# Patient Record
Sex: FEMALE | Race: OTHER | URBAN - METROPOLITAN AREA
[De-identification: names, ages, dates, MRNs, and addresses within clinical notes are randomized per-mention and may not be internally consistent; named-entity substitution may affect disease eponyms.]

---

## 2022-06-22 ENCOUNTER — INPATIENT (INPATIENT)
Facility: HOSPITAL | Age: 82
LOS: 6 days | Discharge: NURSING, REHAB OR LONG-TERM CARE | End: 2022-06-29
Attending: STUDENT IN AN ORGANIZED HEALTH CARE EDUCATION/TRAINING PROGRAM | Admitting: STUDENT IN AN ORGANIZED HEALTH CARE EDUCATION/TRAINING PROGRAM
Payer: MEDICARE

## 2022-06-22 ENCOUNTER — TRANSCRIPTION ENCOUNTER (OUTPATIENT)
Age: 82
End: 2022-06-22

## 2022-06-22 VITALS
DIASTOLIC BLOOD PRESSURE: 76 MMHG | WEIGHT: 158.95 LBS | SYSTOLIC BLOOD PRESSURE: 147 MMHG | OXYGEN SATURATION: 98 % | RESPIRATION RATE: 18 BRPM | HEART RATE: 122 BPM

## 2022-06-22 LAB
ALBUMIN SERPL ELPH-MCNC: 4.4 G/DL — SIGNIFICANT CHANGE UP (ref 3.5–5.2)
ALP SERPL-CCNC: 57 U/L — SIGNIFICANT CHANGE UP (ref 30–115)
ALT FLD-CCNC: 29 U/L — SIGNIFICANT CHANGE UP (ref 0–41)
ANION GAP SERPL CALC-SCNC: 12 MMOL/L — SIGNIFICANT CHANGE UP (ref 7–14)
APTT BLD: 28 SEC — SIGNIFICANT CHANGE UP (ref 27–39.2)
AST SERPL-CCNC: 28 U/L — SIGNIFICANT CHANGE UP (ref 0–41)
BASOPHILS # BLD AUTO: 0.03 K/UL — SIGNIFICANT CHANGE UP (ref 0–0.2)
BASOPHILS NFR BLD AUTO: 0.3 % — SIGNIFICANT CHANGE UP (ref 0–1)
BILIRUB SERPL-MCNC: 1.5 MG/DL — HIGH (ref 0.2–1.2)
BLD GP AB SCN SERPL QL: SIGNIFICANT CHANGE UP
BUN SERPL-MCNC: 12 MG/DL — SIGNIFICANT CHANGE UP (ref 10–20)
CALCIUM SERPL-MCNC: 9 MG/DL — SIGNIFICANT CHANGE UP (ref 8.5–10.1)
CHLORIDE SERPL-SCNC: 98 MMOL/L — SIGNIFICANT CHANGE UP (ref 98–110)
CO2 SERPL-SCNC: 25 MMOL/L — SIGNIFICANT CHANGE UP (ref 17–32)
CREAT SERPL-MCNC: 0.5 MG/DL — LOW (ref 0.7–1.5)
EGFR: 95 ML/MIN/1.73M2 — SIGNIFICANT CHANGE UP
EOSINOPHIL # BLD AUTO: 0.08 K/UL — SIGNIFICANT CHANGE UP (ref 0–0.7)
EOSINOPHIL NFR BLD AUTO: 0.7 % — SIGNIFICANT CHANGE UP (ref 0–8)
GLUCOSE SERPL-MCNC: 130 MG/DL — HIGH (ref 70–99)
HCT VFR BLD CALC: 36.9 % — LOW (ref 37–47)
HGB BLD-MCNC: 12.7 G/DL — SIGNIFICANT CHANGE UP (ref 12–16)
IMM GRANULOCYTES NFR BLD AUTO: 0.2 % — SIGNIFICANT CHANGE UP (ref 0.1–0.3)
INR BLD: 1.3 RATIO — SIGNIFICANT CHANGE UP (ref 0.65–1.3)
LYMPHOCYTES # BLD AUTO: 1.46 K/UL — SIGNIFICANT CHANGE UP (ref 1.2–3.4)
LYMPHOCYTES # BLD AUTO: 12.9 % — LOW (ref 20.5–51.1)
MCHC RBC-ENTMCNC: 30 PG — SIGNIFICANT CHANGE UP (ref 27–31)
MCHC RBC-ENTMCNC: 34.4 G/DL — SIGNIFICANT CHANGE UP (ref 32–37)
MCV RBC AUTO: 87 FL — SIGNIFICANT CHANGE UP (ref 81–99)
MONOCYTES # BLD AUTO: 1.27 K/UL — HIGH (ref 0.1–0.6)
MONOCYTES NFR BLD AUTO: 11.2 % — HIGH (ref 1.7–9.3)
NEUTROPHILS # BLD AUTO: 8.45 K/UL — HIGH (ref 1.4–6.5)
NEUTROPHILS NFR BLD AUTO: 74.7 % — SIGNIFICANT CHANGE UP (ref 42.2–75.2)
NRBC # BLD: 0 /100 WBCS — SIGNIFICANT CHANGE UP (ref 0–0)
PLATELET # BLD AUTO: 263 K/UL — SIGNIFICANT CHANGE UP (ref 130–400)
POTASSIUM SERPL-MCNC: 3.5 MMOL/L — SIGNIFICANT CHANGE UP (ref 3.5–5)
POTASSIUM SERPL-SCNC: 3.5 MMOL/L — SIGNIFICANT CHANGE UP (ref 3.5–5)
PROT SERPL-MCNC: 6.8 G/DL — SIGNIFICANT CHANGE UP (ref 6–8)
PROTHROM AB SERPL-ACNC: 14.9 SEC — HIGH (ref 9.95–12.87)
RBC # BLD: 4.24 M/UL — SIGNIFICANT CHANGE UP (ref 4.2–5.4)
RBC # FLD: 12.5 % — SIGNIFICANT CHANGE UP (ref 11.5–14.5)
SARS-COV-2 RNA SPEC QL NAA+PROBE: SIGNIFICANT CHANGE UP
SODIUM SERPL-SCNC: 135 MMOL/L — SIGNIFICANT CHANGE UP (ref 135–146)
TROPONIN T SERPL-MCNC: <0.01 NG/ML — SIGNIFICANT CHANGE UP
WBC # BLD: 11.31 K/UL — HIGH (ref 4.8–10.8)
WBC # FLD AUTO: 11.31 K/UL — HIGH (ref 4.8–10.8)

## 2022-06-22 PROCEDURE — 99223 1ST HOSP IP/OBS HIGH 75: CPT

## 2022-06-22 PROCEDURE — 99291 CRITICAL CARE FIRST HOUR: CPT

## 2022-06-22 PROCEDURE — 0042T: CPT

## 2022-06-22 PROCEDURE — 70496 CT ANGIOGRAPHY HEAD: CPT | Mod: 26,MA

## 2022-06-22 PROCEDURE — 99292 CRITICAL CARE ADDL 30 MIN: CPT

## 2022-06-22 PROCEDURE — 93010 ELECTROCARDIOGRAM REPORT: CPT

## 2022-06-22 PROCEDURE — 70450 CT HEAD/BRAIN W/O DYE: CPT | Mod: 26,59,76

## 2022-06-22 PROCEDURE — 70498 CT ANGIOGRAPHY NECK: CPT | Mod: 26,MA

## 2022-06-22 RX ORDER — NOREPINEPHRINE BITARTRATE/D5W 8 MG/250ML
0.05 PLASTIC BAG, INJECTION (ML) INTRAVENOUS
Qty: 8 | Refills: 0 | Status: DISCONTINUED | OUTPATIENT
Start: 2022-06-22 | End: 2022-06-24

## 2022-06-22 RX ORDER — SODIUM CHLORIDE 9 MG/ML
1000 INJECTION INTRAMUSCULAR; INTRAVENOUS; SUBCUTANEOUS ONCE
Refills: 0 | Status: DISCONTINUED | OUTPATIENT
Start: 2022-06-22 | End: 2022-06-24

## 2022-06-22 RX ORDER — SODIUM CHLORIDE 9 MG/ML
500 INJECTION INTRAMUSCULAR; INTRAVENOUS; SUBCUTANEOUS ONCE
Refills: 0 | Status: DISCONTINUED | OUTPATIENT
Start: 2022-06-22 | End: 2022-06-24

## 2022-06-22 RX ORDER — SODIUM CHLORIDE 9 MG/ML
1000 INJECTION INTRAMUSCULAR; INTRAVENOUS; SUBCUTANEOUS ONCE
Refills: 0 | Status: COMPLETED | OUTPATIENT
Start: 2022-06-22 | End: 2022-06-22

## 2022-06-22 RX ORDER — ONDANSETRON 8 MG/1
4 TABLET, FILM COATED ORAL ONCE
Refills: 0 | Status: DISCONTINUED | OUTPATIENT
Start: 2022-06-22 | End: 2022-06-29

## 2022-06-22 RX ORDER — ATORVASTATIN CALCIUM 80 MG/1
10 TABLET, FILM COATED ORAL AT BEDTIME
Refills: 0 | Status: DISCONTINUED | OUTPATIENT
Start: 2022-06-22 | End: 2022-06-22

## 2022-06-22 RX ORDER — SODIUM CHLORIDE 9 MG/ML
1000 INJECTION INTRAMUSCULAR; INTRAVENOUS; SUBCUTANEOUS
Refills: 0 | Status: DISCONTINUED | OUTPATIENT
Start: 2022-06-22 | End: 2022-06-25

## 2022-06-22 RX ORDER — ALTEPLASE 100 MG
58.4 KIT INTRAVENOUS ONCE
Refills: 0 | Status: DISCONTINUED | OUTPATIENT
Start: 2022-06-22 | End: 2022-06-23

## 2022-06-22 RX ORDER — ALTEPLASE 100 MG
6.5 KIT INTRAVENOUS ONCE
Refills: 0 | Status: DISCONTINUED | OUTPATIENT
Start: 2022-06-22 | End: 2022-06-23

## 2022-06-22 RX ORDER — CHLORHEXIDINE GLUCONATE 213 G/1000ML
1 SOLUTION TOPICAL
Refills: 0 | Status: DISCONTINUED | OUTPATIENT
Start: 2022-06-22 | End: 2022-06-29

## 2022-06-22 RX ORDER — SENNA PLUS 8.6 MG/1
2 TABLET ORAL AT BEDTIME
Refills: 0 | Status: DISCONTINUED | OUTPATIENT
Start: 2022-06-22 | End: 2022-06-29

## 2022-06-22 RX ORDER — ATORVASTATIN CALCIUM 80 MG/1
80 TABLET, FILM COATED ORAL AT BEDTIME
Refills: 0 | Status: DISCONTINUED | OUTPATIENT
Start: 2022-06-22 | End: 2022-06-23

## 2022-06-22 RX ORDER — POLYETHYLENE GLYCOL 3350 17 G/17G
17 POWDER, FOR SOLUTION ORAL EVERY 12 HOURS
Refills: 0 | Status: DISCONTINUED | OUTPATIENT
Start: 2022-06-22 | End: 2022-06-29

## 2022-06-22 RX ADMIN — SODIUM CHLORIDE 1000 MILLILITER(S): 9 INJECTION INTRAMUSCULAR; INTRAVENOUS; SUBCUTANEOUS at 22:50

## 2022-06-22 RX ADMIN — ALTEPLASE 390 MILLIGRAM(S): KIT at 16:30

## 2022-06-22 RX ADMIN — ALTEPLASE 58.4 MILLIGRAM(S): KIT at 16:31

## 2022-06-22 NOTE — ED PROVIDER NOTE - PHYSICAL EXAMINATION
Physical Exam    Vital Signs: I have reviewed the initial vital signs.  Constitutional: well-nourished, appears stated age, no acute distress  Eyes: Conjunctiva pink, Sclera clear, PERRLA, EOMI without pain.  Cardiovascular: S1 and S2, regular rate, regular rhythm, well-perfused extremities, radial pulses equal and 2+ b/l.   Respiratory: unlabored respiratory effort, clear to auscultation bilaterally no wheezing, rales and rhonchi. pt is speaking full sentences. no accessory muscle use.   Gastrointestinal: soft, non-tender, nondistended abdomen, no pulsatile mass, normal bowl sounds, no rebound, no guarding  Musculoskeletal: supple neck, no lower extremity edema, no calf tenderness  Integumentary: warm, dry, no rash  Neurologic: (+) right upper lip droop. awake, alert, cranial nerves II-XII grossly intact, extremities’ motor and sensory functions grossly intact. finger to nose intact. (+) right upper extremity pronator drift, and inability to hold right lower extremity up.   Psychiatric: appropriate mood, appropriate affect

## 2022-06-22 NOTE — BRIEF OPERATIVE NOTE - OPERATION/FINDINGS
Procedure: DSA + Mechanical Thrombectomy  Amount and type of contrast: Visipaque 320  Medications given during procedure: Verapamil IA 10 mg  Implants placed: None  Complications: None    Post-procedure exam:   NIHSS: 0  Extremity - RLE with dressing in place and c/d/i, DP and PT pulses palpable 2+.   Abd - NTND    Suggestions: -140   Disposition: NCC  Please follow post NI orders for neuro checks, distal pulses, vitals, and groin checks placed for total of 24 hours recovery period following procedure. Please keep reverse Trendelenburg x 24 hours and leave knee immobilizer x 6 hours. Keep IVF maintenance @75 until dysphagia screen / PO diet resumes.   SBP: 110-140    Please notify provider with any signs of bleeding or hematoma at R groin site, change in mental status, vitals outside parameters, or absent distal pulses.   Management per NCC.  [id number] Procedure: DSA + Mechanical Thrombectomy  Amount and type of contrast: Visipaque 320  Medications given during procedure: Verapamil IA 10 mg  Implants placed: None  Complications: None    Post-procedure exam:   Neuro - Left pupil 2mm, sluggish; right 1mm brisk (s/p eye surgery, unknown baseline), moving all extremities, + RUE drift, not to bed; RLE untested, but wiggles toes; purposeful movements (grabs arm, grasps hand, but does not follow commands), + aphasia, + severe dysarthria.  Abd: Nontender, nondistended, + bowel sounds in 4 quadrants.  Extremity - RLE with dressing in place and c/d/i, DP and PT pulses palpable 2+.     Suggestions: -140   Disposition: NCC  Please follow post NI orders for neuro checks, distal pulses, vitals, and groin checks placed for total of 24 hours recovery period following procedure. Please keep reverse Trendelenburg x 24 hours and leave knee immobilizer x 6 hours. Keep IVF maintenance @75 until dysphagia screen / PO diet resumes.   SBP: 110-140    Please notify provider with any signs of bleeding or hematoma at R groin site, change in mental status, vitals outside parameters, or absent distal pulses.   Management per NCC.  [id number]

## 2022-06-22 NOTE — ED PROVIDER NOTE - CRITICAL CARE ATTENDING CONTRIBUTION TO CARE
Attending Statement: This was a shared visit with the HUNTER. I reviewed and verified the documentation and independently performed the documented:     History, Exam and Medical Decision Making.    81yoF with h/o HLD, on no antiplt/coag agents, presents with CVA r/o. Per daughter at bedside, her brother saw and spoke with the pt at 130pm today and was nml, and on daughter arriving home shortly PTA she found her with slurred speech and R sided weakness. Has been reporting some stomach upset recently and decreased appetite. Pt reports some nausea and denies all other symptoms including HA, CP, SOB, vision changes. On exam, afebrile, hemodynamically stable, saturating well, NAD, well appearing, sitting comfortably in bed, no WOB, speaking full sentences, head NCAT, EOMI grossly, anicteric, MMM, no JVD, RRR, nml S1/S2, no m/r/g, lungs CTAB, no w/r/r, abd soft, NT, ND, nml BS, no rebound or guarding, AAO, CN's 3-12 intact other than mild R facial droop, R arm and leg drift, CALI spontaneously, no leg cyanosis or edema, skin warm, dry, no rashes or hives. Abdomen entirely benign with low suspicion for acute process. Character and appearance low suspicion for AAA, dissection, or mesenteric ischemia. UA ___________. Code stroke called on arrival and comanaged with neuro team. NIH 4 and consented pt and family for TPA. Attending Statement: This was a shared visit with the HUNTER. I reviewed and verified the documentation and independently performed the documented:     History, Exam and Medical Decision Making.    81yoF with h/o HLD, on no antiplt/coag agents, presents with CVA r/o. Per daughter at bedside, her brother saw and spoke with the pt at 130pm today and was nml, and on daughter arriving home shortly PTA she found her with slurred speech and R sided weakness. Has been reporting some stomach upset recently and decreased appetite. Pt reports some nausea and denies all other symptoms including HA, CP, SOB, vision changes. On exam, afebrile, hemodynamically stable, saturating well, NAD, well appearing, sitting comfortably in bed, no WOB, speaking full sentences, head NCAT, EOMI grossly, anicteric, MMM, no JVD, RRR, nml S1/S2, no m/r/g, lungs CTAB, no w/r/r, abd soft, NT, ND, nml BS, no rebound or guarding, AAO, CN's 3-12 intact other than mild R facial droop, R arm and leg drift, CALI spontaneously, no leg cyanosis or edema, skin warm, dry, no rashes or hives. Abdomen entirely benign with low suspicion for acute process. Character and appearance low suspicion for AAA, dissection, or mesenteric ischemia. Code stroke called on arrival and comanaged with neuro team. NIH 4 and consented pt and family for TPA. Not candidate for NI due to low NIH. Following TPA pt became less responsive, more R arm flaccidity. Protecting airway. Rpt CT head no bleed. Sent for neurointervention.

## 2022-06-22 NOTE — H&P ADULT - CRITICAL CARE ATTENDING COMMENT
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 Extubated successfully post operatively, mild SAH vs contrast left sylvian fissure.  Rest of assessment and systemic plan of care as outlined above.

## 2022-06-22 NOTE — ED PROVIDER NOTE - OBJECTIVE STATEMENT
80 y/o female with a PMH of HTN, current cigarette smoking, and glaucoma presents to the ED for evaluation of right upper extremity weakness and right sided facial droop that began around 3pm. as per pt daughter she last saw pt normal around 10:30AM, and pt son in law saw her normal around 12:30PM, but then around 3pm pt daughter noted the symptoms. pt denies use of blood thinners, recent head trauma, chest pain, sob, fever, neck pain, back pain, abdominal pain, n/v/d/c, urinary or bowel retention or incontinence, numbness, or tingling.

## 2022-06-22 NOTE — STROKE CODE NOTE - NSMDCONSULT QTN_Y FT
80 yo female with pmhx of glaucoma, hld presents with right sided weakness and slurred speech. Pt states that she isnt feeling right. Daughter saw mother normal at 10 am. When she saw her again, she had right sided weakness and slurred speech. Daughter contacted cleaning lady who stated that the last time she saw her normal was at 130 pm. Stroke code called in ED. CTH showed possible Hyperdensity in the left carotid terminus could reflect underlying thrombosis, atherosclerotic disease, or artifact from adjacent bony structures. CTA/CTP Delayed perfusion in the left frontotemporal regions along the MCA territory measuring 81 mL. No perfusion evidence of core infarct. 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 The gradual loss of vascular enhancement in the proximal left cervical ICA could reflect pseudoocclusion. Confirmation with DSA is recommended.    NIH 4 on arrival to ED.   Post CTH patient was improving to NIH 2. Pt was given TPA at 1630 bolus 6.5mg. Evaluated by Dr Campos and Neurocrit team. Determined not to be a candidate for intervention due to NIH 0 on exam at this time. Around 17:00/ 17:10 pt stopped speaking, noticed to have left dilated pupil, not moving right side and not speaking but still following commands on left side. Sensation intact in upper part of arm but not lower arm or lower extremity. /66. Repeat CTH unchanged. Tpa completed. 500cc fluids given.     Discussed with Dr Darden, Code NI actual called. Pt taken to IR for thrombectomy.     Suggestion:  admit to Neuro ICU  post thrombectomy care  check H&P for complete plan 82 yo female with pmhx of glaucoma, hld presents with right sided weakness and slurred speech. Pt states that she isnt feeling right. Daughter saw mother normal at 10 am. When she saw her again, she had right sided weakness and slurred speech. Daughter contacted cleaning lady who stated that the last time she saw her normal was at 130 pm. Stroke code called in ED. CTH showed possible Hyperdensity in the left carotid terminus could reflect underlying thrombosis, atherosclerotic disease, or artifact from adjacent bony structures. CTA/CTP Delayed perfusion in the left frontotemporal regions along the MCA territory measuring 81 mL. No perfusion evidence of core infarct. 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 The gradual loss of vascular enhancement in the proximal left cervical ICA could reflect pseudoocclusion. Confirmation with DSA is recommended.    NIH 4 on arrival to ED.   Post CTH patient was improving to NIH 2. Pt was given TPA at 1630 bolus 6.5mg. Evaluated by Dr Campos and Neurocrit team. Determined not to be a candidate for intervention due to NIH 0 on exam at this time. Around 17:00/ 17:10 pt stopped speaking, noticed to have left dilated pupil, not moving right side and not speaking but still following commands on left side. Sensation intact in upper part of arm but not lower arm or lower extremity. Repeat NIH 17. /66. Repeat CTH unchanged. Tpa completed. 500cc fluids given.     Discussed with Dr Darden, Code NI actual called. Pt taken to IR for thrombectomy.     Suggestion:  admit to Neuro ICU  post thrombectomy care  check H&P for complete plan

## 2022-06-22 NOTE — H&P ADULT - NSHPLABSRESULTS_GEN_ALL_CORE
ICU Vital Signs Last 24 Hrs  T(C): --  T(F): --  HR: 91 (22 Jun 2022 17:15) (91 - 122)  BP: 151/76 (22 Jun 2022 17:15) (129/66 - 160/73)  BP(mean): --  ABP: --  ABP(mean): --  RR: 18 (22 Jun 2022 17:15) (16 - 18)  SpO2: 99% (22 Jun 2022 17:15) (95% - 99%)      alteplase    Bolus 6.5 milliGRAM(s) IV Bolus Once  alteplase    IVPB 58.4 milliGRAM(s) IV Intermittent Once  ondansetron Injectable 4 milliGRAM(s) IV Push once  sodium chloride 0.9% Bolus 500 milliLiter(s) IV Bolus once  sodium chloride 0.9% Bolus 1000 milliLiter(s) IV Bolus once      LABS:  Na: 135 (06-22 @ 16:45)  K: 3.5 (06-22 @ 16:45)  Cl: 98 (06-22 @ 16:45)  CO2: 25 (06-22 @ 16:45)  BUN: 12 (06-22 @ 16:45)  Cr: 0.5 (06-22 @ 16:45)  Glu: 130(06-22 @ 16:45)    Hgb: 12.7 (06-22 @ 16:45)  Hct: 36.9 (06-22 @ 16:45)  WBC: 11.31 (06-22 @ 16:45)  Plt: 263 (06-22 @ 16:45)    INR: 1.30 06-22-22 @ 16:45  PTT: 28.0 06-22-22 @ 16:45    LIVER FUNCTIONS - ( 22 Jun 2022 16:45 )  Alb: 4.4 g/dL / Pro: 6.8 g/dL / ALK PHOS: 57 U/L / ALT: 29 U/L / AST: 28 U/L / GGT: x           Imaging:  EXAM:  CT ANGIO BRAIN (W)AW IC/CT ANGIO NECK (W)AW IC/CT PERFUSION W MAPS IC [6/22/2022]  IMPRESSION:  CT PERFUSION:  Delayed perfusion in the left frontotemporal regions along the MCA territory measuring 81 mL. No perfusion evidence of core infarct.  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 The gradual loss of vascular enhancement in the proximal left cervical ICA could reflect pseudoocclusion. Confirmation with DSA is recommended.  EXAM:  CT BRAIN STROKE PROTOCOL [6/22/2022]  FINDINGS:  There is hyperdensity in the left carotid terminus (2-9).  There is prominence of the sulci, sylvian fissures, and ventricles, reflecting mild diffuse parenchymal volume loss.  There is no evidence of acute territorial infarct or intracranial hemorrhage. There is no space-occupying lesion or midline shift.  There is no evidence of hydrocephalus. There are no extra-axial fluid collections.  The visualized intraorbital contents are normal. Small retention cysts versus mucosal polyp in the right sphenoid sinus. The mastoid air cells are aerated. The visualized soft tissues and osseous structures appear normal.  IMPRESSION:  Hyperdensity in the left carotid terminus could reflect underlying thrombosis, atherosclerotic disease, or artifact from adjacent bony structures.  No acute territorial infarct, intracranial hemorrhage, or midline shift.

## 2022-06-22 NOTE — CHART NOTE - NSCHARTNOTEFT_GEN_A_CORE
Patient was code NI actual with an NIHSS on exam of 21 for mute, sensory loss, RUE no movement, RLE no movement, partial facial paralysis, gaze paly, LOC, and orientation post tPA. Patient had acutely worsening symptoms, CT Head was taken on which no bleed was identified, and the patient was taken for an emergent DSA +/- MT. [id number]

## 2022-06-22 NOTE — ED ADULT NURSE REASSESSMENT NOTE - NS ED NURSE REASSESS COMMENT FT1
pt was receiving tpa and all of a sudden became altered and not responding. . pt was unable to move right side of her body. md and neuro notified. tpa temporarily stopped and began again after repeat ct. tpa finished. pt moved to ir.

## 2022-06-22 NOTE — H&P ADULT - NSHPPHYSICALEXAM_GEN_ALL_CORE
EXAMINATION:  General: No acute distress  HEENT: Anicteric sclerae  Cardiac: [id number]  Lungs: Clear  Abdomen: Soft, non-tender, +BS  Extremities: No c/c/e  Skin/Incision Site: Clean, dry and intact  Neurologic: Awake, alert, fully oriented, follows commands, no dysarthria or aphasia, PERRL, VFFtc, EOMI, face symmetric, tongue midline, slight LUE drift, does not hit bed, RUE and B/L LE, no drift, full strength, sensation intact, no neglect    NIHSS = 1 EXAMINATION:  General: No acute distress  HEENT: Anicteric sclerae  Cardiac: [id number]  Lungs: Clear  Abdomen: Soft, non-tender, +BS  Extremities: No c/c/e  Skin/Incision Site: Clean, dry and intact  Neurologic: Awake, alert, fully oriented, follows commands, no dysarthria or aphasia, PERRL, VFFtc, EOMI, face symmetric, tongue midline, slight RUE drift, does not hit bed, LUE and B/L LE, no drift, full strength, sensation intact, no neglect    NIHSS = 1

## 2022-06-22 NOTE — ED PROVIDER NOTE - NS ED ROS FT
CONST: No fever, chills or bodyaches  EYES: No pain, redness, drainage or visual changes.  ENT: No ear pain or discharge, nasal discharge or congestion. No sore throat  CARD: No chest pain, palpitations  RESP: No SOB, cough, hemoptysis. No hx of asthma or COPD  GI: No abdominal pain, N/V/D  : No urinary symptoms  MS: No joint pain, back pain or extremity pain/injury  SKIN: No rashes  NEURO: (+) right facial droop, and right upper extremity weakness. No headache, dizziness, paresthesias or LOC

## 2022-06-22 NOTE — H&P ADULT - HISTORY OF PRESENT ILLNESS
81-year-old female with PMHx HLD p/w dysarthria, mild right facial droop, and RUE/RLE weakness, LKW 1330, a/w upset stomach, nausea, and decreased PO intake, NIHSS = 4. Stroke code activated. CTH revealed hyperdensity in the left carotid terminus could reflect underlying thrombosis, atherosclerotic disease, or artifact from adjacent bony structures, with no acute territorial infarct, intracranial hemorrhage, or midline shift. 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 The gradual loss of vascular enhancement in the proximal left cervical ICA could reflect pseudoocclusion. CTP revealed delayed perfusion in the left frontotemporal regions along the MCA territory measuring 81 mL. No perfusion evidence of core infarct. Risks versus benefits of tPA discussed with daughter, tPA administered @ 1630. Code NI actual for Left ICA occlusion, cancelled 2/2 improving NIHSS. Patient examined by NCC team during tPA administration, NIHSS = 1 for notable LUE drift. Admit to NCCU for close neurological monitoring and optimization.     Interval events: At 1700, patient aphasic, dilated left pupil, dense right-sided hemiplegia, and decreased sensation right lower arm and RLE, however still able to follow commands on left side. Code NI reactivated. Patient taken for mechanical thrombectomy.    81-year-old female with PMHx HLD p/w dysarthria, mild right facial droop, and RUE/RLE weakness, LKW 1330, a/w upset stomach, nausea, and decreased PO intake, NIHSS = 4. Stroke code activated. CTH revealed hyperdensity in the left carotid terminus could reflect underlying thrombosis, atherosclerotic disease, or artifact from adjacent bony structures, with no acute territorial infarct, intracranial hemorrhage, or midline shift. 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 The gradual loss of vascular enhancement in the proximal left cervical ICA could reflect pseudoocclusion. CTP revealed delayed perfusion in the left frontotemporal regions along the MCA territory measuring 81 mL. No perfusion evidence of core infarct. Risks versus benefits of tPA discussed with daughter, tPA administered @ 1630. Code NI actual for Left ICA occlusion, cancelled 2/2 improving NIHSS. Patient examined by NCC team during tPA administration, NIHSS = 1 for notable LUE drift. Admit to NCCU for close neurological monitoring and optimization.     Interval events: At 1700, patient aphasic, dilated left pupil, dense right-sided hemiplegia, and decreased sensation right lower arm and RLE, however still able to follow commands on left side. Repeat CTH revealed no acute territorial infarct, intracranial hemorrhage, or midline shift, with retained contrast in the intracranial vessels. Code NI reactivated. Patient taken for mechanical thrombectomy.

## 2022-06-22 NOTE — ED ADULT TRIAGE NOTE - CHIEF COMPLAINT QUOTE
Pt here slurred speech and R sided weakness starting ~ 3pm noticed by pt and daughter. Stroke code activated prenotification. BGL

## 2022-06-22 NOTE — CHART NOTE - NSCHARTNOTEFT_GEN_A_CORE
ICU ADMISSION NOTE      Procedure:   Post op diagnosis:      _x___  Intubated  TV:__450____       Rate: _12_____      FiO2: _60_____    ____  Patent Airway    ____  Full return of protective reflexes    ____  Full recovery from anesthesia / back to baseline status    Vitals  HR: 81  BP: 118/65  RR: 12  O2 Sat: 100  Temp: 36    Mental Status:  ____ Awake   _____ Alert   _____ Drowsy   __x___ Sedated    Nausea/Vomiting:  __x__ NO  ______Yes,   See Post - Op Orders          Pain Scale (0-10):  _____    Treatment: ____ None    __x__ See Post - Op/PCA Orders    Post - Operative Fluids:   ____ Oral   __x__ See Post - Op Orders    Plan: Discharge when criteria met:   ____Home       _____Floor     __x___Critical Care   Other:_________________    Comments: Patient had smooth intraoperative event, no anesthesia complication.

## 2022-06-22 NOTE — ED ADULT TRIAGE NOTE - BEFAST BALANCE
No Detail Level: Detailed Quality 402: Tobacco Use And Help With Quitting Among Adolescents: Patient screened for tobacco and never smoked Quality 226: Preventive Care And Screening: Tobacco Use: Screening And Cessation Intervention: Patient screened for tobacco use and is an ex/non-smoker

## 2022-06-22 NOTE — ED PROVIDER NOTE - PROGRESS NOTE DETAILS
FF: pt is a candidate for tpa, as per neurology recommend ordering it and mixing it. FF: pt given tpa, pt now is not speaking, not moving the right upper or lower extremity. right sided hemiplegia. pt left pupil is 4mm and not responsive to light. neurology notified. tpa stopped. repeat fingerstick performed. ct performed. no bleeding. can resume tpa and give 500cc of normal saline. NI alert again. FF: pt given tpa, pt now is not speaking, not moving the right upper or lower extremity. right sided hemiplegia. pt left pupil is 4mm and not responsive to light. neurology notified. tpa stopped. repeat fingerstick performed. ct performed. no bleeding. can resume tpa and give 500cc of normal saline. NI alert again, pt going to interventional radiology. FF: pt given tpa, pt now is not speaking, not moving the right upper or lower extremity. right sided hemiplegia. pt left pupil is 4mm and not responsive to light. neurology notified. tpa stopped. repeat fingerstick performed. ct performed. no bleeding. can resume tpa and give 500cc of normal saline. NI alert again, pt going to interventional neuro.

## 2022-06-22 NOTE — H&P ADULT - ASSESSMENT
`    Risks (including death, and/or increased risk of systemic or intracranial hemorrhage) and benefits (increased odds of good neurologic outcome at 3 months) have been discussed with patient or patient’s representative.  Alternatives include conservative management without tPA were also discussed.    - Administer tPA 0.9 mg/kg, with 10% as an IV bolus over one minute and remainder as an IV infusion over one hour  - Monitor neurological exam closely.  If there is any neurologic deterioration, notify Neurology consult STAT, discontinue tPA infusion, obtain STAT CT head w/o contrast, and STAT set of labs including coags, fibrinogen, type and crossmatch  - Check BP q15min x2 hrs, q30min x4 hrs, and then q1hr x48 hrs   - Monitor for any signs/symptoms of systemic bleeding  - No anticoagulation or antiplatelet agents x24 hrs  - Avoid central lines, arterial lines, NG tubes, and cannon catheters for 24 hours post-tPA, unless medically necessary  - Plan for repeat CT head 24 hrs post-tPA administration    Blood Pressure Management Post-tPA: GOAL SBP <180/105       ASSESSMENT: 81-year-old female p/w dysarthria, mild right facial droop, and RUE/RLE weakness. 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 s/p tpa @ 1630, 6/22. No NI intervention 2/2 NIHSS improving from 4 --> 1. After tpa, patient became aphasic, dilated left pupil, dense right-sided hemiplegia, and decreased sensation right lower arm and RLE, however still able to follow commands on left side. Code NI reactivated. Patient taken for mechanical thrombectomy.     PLAN:   NEURO:  - V/S, Neurochecks w/ NIHSS, groin checks, access site assessment, abdominal and pelvic tenderness, distal pulses and neurovascular assessment:         - [id number] x 2 hours         - [id number] x 6 hours         - q1hour x 16 hours  - Assess for s/s limb ischemia, hematoma, or bleeding, if present, please call NCC/Neurology  - Keep limb straight for 6 hours via immobilizer  - [place] Trendelenburg x 6 hours   - post tPA management:         - Monitor for any signs/symptoms of systemic bleeding         - No anticoagulation or antiplatelet agents x24 hrs         - Avoid central lines, arterial lines, NG tubes, and cannon catheters for 24 hours post-tPA, unless medically necessary         - Plan for repeat CT head 24 hrs post-tPA administration  - MRI brain when able   - Seizure precautions  - ICU delirium precautions  Activity: [X] Increase as tolerated [] Bedrest [X] PT [X] OT [] PMNR    PULM:  - HOB > 35 degrees  - Aspiration precautions  - Keep SaO2 > 95%    CV:  - Keep SBP < 160  - Telemetry monitoring  - 12-lead ECG  - TTE/2D echo  - Lipid profile  - h/o HLD, restart home statin    RENAL:  - Strict I/Os, daily weights  - Keep euvolemic  - Keep normonatremic   - Keep Magnesium level > 2  - Monitor lytes, replete as needed  - NS @ 75cc/hr    GI:  Diet: Dysphagia screen and then advance diet as tolerated  GI prophylaxis [X] not indicated [] PPI [] other:  Bowel regimen [X] Miralax [X] senna [] other:    ENDO:   - Goal euglycemia (-180)  - HgA1c, TSH    HEME/ONC:  VTE prophylaxis: [X] SCDs [] chemoprophylaxis [] hold chemoprophylaxis due to: [] high risk of DVT/PE on admission due to:  - Hold A/C and A/P for now  - VA Duplex B/L LE    ID:  - Keep normothermic, avoid fevers    MISC:  PT/OT/SLP    CODE STATUS:  [X] Full Code [] DNR [] DNI [] Palliative/Comfort Care    DISPOSITION:  [X] NCCU [] Stroke Unit [] Floor [] EMU [] RCU [] PCU

## 2022-06-22 NOTE — BRIEF OPERATIVE NOTE - COMMENTS
Left proximal ICA/ M1 occlusion with IAN 0-2C achieved in 2 passes. 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   Pre procedure the patient was intubated due to patient vomiting/gagging and concerns about aspiration and arrived to MT suite with NIHSS 21. Post procedure the patient was taken for a CT head which showed SAH (8:16PM) which showed acute SAH in the silvian fissure and left frontal/temporal/parietal sulci. Patient was extubated successfully. Left proximal ICA/ M1 occlusion with TICI  0-2C achieved in 2 passes. 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   Pre procedure the patient was intubated due to patient vomiting/gagging and concerns about aspiration and arrived to MT suite with NIHSS 21. Post procedure the patient was taken for a CT head which showed SAH (8:16PM) which showed acute SAH in the silvian fissure and left frontal/temporal/parietal sulci. Patient was extubated successfully.   POST EXTUBATION, SHE OPENED HER EYES ON VOICE STIMULATION, FOLLOWING OBJECTS WITH HER EYES, MUMBLING, MOVING LT ARM AND LEG SPONTANEOUSLY, MOVING RIGHT ARM ANTIGRAVITY SPONTAEOUSLY, AND FLEXING RT ANKLE (HAS KNEE IMOBILIZER ON THE LT). SHE WAS NOT FOLLOWING COMMANDS, AND DID NOT HAVE NAUSEA OR VOMITING POST EXTUBATION.

## 2022-06-22 NOTE — ED PROVIDER NOTE - CLINICAL SUMMARY MEDICAL DECISION MAKING FREE TEXT BOX
Attending Statement: This was a shared visit with the HUNTER. I reviewed and verified the documentation and independently performed the documented:     History, Exam and Medical Decision Making.    81yoF with h/o HLD, on no antiplt/coag agents, presents with CVA r/o. Per daughter at bedside, her brother saw and spoke with the pt at 130pm today and was nml, and on daughter arriving home shortly PTA she found her with slurred speech and R sided weakness. Has been reporting some stomach upset recently and decreased appetite. Pt reports some nausea and denies all other symptoms including HA, CP, SOB, vision changes. On exam, afebrile, hemodynamically stable, saturating well, NAD, well appearing, sitting comfortably in bed, no WOB, speaking full sentences, head NCAT, EOMI grossly, anicteric, MMM, no JVD, RRR, nml S1/S2, no m/r/g, lungs CTAB, no w/r/r, abd soft, NT, ND, nml BS, no rebound or guarding, AAO, CN's 3-12 intact other than mild R facial droop, R arm and leg drift, CALI spontaneously, no leg cyanosis or edema, skin warm, dry, no rashes or hives. Abdomen entirely benign with low suspicion for acute process. Character and appearance low suspicion for AAA, dissection, or mesenteric ischemia. Code stroke called on arrival and comanaged with neuro team. NIH 4 and consented pt and family for TPA. Not candidate for NI due to low NIH. Following TPA pt became less responsive, more R arm flaccidity. Protecting airway. Rpt CT head no bleed. Sent for neurointervention.

## 2022-06-23 LAB
A1C WITH ESTIMATED AVERAGE GLUCOSE RESULT: 5 % — SIGNIFICANT CHANGE UP (ref 4–5.6)
ALBUMIN SERPL ELPH-MCNC: 3.7 G/DL — SIGNIFICANT CHANGE UP (ref 3.5–5.2)
ALBUMIN SERPL ELPH-MCNC: 3.8 G/DL — SIGNIFICANT CHANGE UP (ref 3.5–5.2)
ALP SERPL-CCNC: 47 U/L — SIGNIFICANT CHANGE UP (ref 30–115)
ALP SERPL-CCNC: 49 U/L — SIGNIFICANT CHANGE UP (ref 30–115)
ALT FLD-CCNC: 21 U/L — SIGNIFICANT CHANGE UP (ref 0–41)
ALT FLD-CCNC: 25 U/L — SIGNIFICANT CHANGE UP (ref 0–41)
ANION GAP SERPL CALC-SCNC: 10 MMOL/L — SIGNIFICANT CHANGE UP (ref 7–14)
ANION GAP SERPL CALC-SCNC: 12 MMOL/L — SIGNIFICANT CHANGE UP (ref 7–14)
AST SERPL-CCNC: 20 U/L — SIGNIFICANT CHANGE UP (ref 0–41)
AST SERPL-CCNC: 29 U/L — SIGNIFICANT CHANGE UP (ref 0–41)
BASOPHILS # BLD AUTO: 0.02 K/UL — SIGNIFICANT CHANGE UP (ref 0–0.2)
BASOPHILS NFR BLD AUTO: 0.2 % — SIGNIFICANT CHANGE UP (ref 0–1)
BILIRUB SERPL-MCNC: 0.9 MG/DL — SIGNIFICANT CHANGE UP (ref 0.2–1.2)
BILIRUB SERPL-MCNC: 1 MG/DL — SIGNIFICANT CHANGE UP (ref 0.2–1.2)
BUN SERPL-MCNC: 11 MG/DL — SIGNIFICANT CHANGE UP (ref 10–20)
BUN SERPL-MCNC: 9 MG/DL — LOW (ref 10–20)
CALCIUM SERPL-MCNC: 8 MG/DL — LOW (ref 8.5–10.1)
CALCIUM SERPL-MCNC: 8.1 MG/DL — LOW (ref 8.5–10.1)
CHLORIDE SERPL-SCNC: 104 MMOL/L — SIGNIFICANT CHANGE UP (ref 98–110)
CHLORIDE SERPL-SCNC: 107 MMOL/L — SIGNIFICANT CHANGE UP (ref 98–110)
CHOLEST SERPL-MCNC: 128 MG/DL — SIGNIFICANT CHANGE UP
CO2 SERPL-SCNC: 23 MMOL/L — SIGNIFICANT CHANGE UP (ref 17–32)
CO2 SERPL-SCNC: 24 MMOL/L — SIGNIFICANT CHANGE UP (ref 17–32)
CREAT SERPL-MCNC: 0.5 MG/DL — LOW (ref 0.7–1.5)
CREAT SERPL-MCNC: <0.5 MG/DL — LOW (ref 0.7–1.5)
EGFR: 101 ML/MIN/1.73M2 — SIGNIFICANT CHANGE UP
EGFR: 95 ML/MIN/1.73M2 — SIGNIFICANT CHANGE UP
EOSINOPHIL # BLD AUTO: 0.03 K/UL — SIGNIFICANT CHANGE UP (ref 0–0.7)
EOSINOPHIL NFR BLD AUTO: 0.3 % — SIGNIFICANT CHANGE UP (ref 0–8)
ESTIMATED AVERAGE GLUCOSE: 97 MG/DL — SIGNIFICANT CHANGE UP (ref 68–114)
GLUCOSE SERPL-MCNC: 104 MG/DL — HIGH (ref 70–99)
GLUCOSE SERPL-MCNC: 131 MG/DL — HIGH (ref 70–99)
HCT VFR BLD CALC: 32.9 % — LOW (ref 37–47)
HCT VFR BLD CALC: 33.4 % — LOW (ref 37–47)
HDLC SERPL-MCNC: 41 MG/DL — LOW
HGB BLD-MCNC: 11.1 G/DL — LOW (ref 12–16)
HGB BLD-MCNC: 11.5 G/DL — LOW (ref 12–16)
IMM GRANULOCYTES NFR BLD AUTO: 0.2 % — SIGNIFICANT CHANGE UP (ref 0.1–0.3)
LIPID PNL WITH DIRECT LDL SERPL: 75 MG/DL — SIGNIFICANT CHANGE UP
LYMPHOCYTES # BLD AUTO: 0.89 K/UL — LOW (ref 1.2–3.4)
LYMPHOCYTES # BLD AUTO: 9.3 % — LOW (ref 20.5–51.1)
MAGNESIUM SERPL-MCNC: 1.9 MG/DL — SIGNIFICANT CHANGE UP (ref 1.8–2.4)
MAGNESIUM SERPL-MCNC: 2 MG/DL — SIGNIFICANT CHANGE UP (ref 1.8–2.4)
MCHC RBC-ENTMCNC: 30.2 PG — SIGNIFICANT CHANGE UP (ref 27–31)
MCHC RBC-ENTMCNC: 30.3 PG — SIGNIFICANT CHANGE UP (ref 27–31)
MCHC RBC-ENTMCNC: 33.7 G/DL — SIGNIFICANT CHANGE UP (ref 32–37)
MCHC RBC-ENTMCNC: 34.4 G/DL — SIGNIFICANT CHANGE UP (ref 32–37)
MCV RBC AUTO: 88.1 FL — SIGNIFICANT CHANGE UP (ref 81–99)
MCV RBC AUTO: 89.4 FL — SIGNIFICANT CHANGE UP (ref 81–99)
MONOCYTES # BLD AUTO: 0.97 K/UL — HIGH (ref 0.1–0.6)
MONOCYTES NFR BLD AUTO: 10.1 % — HIGH (ref 1.7–9.3)
MRSA PCR RESULT.: NEGATIVE — SIGNIFICANT CHANGE UP
NEUTROPHILS # BLD AUTO: 7.69 K/UL — HIGH (ref 1.4–6.5)
NEUTROPHILS NFR BLD AUTO: 79.9 % — HIGH (ref 42.2–75.2)
NON HDL CHOLESTEROL: 87 MG/DL — SIGNIFICANT CHANGE UP
NRBC # BLD: 0 /100 WBCS — SIGNIFICANT CHANGE UP (ref 0–0)
NRBC # BLD: 0 /100 WBCS — SIGNIFICANT CHANGE UP (ref 0–0)
PHOSPHATE SERPL-MCNC: 2.9 MG/DL — SIGNIFICANT CHANGE UP (ref 2.1–4.9)
PHOSPHATE SERPL-MCNC: 3.8 MG/DL — SIGNIFICANT CHANGE UP (ref 2.1–4.9)
PLATELET # BLD AUTO: 215 K/UL — SIGNIFICANT CHANGE UP (ref 130–400)
PLATELET # BLD AUTO: 220 K/UL — SIGNIFICANT CHANGE UP (ref 130–400)
POTASSIUM SERPL-MCNC: 3.7 MMOL/L — SIGNIFICANT CHANGE UP (ref 3.5–5)
POTASSIUM SERPL-MCNC: 4.2 MMOL/L — SIGNIFICANT CHANGE UP (ref 3.5–5)
POTASSIUM SERPL-SCNC: 3.7 MMOL/L — SIGNIFICANT CHANGE UP (ref 3.5–5)
POTASSIUM SERPL-SCNC: 4.2 MMOL/L — SIGNIFICANT CHANGE UP (ref 3.5–5)
PROT SERPL-MCNC: 5.3 G/DL — LOW (ref 6–8)
PROT SERPL-MCNC: 6.1 G/DL — SIGNIFICANT CHANGE UP (ref 6–8)
RBC # BLD: 3.68 M/UL — LOW (ref 4.2–5.4)
RBC # BLD: 3.79 M/UL — LOW (ref 4.2–5.4)
RBC # FLD: 12.5 % — SIGNIFICANT CHANGE UP (ref 11.5–14.5)
RBC # FLD: 12.5 % — SIGNIFICANT CHANGE UP (ref 11.5–14.5)
SODIUM SERPL-SCNC: 139 MMOL/L — SIGNIFICANT CHANGE UP (ref 135–146)
SODIUM SERPL-SCNC: 141 MMOL/L — SIGNIFICANT CHANGE UP (ref 135–146)
TRIGL SERPL-MCNC: 60 MG/DL — SIGNIFICANT CHANGE UP
TSH SERPL-MCNC: 0.37 UIU/ML — SIGNIFICANT CHANGE UP (ref 0.27–4.2)
WBC # BLD: 12.06 K/UL — HIGH (ref 4.8–10.8)
WBC # BLD: 9.62 K/UL — SIGNIFICANT CHANGE UP (ref 4.8–10.8)
WBC # FLD AUTO: 12.06 K/UL — HIGH (ref 4.8–10.8)
WBC # FLD AUTO: 9.62 K/UL — SIGNIFICANT CHANGE UP (ref 4.8–10.8)

## 2022-06-23 PROCEDURE — 93306 TTE W/DOPPLER COMPLETE: CPT | Mod: 26

## 2022-06-23 PROCEDURE — 93970 EXTREMITY STUDY: CPT | Mod: 26

## 2022-06-23 PROCEDURE — 70450 CT HEAD/BRAIN W/O DYE: CPT | Mod: 26

## 2022-06-23 PROCEDURE — 70450 CT HEAD/BRAIN W/O DYE: CPT | Mod: 26,77

## 2022-06-23 PROCEDURE — 99291 CRITICAL CARE FIRST HOUR: CPT

## 2022-06-23 RX ORDER — POTASSIUM CHLORIDE 20 MEQ
20 PACKET (EA) ORAL ONCE
Refills: 0 | Status: COMPLETED | OUTPATIENT
Start: 2022-06-23 | End: 2022-06-23

## 2022-06-23 RX ORDER — ATORVASTATIN CALCIUM 80 MG/1
10 TABLET, FILM COATED ORAL AT BEDTIME
Refills: 0 | Status: DISCONTINUED | OUTPATIENT
Start: 2022-06-23 | End: 2022-06-27

## 2022-06-23 RX ORDER — LEVETIRACETAM 250 MG/1
500 TABLET, FILM COATED ORAL EVERY 12 HOURS
Refills: 0 | Status: DISCONTINUED | OUTPATIENT
Start: 2022-06-23 | End: 2022-06-29

## 2022-06-23 RX ORDER — MAGNESIUM SULFATE 500 MG/ML
2 VIAL (ML) INJECTION ONCE
Refills: 0 | Status: COMPLETED | OUTPATIENT
Start: 2022-06-23 | End: 2022-06-23

## 2022-06-23 RX ADMIN — SENNA PLUS 2 TABLET(S): 8.6 TABLET ORAL at 21:52

## 2022-06-23 RX ADMIN — SODIUM CHLORIDE 75 MILLILITER(S): 9 INJECTION INTRAMUSCULAR; INTRAVENOUS; SUBCUTANEOUS at 11:53

## 2022-06-23 RX ADMIN — LEVETIRACETAM 400 MILLIGRAM(S): 250 TABLET, FILM COATED ORAL at 18:09

## 2022-06-23 RX ADMIN — ATORVASTATIN CALCIUM 10 MILLIGRAM(S): 80 TABLET, FILM COATED ORAL at 21:52

## 2022-06-23 RX ADMIN — Medication 25 GRAM(S): at 08:09

## 2022-06-23 RX ADMIN — POLYETHYLENE GLYCOL 3350 17 GRAM(S): 17 POWDER, FOR SOLUTION ORAL at 18:10

## 2022-06-23 RX ADMIN — CHLORHEXIDINE GLUCONATE 1 APPLICATION(S): 213 SOLUTION TOPICAL at 05:47

## 2022-06-23 RX ADMIN — Medication 50 MILLIEQUIVALENT(S): at 08:05

## 2022-06-23 RX ADMIN — LEVETIRACETAM 400 MILLIGRAM(S): 250 TABLET, FILM COATED ORAL at 11:51

## 2022-06-23 NOTE — OCCUPATIONAL THERAPY INITIAL EVALUATION ADULT - LIVES WITH, PROFILE
pvt home, +GINA, +stairs inside, provided by dtr 2* to pt with decreased arousal and aphasia/children/spouse

## 2022-06-23 NOTE — PROVIDER CONTACT NOTE (OTHER) - DATE AND TIME:
22-Jun-2022 22:00
23-Jun-2022 05:00
22-Jun-2022 20:45
23-Jun-2022 00:30
23-Jun-2022 02:45
22-Jun-2022 21:00
23-Jun-2022 06:00

## 2022-06-23 NOTE — OCCUPATIONAL THERAPY INITIAL EVALUATION ADULT - ADL RETRAINING, OT EVAL
Patient will perform upper body dressing with minimal assistance by discharge.  Patient will perform lower body dressing with minimal assistance with use of appropriate adaptive equipment as needed by discharge.

## 2022-06-23 NOTE — PATIENT PROFILE ADULT - FALL HARM RISK - HARM RISK INTERVENTIONS

## 2022-06-23 NOTE — OCCUPATIONAL THERAPY INITIAL EVALUATION ADULT - RANGE OF MOTION EXAMINATION, UPPER EXTREMITY
RUE noted with ~1/2 gross grasp, ~1/2 elbow, ~1/4 shoulder flexion/Left UE Active ROM was WNL (within normal limits)

## 2022-06-23 NOTE — PROGRESS NOTE ADULT - SUBJECTIVE AND OBJECTIVE BOX
SUMMARY: 81-year-old female with PMHx HLD p/w dysarthria, mild right facial droop, and RUE/RLE weakness, LKW 1330, a/w upset stomach, nausea, and decreased PO intake, NIHSS = 4. Stroke code activated. CTH revealed hyperdensity in the left carotid terminus could reflect underlying thrombosis, atherosclerotic disease, or artifact from adjacent bony structures, with no acute territorial infarct, intracranial hemorrhage, or midline shift. 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 The gradual loss of vascular enhancement in the proximal left cervical ICA could reflect pseudoocclusion. CTP revealed delayed perfusion in the left frontotemporal regions along the MCA territory measuring 81 mL. No perfusion evidence of core infarct. Risks versus benefits of tPA discussed with daughter, tPA administered @ 1630. Code NI actual for Left ICA occlusion, cancelled 2/2 improving NIHSS. Patient examined by NCC team during tPA administration, NIHSS = 1 for notable LUE drift. Admit to NCCU for close neurological monitoring and optimization.     Interval events: At 1700, patient aphasic, dilated left pupil, dense right-sided hemiplegia, and decreased sensation right lower arm and RLE, however still able to follow commands on left side. Repeat CTH revealed no acute territorial infarct, intracranial hemorrhage, or midline shift, with retained contrast in the intracranial vessels. Code NI reactivated. Patient taken for mechanical thrombectomy.     OVERNIGHT EVENTS: CT Head at 00:00- stable. CT head this am pending     ADMISSION SCORES:   NIHSS: 4 -> 21 -> currently 9 post NIH    SEDATION SCORES:  RASS: CAM-ICU:     REVIEW OF SYSTEMS:    VITALS: [] Reviewed    IMAGING/DATA: [] Reviewed    IVF FLUIDS/MEDICATIONS: [] Reviewed    ALLERGIES: No Known Allergies    DEVICES:   [] Restraints [] PIVs [] ET tube [] central line [] PICC [] arterial line [] cannon [] NGT/OGT [] EVD [] LD [] CESAR/HMV [] LiCOX [] ICP monitor [] Trach [] PEG [] Chest Tube [] other:    EXAMINATION:  General: No acute distress  HEENT: Anicteric sclerae  Cardiac: [id number]  Lungs: Clear  Abdomen: Soft, non-tender, +BS  Extremities: No c/c/e  Skin/Incision Site: Clean, dry and intact  Neurologic:     ICU Vital Signs Last 24 Hrs  T(C): 36.7 (23 Jun 2022 04:00), Max: 36.8 (22 Jun 2022 20:30)  T(F): 98 (23 Jun 2022 04:00), Max: 98.2 (22 Jun 2022 20:30)  HR: 80 (23 Jun 2022 04:45) (58 - 122)  BP: 113/54 (23 Jun 2022 04:45) (82/49 - 160/73)  BP(mean): 88 (23 Jun 2022 04:45) (55 - 104)  ABP: --  ABP(mean): --  RR: 19 (23 Jun 2022 04:45) (15 - 27)  SpO2: 97% (23 Jun 2022 04:45) (95% - 100%)      06-22-22 @ 07:01  -  06-23-22 @ 05:34  --------------------------------------------------------  IN: 1950 mL / OUT: 0 mL / NET: 1950 mL        Mode: AC/ CMV (Assist Control/ Continuous Mandatory Ventilation), RR (machine): 14, TV (machine): 400, FiO2: 60, PEEP: 5, ITime: 1, MAP: 12, PIP: 36    alteplase    Bolus 6.5 milliGRAM(s) IV Bolus Once  alteplase    IVPB 58.4 milliGRAM(s) IV Intermittent Once  atorvastatin 80 milliGRAM(s) Oral at bedtime  chlorhexidine 4% Liquid 1 Application(s) Topical <User Schedule>  norepinephrine Infusion 0.05 MICROgram(s)/kG/Min (6.76 mL/Hr) IV Continuous <Continuous>  ondansetron Injectable 4 milliGRAM(s) IV Push once  polyethylene glycol 3350 17 Gram(s) Oral every 12 hours  senna 2 Tablet(s) Oral at bedtime  sodium chloride 0.9% Bolus 1000 milliLiter(s) IV Bolus once  sodium chloride 0.9% Bolus 500 milliLiter(s) IV Bolus once  sodium chloride 0.9%. 1000 milliLiter(s) (75 mL/Hr) IV Continuous <Continuous>      LABS:  Na: 139 (06-22 @ 23:33), 135 (06-22 @ 16:45)  K: 4.2 (06-22 @ 23:33), 3.5 (06-22 @ 16:45)  Cl: 104 (06-22 @ 23:33), 98 (06-22 @ 16:45)  CO2: 23 (06-22 @ 23:33), 25 (06-22 @ 16:45)  BUN: 11 (06-22 @ 23:33), 12 (06-22 @ 16:45)  Cr: 0.5 (06-22 @ 23:33), 0.5 (06-22 @ 16:45)  Glu: 131(06-22 @ 23:33), 130(06-22 @ 16:45)    Hgb: 11.5 (06-22 @ 23:33), 12.7 (06-22 @ 16:45)  Hct: 33.4 (06-22 @ 23:33), 36.9 (06-22 @ 16:45)  WBC: 12.06 (06-22 @ 23:33), 11.31 (06-22 @ 16:45)  Plt: 220 (06-22 @ 23:33), 263 (06-22 @ 16:45)    INR: 1.30 06-22-22 @ 16:45  PTT: 28.0 06-22-22 @ 16:45      LIVER FUNCTIONS - ( 22 Jun 2022 23:33 )  Alb: 3.8 g/dL / Pro: 6.1 g/dL / ALK PHOS: 49 U/L / ALT: 25 U/L / AST: 29 U/L / GGT: x                SUMMARY: 81-year-old female with PMHx HLD p/w dysarthria, mild right facial droop, and RUE/RLE weakness, LKW 1330, a/w upset stomach, nausea, and decreased PO intake, NIHSS = 4. Stroke code activated. CTH revealed hyperdensity in the left carotid terminus could reflect underlying thrombosis, atherosclerotic disease, or artifact from adjacent bony structures, with no acute territorial infarct, intracranial hemorrhage, or midline shift. 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 The gradual loss of vascular enhancement in the proximal left cervical ICA could reflect pseudoocclusion. CTP revealed delayed perfusion in the left frontotemporal regions along the MCA territory measuring 81 mL. No perfusion evidence of core infarct. Risks versus benefits of tPA discussed with daughter, tPA administered @ 1630. Code NI actual for Left ICA occlusion, cancelled 2/2 improving NIHSS. Patient examined by NCC team during tPA administration, NIHSS = 1 for notable LUE drift. Admit to NCCU for close neurological monitoring and optimization.     Interval events: At 1700, patient aphasic, dilated left pupil, dense right-sided hemiplegia, and decreased sensation right lower arm and RLE, however still able to follow commands on left side. Repeat CTH revealed no acute territorial infarct, intracranial hemorrhage, or midline shift, with retained contrast in the intracranial vessels. Code NI reactivated. Patient taken for mechanical thrombectomy.     OVERNIGHT EVENTS: CT Head at 00:00- stable. CT head this am pending     ADMISSION SCORES:   NIHSS: 4 -> 21 -> currently 9 post NIH    SEDATION SCORES:  RASS: CAM-ICU:     REVIEW OF SYSTEMS:    VITALS: [] Reviewed    IMAGING/DATA: [] Reviewed    IVF FLUIDS/MEDICATIONS: [] Reviewed    ALLERGIES: No Known Allergies    DEVICES:   [] Restraints [] PIVs [] ET tube [] central line [] PICC [] arterial line [] cannon [] NGT/OGT [] EVD [] LD [] CESAR/HMV [] LiCOX [] ICP monitor [] Trach [] PEG [] Chest Tube [] other:    EXAMINATION:  General: No acute distress  HEENT: Anicteric sclerae  Cardiac: [id number]  Lungs: Clear  Abdomen: Soft, non-tender, +BS  Extremities: No c/c/e  Skin/Incision Site: Clean, dry and intact  Neurologic: awake, and alert. pt able to intermittently say "yes" and " ok." Follows commands. able to track bilaterally. left pupils 4 mm briskly reactive, right pupil 2 mm briskly reactive. RUE AG, drift, does not hit bed. RLE AG, drift does not hit bed.     ICU Vital Signs Last 24 Hrs  T(C): 36.7 (23 Jun 2022 04:00), Max: 36.8 (22 Jun 2022 20:30)  T(F): 98 (23 Jun 2022 04:00), Max: 98.2 (22 Jun 2022 20:30)  HR: 80 (23 Jun 2022 04:45) (58 - 122)  BP: 113/54 (23 Jun 2022 04:45) (82/49 - 160/73)  BP(mean): 88 (23 Jun 2022 04:45) (55 - 104)  ABP: --  ABP(mean): --  RR: 19 (23 Jun 2022 04:45) (15 - 27)  SpO2: 97% (23 Jun 2022 04:45) (95% - 100%)      06-22-22 @ 07:01  -  06-23-22 @ 05:34  --------------------------------------------------------  IN: 1950 mL / OUT: 0 mL / NET: 1950 mL        Mode: AC/ CMV (Assist Control/ Continuous Mandatory Ventilation), RR (machine): 14, TV (machine): 400, FiO2: 60, PEEP: 5, ITime: 1, MAP: 12, PIP: 36    alteplase    Bolus 6.5 milliGRAM(s) IV Bolus Once  alteplase    IVPB 58.4 milliGRAM(s) IV Intermittent Once  atorvastatin 80 milliGRAM(s) Oral at bedtime  chlorhexidine 4% Liquid 1 Application(s) Topical <User Schedule>  norepinephrine Infusion 0.05 MICROgram(s)/kG/Min (6.76 mL/Hr) IV Continuous <Continuous>  ondansetron Injectable 4 milliGRAM(s) IV Push once  polyethylene glycol 3350 17 Gram(s) Oral every 12 hours  senna 2 Tablet(s) Oral at bedtime  sodium chloride 0.9% Bolus 1000 milliLiter(s) IV Bolus once  sodium chloride 0.9% Bolus 500 milliLiter(s) IV Bolus once  sodium chloride 0.9%. 1000 milliLiter(s) (75 mL/Hr) IV Continuous <Continuous>      LABS:  Na: 139 (06-22 @ 23:33), 135 (06-22 @ 16:45)  K: 4.2 (06-22 @ 23:33), 3.5 (06-22 @ 16:45)  Cl: 104 (06-22 @ 23:33), 98 (06-22 @ 16:45)  CO2: 23 (06-22 @ 23:33), 25 (06-22 @ 16:45)  BUN: 11 (06-22 @ 23:33), 12 (06-22 @ 16:45)  Cr: 0.5 (06-22 @ 23:33), 0.5 (06-22 @ 16:45)  Glu: 131(06-22 @ 23:33), 130(06-22 @ 16:45)    Hgb: 11.5 (06-22 @ 23:33), 12.7 (06-22 @ 16:45)  Hct: 33.4 (06-22 @ 23:33), 36.9 (06-22 @ 16:45)  WBC: 12.06 (06-22 @ 23:33), 11.31 (06-22 @ 16:45)  Plt: 220 (06-22 @ 23:33), 263 (06-22 @ 16:45)    INR: 1.30 06-22-22 @ 16:45  PTT: 28.0 06-22-22 @ 16:45      LIVER FUNCTIONS - ( 22 Jun 2022 23:33 )  Alb: 3.8 g/dL / Pro: 6.1 g/dL / ALK PHOS: 49 U/L / ALT: 25 U/L / AST: 29 U/L / GGT: x                SUMMARY: 81-year-old female with PMHx HLD p/w dysarthria, mild right facial droop, and RUE/RLE weakness, LKW 1330, a/w upset stomach, nausea, and decreased PO intake, NIHSS = 4. Stroke code activated. CTH revealed hyperdensity in the left carotid terminus could reflect underlying thrombosis, atherosclerotic disease, or artifact from adjacent bony structures, with no acute territorial infarct, intracranial hemorrhage, or midline shift. 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 The gradual loss of vascular enhancement in the proximal left cervical ICA could reflect pseudoocclusion. CTP revealed delayed perfusion in the left frontotemporal regions along the MCA territory measuring 81 mL. No perfusion evidence of core infarct. Risks versus benefits of tPA discussed with daughter, tPA administered @ 1630. Code NI actual for Left ICA occlusion, cancelled 2/2 improving NIHSS. Patient examined by NCC team during tPA administration, NIHSS = 1 for notable LUE drift. Admit to NCCU for close neurological monitoring and optimization.     Interval events: At 1700, patient aphasic, dilated left pupil, dense right-sided hemiplegia, and decreased sensation right lower arm and RLE, however still able to follow commands on left side. Repeat CTH revealed no acute territorial infarct, intracranial hemorrhage, or midline shift, with retained contrast in the intracranial vessels. Code NI reactivated. Patient taken for mechanical thrombectomy.     OVERNIGHT EVENTS: CT Head at 00:00- stable. CT head this am pending, appears stable. Started on levo last night for HOTN    ADMISSION SCORES:   NIHSS: 4 -> 21 -> currently 9 post NIH    SEDATION SCORES:  RASS: CAM-ICU:     REVIEW OF SYSTEMS:    VITALS: [] Reviewed    IMAGING/DATA: [] Reviewed    IVF FLUIDS/MEDICATIONS: [] Reviewed    ALLERGIES: No Known Allergies    DEVICES:   [] Restraints [] PIVs [] ET tube [] central line [] PICC [] arterial line [] cannon [] NGT/OGT [] EVD [] LD [] CESAR/HMV [] LiCOX [] ICP monitor [] Trach [] PEG [] Chest Tube [] other:    EXAMINATION:  General: No acute distress  HEENT: Anicteric sclerae  Cardiac: [id number]  Lungs: Clear  Abdomen: Soft, non-tender, +BS  Extremities: No c/c/e  Skin/Incision Site: Clean, dry and intact  Neurologic: awake, and alert. Aphasic, Intermittently Follows commands, able to track bilaterally. left pupils 4 mm briskly reactive, right pupil 2 mm briskly reactive, eyes midline. LUE 5/5, RUE AG, 4-/5 +drift, does not hit bed. LLE 4/5, RLE AG 3-/5, drift does not hit bed.     ICU Vital Signs Last 24 Hrs  T(C): 36.7 (23 Jun 2022 04:00), Max: 36.8 (22 Jun 2022 20:30)  T(F): 98 (23 Jun 2022 04:00), Max: 98.2 (22 Jun 2022 20:30)  HR: 84 (23 Jun 2022 07:00) (58 - 122)  BP: 106/46 (23 Jun 2022 07:00) (82/49 - 160/73)  BP(mean): 70 (23 Jun 2022 07:00) (55 - 104)  ABP: --  ABP(mean): --  RR: 17 (23 Jun 2022 07:00) (15 - 27)  SpO2: 94% (23 Jun 2022 07:00) (94% - 100%)      06-22-22 @ 07:01  -  06-23-22 @ 07:00  --------------------------------------------------------  IN: 2161.7 mL / OUT: 0 mL / NET: 2161.7 mL      LABS:  Na: 141 (06-23 @ 05:02), 139 (06-22 @ 23:33), 135 (06-22 @ 16:45)  K: 3.7 (06-23 @ 05:02), 4.2 (06-22 @ 23:33), 3.5 (06-22 @ 16:45)  Cl: 107 (06-23 @ 05:02), 104 (06-22 @ 23:33), 98 (06-22 @ 16:45)  CO2: 24 (06-23 @ 05:02), 23 (06-22 @ 23:33), 25 (06-22 @ 16:45)  BUN: 9 (06-23 @ 05:02), 11 (06-22 @ 23:33), 12 (06-22 @ 16:45)  Cr: <0.5 (06-23 @ 05:02), 0.5 (06-22 @ 23:33), 0.5 (06-22 @ 16:45)  Glu: 104(06-23 @ 05:02), 131(06-22 @ 23:33), 130(06-22 @ 16:45)    Hgb: 11.1 (06-23 @ 05:02), 11.5 (06-22 @ 23:33), 12.7 (06-22 @ 16:45)  Hct: 32.9 (06-23 @ 05:02), 33.4 (06-22 @ 23:33), 36.9 (06-22 @ 16:45)  WBC: 9.62 (06-23 @ 05:02), 12.06 (06-22 @ 23:33), 11.31 (06-22 @ 16:45)  Plt: 215 (06-23 @ 05:02), 220 (06-22 @ 23:33), 263 (06-22 @ 16:45)    INR: 1.30 06-22-22 @ 16:45  PTT: 28.0 06-22-22 @ 16:45    LIVER FUNCTIONS - ( 23 Jun 2022 05:02 )  Alb: 3.7 g/dL / Pro: 5.3 g/dL / ALK PHOS: 47 U/L / ALT: 21 U/L / AST: 20 U/L / GGT: x             Mode: AC/ CMV (Assist Control/ Continuous Mandatory Ventilation), RR (machine): 14, TV (machine): 400, FiO2: 60, PEEP: 5, ITime: 1, MAP: 12, PIP: 36    MEDICATIONS  (STANDING):  alteplase    Bolus 6.5 milliGRAM(s) IV Bolus Once  alteplase    IVPB 58.4 milliGRAM(s) IV Intermittent Once  atorvastatin 80 milliGRAM(s) Oral at bedtime  chlorhexidine 4% Liquid 1 Application(s) Topical <User Schedule>  magnesium sulfate  IVPB 2 Gram(s) IV Intermittent once  norepinephrine Infusion 0.05 MICROgram(s)/kG/Min (6.76 mL/Hr) IV Continuous <Continuous>  ondansetron Injectable 4 milliGRAM(s) IV Push once  polyethylene glycol 3350 17 Gram(s) Oral every 12 hours  potassium chloride  20 mEq/100 mL IVPB 20 milliEquivalent(s) IV Intermittent once  senna 2 Tablet(s) Oral at bedtime  sodium chloride 0.9% Bolus 1000 milliLiter(s) IV Bolus once  sodium chloride 0.9% Bolus 500 milliLiter(s) IV Bolus once  sodium chloride 0.9%. 1000 milliLiter(s) (75 mL/Hr) IV Continuous <Continuous>    MEDICATIONS  (PRN):                 SUMMARY: 81-year-old female with PMHx HLD p/w dysarthria, mild right facial droop, and RUE/RLE weakness, LKW 1330, a/w upset stomach, nausea, and decreased PO intake, NIHSS = 4. Stroke code activated. CTH revealed hyperdensity in the left carotid terminus could reflect underlying thrombosis, atherosclerotic disease, or artifact from adjacent bony structures, with no acute territorial infarct, intracranial hemorrhage, or midline shift. 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 The gradual loss of vascular enhancement in the proximal left cervical ICA could reflect pseudoocclusion. CTP revealed delayed perfusion in the left frontotemporal regions along the MCA territory measuring 81 mL. No perfusion evidence of core infarct. Risks versus benefits of tPA discussed with daughter, tPA administered @ 1630. Code NI actual for Left ICA occlusion, cancelled 2/2 improving NIHSS. Patient examined by NCC team during tPA administration, NIHSS = 1 for notable LUE drift. Admit to NCCU for close neurological monitoring and optimization.     Interval events: At 1700, patient aphasic, dilated left pupil, dense right-sided hemiplegia, and decreased sensation right lower arm and RLE, however still able to follow commands on left side. Repeat CTH revealed no acute territorial infarct, intracranial hemorrhage, or midline shift, with retained contrast in the intracranial vessels. Code NI reactivated. Patient taken for mechanical thrombectomy.     OVERNIGHT EVENTS: CT Head at 00:00- stable. CT head this am pending, appears stable. Started on levo last night for HOTN    ADMISSION SCORES:   NIHSS: 4 -> 21 -> currently 9 post NIH    SEDATION SCORES:  RASS: CAM-ICU:     REVIEW OF SYSTEMS:    VITALS: [] Reviewed    IMAGING/DATA: [] Reviewed    IVF FLUIDS/MEDICATIONS: [] Reviewed    ALLERGIES: No Known Allergies    DEVICES:   [] Restraints [] PIVs [] ET tube [] central line [] PICC [] arterial line [] cannon [] NGT/OGT [] EVD [] LD [] CESAR/HMV [] LiCOX [] ICP monitor [] Trach [] PEG [] Chest Tube [] other:    EXAMINATION:  General: No acute distress  HEENT: Anicteric sclerae  Cardiac: [id number]  Lungs: Clear  Abdomen: Soft, non-tender, +BS  Extremities: No c/c/e  Skin/Incision Site: Clean, dry and intact  Neurologic: awake, and alert. Aphasic, right facial,  Intermittently Follows commands, able to track bilaterally. left pupils 4 mm surgical, right pupil 2 mm briskly reactive, eyes midline. LUE 5/5, RUE AG, 4-/5 +drift, does not hit bed. LLE 4/5, RLE AG 3-/5, drift does not hit bed.     ICU Vital Signs Last 24 Hrs  T(C): 36.7 (23 Jun 2022 04:00), Max: 36.8 (22 Jun 2022 20:30)  T(F): 98 (23 Jun 2022 04:00), Max: 98.2 (22 Jun 2022 20:30)  HR: 84 (23 Jun 2022 07:00) (58 - 122)  BP: 106/46 (23 Jun 2022 07:00) (82/49 - 160/73)  BP(mean): 70 (23 Jun 2022 07:00) (55 - 104)  ABP: --  ABP(mean): --  RR: 17 (23 Jun 2022 07:00) (15 - 27)  SpO2: 94% (23 Jun 2022 07:00) (94% - 100%)      06-22-22 @ 07:01  -  06-23-22 @ 07:00  --------------------------------------------------------  IN: 2161.7 mL / OUT: 0 mL / NET: 2161.7 mL      LABS:  Na: 141 (06-23 @ 05:02), 139 (06-22 @ 23:33), 135 (06-22 @ 16:45)  K: 3.7 (06-23 @ 05:02), 4.2 (06-22 @ 23:33), 3.5 (06-22 @ 16:45)  Cl: 107 (06-23 @ 05:02), 104 (06-22 @ 23:33), 98 (06-22 @ 16:45)  CO2: 24 (06-23 @ 05:02), 23 (06-22 @ 23:33), 25 (06-22 @ 16:45)  BUN: 9 (06-23 @ 05:02), 11 (06-22 @ 23:33), 12 (06-22 @ 16:45)  Cr: <0.5 (06-23 @ 05:02), 0.5 (06-22 @ 23:33), 0.5 (06-22 @ 16:45)  Glu: 104(06-23 @ 05:02), 131(06-22 @ 23:33), 130(06-22 @ 16:45)    Hgb: 11.1 (06-23 @ 05:02), 11.5 (06-22 @ 23:33), 12.7 (06-22 @ 16:45)  Hct: 32.9 (06-23 @ 05:02), 33.4 (06-22 @ 23:33), 36.9 (06-22 @ 16:45)  WBC: 9.62 (06-23 @ 05:02), 12.06 (06-22 @ 23:33), 11.31 (06-22 @ 16:45)  Plt: 215 (06-23 @ 05:02), 220 (06-22 @ 23:33), 263 (06-22 @ 16:45)    INR: 1.30 06-22-22 @ 16:45  PTT: 28.0 06-22-22 @ 16:45    LIVER FUNCTIONS - ( 23 Jun 2022 05:02 )  Alb: 3.7 g/dL / Pro: 5.3 g/dL / ALK PHOS: 47 U/L / ALT: 21 U/L / AST: 20 U/L / GGT: x             Mode: AC/ CMV (Assist Control/ Continuous Mandatory Ventilation), RR (machine): 14, TV (machine): 400, FiO2: 60, PEEP: 5, ITime: 1, MAP: 12, PIP: 36    MEDICATIONS  (STANDING):  alteplase    Bolus 6.5 milliGRAM(s) IV Bolus Once  alteplase    IVPB 58.4 milliGRAM(s) IV Intermittent Once  atorvastatin 80 milliGRAM(s) Oral at bedtime  chlorhexidine 4% Liquid 1 Application(s) Topical <User Schedule>  magnesium sulfate  IVPB 2 Gram(s) IV Intermittent once  norepinephrine Infusion 0.05 MICROgram(s)/kG/Min (6.76 mL/Hr) IV Continuous <Continuous>  ondansetron Injectable 4 milliGRAM(s) IV Push once  polyethylene glycol 3350 17 Gram(s) Oral every 12 hours  potassium chloride  20 mEq/100 mL IVPB 20 milliEquivalent(s) IV Intermittent once  senna 2 Tablet(s) Oral at bedtime  sodium chloride 0.9% Bolus 1000 milliLiter(s) IV Bolus once  sodium chloride 0.9% Bolus 500 milliLiter(s) IV Bolus once  sodium chloride 0.9%. 1000 milliLiter(s) (75 mL/Hr) IV Continuous <Continuous>    MEDICATIONS  (PRN):

## 2022-06-23 NOTE — SWALLOW BEDSIDE ASSESSMENT ADULT - NS SPL SWALLOW CLINIC TRIAL FT
+resistant to po trials, +toleration of mildly thick liquids and puree w/o overt s/s of penetration/aspiration, +overt s/s of penetration/aspiration w/ thin liquids, minimal amt of soft solids accepted. +munching and w/o rotary mastication

## 2022-06-23 NOTE — OCCUPATIONAL THERAPY INITIAL EVALUATION ADULT - PERTINENT HX OF CURRENT PROBLEM, REHAB EVAL
81-year-old female p/w dysarthria, mild right facial droop, and RUE/RLE weakness. 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 After tpa, patient became aphasic, dilated left pupil, dense right-sided hemiplegia, and decreased sensation right lower arm and RLE, however still able to follow commands on left side. Code NI reactivated. Patient taken for mechanical thrombectomy..

## 2022-06-23 NOTE — PROGRESS NOTE ADULT - ASSESSMENT
ASSESSMENT: 81-year-old female p/w dysarthria, mild right facial droop, and RUE/RLE weakness. 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 s/p tpa @ 1630, 6/22. No NI intervention 2/2 NIHSS improving from 4 --> 1. After tpa, patient became aphasic, dilated left pupil, dense right-sided hemiplegia, and decreased sensation right lower arm and RLE, however still able to follow commands on left side. Code NI reactivated. Patient taken for mechanical thrombectomy. Left proximal ICA/ M1 occlusion with TICI  0-2C achieved in 2 passes. 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    PLAN:   NEURO:  - V/S, Neurochecks w/ NIHSS, groin checks, access site assessment, abdominal and pelvic tenderness, distal pulses and neurovascular assessment:         - [id number] x 2 hours         - [id number] x 6 hours         - q1hour x 16 hours  - Assess for s/s limb ischemia, hematoma, or bleeding, if present, please call NCC/Neurology  - Keep limb straight for 6 hours via immobilizer  - [place] Trendelenburg x 6 hours   - post tPA management:         - Monitor for any signs/symptoms of systemic bleeding         - No anticoagulation or antiplatelet agents x24 hrs         - Avoid central lines, arterial lines, NG tubes, and cannon catheters for 24 hours post-tPA, unless medically necessary         - Plan for repeat CT head 24 hrs post-tPA administration  - MRI brain when able   - 24 hours TPA scan today 4:30 pm   - Seizure precautions  - ICU delirium precautions  Activity: [X] Increase as tolerated [] Bedrest [X] PT [X] OT [] PMNR    PULM:  - HOB > 35 degrees  - Aspiration precautions  - Keep SaO2 > 95%  - s/p extubation post NI procedure     CV:  - Keep SBP < 140, Keep MAP 65-70  - Levophed   - Telemetry monitoring  - 12-lead ECG  - TTE/2D echo  - Lipid profile  - h/o HLD, restart home statin    RENAL:  - Strict I/Os, daily weights  - Keep euvolemic  - Keep normonatremic   - Keep Magnesium level > 2  - Monitor lytes, replete as needed  - NS @ 75cc/hr    GI:  Diet: Dysphagia screen and then advance diet as tolerated  GI prophylaxis [X] not indicated [] PPI [] other:  Bowel regimen [X] Miralax [X] senna [] other:    ENDO:   - Goal euglycemia (-180)  - HgA1c, TSH    HEME/ONC:  VTE prophylaxis: [X] SCDs [] chemoprophylaxis [] hold chemoprophylaxis due to: [] high risk of DVT/PE on admission due to:  - Hold A/C and A/P for now  - VA Duplex B/L LE    ID:  - Keep normothermic, avoid fevers    MISC:  PT/OT/SLP    CODE STATUS:  [X] Full Code [] DNR [] DNI [] Palliative/Comfort Care    DISPOSITION:  [X] NCCU [] Stroke Unit [] Floor [] EMU [] RCU [] PCU     ASSESSMENT: 81-year-old female p/w dysarthria, mild right facial droop, and RUE/RLE weakness. 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 s/p tpa @ 1630, 6/22. No NI intervention 2/2 NIHSS improving from 4 --> 1. After tpa, patient became aphasic, dilated left pupil, dense right-sided hemiplegia, and decreased sensation right lower arm and RLE, however still able to follow commands on left side. Code NI reactivated. Patient taken for mechanical thrombectomy. Left proximal ICA/ M1 occlusion with TICI  0-2C achieved in 2 passes. 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    PLAN:   NEURO:  - Neuro checks Q1  - MRI brain when able   - 24 hours TPA scan today 4:30 pm   - Seizure precautions, Keppra 500 BID x 7 days  - ICU delirium precautions  - PT/OT  Activity: [X] Increase as tolerated [] Bedrest [X] PT [X] OT [] PMNR    PULM:  - HOB > 35 degrees  - Aspiration precautions  - Keep SaO2 > 95%  - s/p extubation post NI procedure     CV:  - Keep SBP < 140, Keep MAP 65-70  - Levophed   - Telemetry monitoring  - 12-lead ECG  - TTE/2D echo  - Lipid profile  - Statin     RENAL:  - Strict I/Os, daily weights  - Keep euvolemic  - Keep normonatremic   - Keep Magnesium level > 2  - Monitor lytes, replete as needed  - NS @ 75cc/hr    GI:  Diet: Dysphagia screen and then advance diet as tolerated  GI prophylaxis [X] not indicated [] PPI [] other:  Bowel regimen [X] Miralax [X] senna [] other:    ENDO:   - Goal euglycemia (-180)  - HgA1c, TSH    HEME/ONC:  VTE prophylaxis: [X] SCDs [] chemoprophylaxis [] hold chemoprophylaxis due to: [] high risk of DVT/PE on admission due to:  - Hold A/C and A/P for now  - VA Duplex B/L LE    ID:  - Keep normothermic, avoid fevers    MISC:  PT/OT/SLP    CODE STATUS:  [X] Full Code [] DNR [] DNI [] Palliative/Comfort Care    DISPOSITION:  [X] NCCU [] Stroke Unit [] Floor [] EMU [] RCU [] PCU

## 2022-06-23 NOTE — OCCUPATIONAL THERAPY INITIAL EVALUATION ADULT - NS ASR FOLLOW COMMAND OT EVAL
decreased sequencing, decreased problem solving/25% of the time/able to follow single-step instructions/aphasia/oral apraxia

## 2022-06-23 NOTE — PROVIDER CONTACT NOTE (OTHER) - ASSESSMENT
MAP <60
MAP <65, patient's exam stable
SBP: 142, pt's exam remains stable, in no acute distress at this time
MAP <65, patient carlene stable exam
drift on R leg
R leg drift
MAP <60 despite bolusing 1L of NS

## 2022-06-23 NOTE — PROVIDER CONTACT NOTE (OTHER) - ACTION/TREATMENT ORDERED:
KARL Romero notified, stated to notify her if MAP <65 @ 2100, no further intervention at this time
KARL Romero notified, NS changed from 75 to 100 mL/h- stated to notify her if MAP <60 @ 2230
KARL Romero notified, assessed pt at bedside, is aware of weakness on R leg 2/2 L sided SAH - stated to continue monitoring no further intervention at this time - agrees that pt waxes and wanes w/R leg
KARL Romero notified, stated to continue monitoring no further intervention at this time
KARL Romero notified, stated to notify her if MAP <60 @ 2200, no further intervention at this time
KARL Romreo notified, stated to start Levophed gtt and to notify if MAP <60
KARL Romero notified, assessed pt at bedside, is aware of weakness on R leg 2/2 L sided SAH - stated to continue monitoring no further intervention at this time

## 2022-06-23 NOTE — PHYSICAL THERAPY INITIAL EVALUATION ADULT - SPECIFY REASON(S)
Per RN, pt should remain in bed post thrombectomy until 8PM tonight. PT will f/u as appropriate. Per ROSARIO Burton, the medical team wants pt to remain in bed post thrombectomy until 8PM tonight. PT will f/u as appropriate.

## 2022-06-23 NOTE — PROGRESS NOTE ADULT - SUBJECTIVE AND OBJECTIVE BOX
Neuroendovascular progress note:     Interval HPI:   Patient is a 81 year old female history of HLD presented with dysarthria, mild right facial and RUE/RLE weakness, code stroke called NIHSS 4 CTH revealed hyperdensity in the left carotid terminus possibly reflecting underlying thromosis, athero disease, or artifact without acute territorial infarct, hemorrhage or MLS. CTA head/neck reporting occlusion of the left ICA extending into the carotid terminus and proximal left M1 MCA. CTP + for delayed perfusion in the left frontotemporal regions along the MCA territory 81 ml without core infarc. Patient was not initially brought for mechanical thrombectomy due to low and improving nihss. Following tPA administration patient became acutely aphasic with dilated left pupil, dense right hemiplegia, worsened NIHSS, CTH without change, patient brought for emergent mechanical thrombectomy with the neuroendovascular team / Dr. Campos. TICI 2 c achieved in the left carotid terminus/MCA after one pass, followed by another pass with an attempt to recanalize distal artery. Post procedure CTH revealed acute SAH in the left sylvian fissure and adjacent left frontal temporal and parietal sucli,, since stable on repeat scans.     Past medical history   Hyperlipidemia    Overnight Events: started on levophed     Medications   atorvastatin 10 milliGRAM(s) Oral at bedtime  chlorhexidine 4% Liquid 1 Application(s) Topical <User Schedule>  levETIRAcetam  IVPB 500 milliGRAM(s) IV Intermittent every 12 hours  norepinephrine Infusion 0.05 MICROgram(s)/kG/Min IV Continuous <Continuous>  ondansetron Injectable 4 milliGRAM(s) IV Push once  polyethylene glycol 3350 17 Gram(s) Oral every 12 hours  senna 2 Tablet(s) Oral at bedtime  sodium chloride 0.9% Bolus 1000 milliLiter(s) IV Bolus once  sodium chloride 0.9% Bolus 500 milliLiter(s) IV Bolus once  sodium chloride 0.9%. 1000 milliLiter(s) IV Continuous <Continuous>    Vitals   T(F): 99.7 (06-23-22 @ 16:00), Max: 99.7 (06-23-22 @ 16:00)  HR: 72 (06-23-22 @ 17:00) (58 - 94)  BP: 138/57 (06-23-22 @ 17:00) (82/49 - 148/68)  RR: 19 (06-23-22 @ 17:00) (15 - 27)  SpO2: 94% (06-23-22 @ 17:00) (94% - 100%)    Labs                      11.1   9.62  )-----------( 215      ( 23 Jun 2022 05:02 )             32.9   06-23  141  |  107  |  9<L>  ----------------------------<  104<H>  3.7   |  24  |  <0.5<L>  Ca    8.0<L>      23 Jun 2022 05:02  Phos  2.9     06-23  Mg     1.9     06-23  TPro  5.3<L>  /  Alb  3.7  /  TBili  0.9  /  DBili  x   /  AST  20  /  ALT  21  /  AlkPhos  47  06-23  PT/INR - ( 22 Jun 2022 16:45 )   PT: 14.90 sec;   INR: 1.30 ratio    PTT - ( 22 Jun 2022 16:45 )  PTT:28.0 sec  LIVER FUNCTIONS - ( 23 Jun 2022 05:02 )  Alb: 3.7 g/dL / Pro: 5.3 g/dL / ALK PHOS: 47 U/L / ALT: 21 U/L / AST: 20 U/L / GGT: x           Exam:   General - NAD, laying in bed   Neuro - awake, alert, follows some commands, aphasic, right facial asymmetry, midline gaze, RUE/RLE drift wiithout hitting bed, decreased strength   Extremity - right groin CDI without evidence of bleeding hematoma bruising swelling infection Distal pulses intact bilaterally, temperature and color consistent bilaterally.   Abd - NTND     Radiology:   < from: CT Head No Cont (06.23.22 @ 16:39) >    IMPRESSION:    1.  No significant interval change since the CT head performed earlier   the same day, 5:24 AM.    2.  Stable subarachnoid hemorrhage within the left sylvian fissure   extending into the frontal, temporal and parietal sulci. Stable potential   parenchymal component of hemorrhage within the left posterior subinsular   region.    3.  Stable hypoattenuation within the left basal ganglia likely   reflecting acute infarct.    4.  Stable trace layering intraventricular hemorrhage reflecting   redistribution.      < end of copied text >    Assessment:   Patient is a 81 year old female, presented with difficulty speaking improved transiently while in ED, developed full L MCA syndrome after receiving tPA, becoming endovascular candidate now POD 1 S/P emergent mechanical thrombectomy of a left ICA/MCA occlusion TICI 2c achieved per Dr. Campos. Patient post procedure with mild SAH in the left sylvian fissure stable on repeat CTH. Patient is holding to antigravity both RUE/RLE with drift, remains aphasic. Started on levophed overnight for pressure support.     Suggestions:  - No further neuroendovascular intervention is warranted at this time.   - Please continue to monitor right groin access site for signs of bleeding ecchymosis swelling oozing infection - continue to monitor intact distal pulses.   - Continue neurologic / stroke management per neuro ICU and stroke team.     [id number] with any further neuroendovascular concerns.  Neuroendovascular progress note:     Interval HPI:   Patient is a 81 year old female history of HLD presented with dysarthria, mild right facial and RUE/RLE weakness, code stroke called NIHSS 4 CTH revealed hyperdensity in the left carotid terminus possibly reflecting underlying thromosis, athero disease, or artifact without acute territorial infarct, hemorrhage or MLS. CTA head/neck reporting occlusion of the left ICA extending into the carotid terminus and proximal left M1 MCA. CTP + for delayed perfusion in the left frontotemporal regions along the MCA territory 81 ml without core infarc. Patient was not initially brought for mechanical thrombectomy due to low and improving nihss. Following tPA administration patient became acutely aphasic with dilated left pupil, dense right hemiplegia, worsened NIHSS, CTH without change, patient brought for emergent mechanical thrombectomy with the neuroendovascular team / Dr. Campos. TICI 2 c achieved in the left carotid terminus/MCA after one pass, followed by another pass with an attempt to recanalize distal artery. Post procedure CTH revealed acute SAH in the left sylvian fissure and adjacent left frontal temporal and parietal sucli,, since stable on repeat scans.     Past medical history   Hyperlipidemia    Overnight Events: started on levophed     Medications   atorvastatin 10 milliGRAM(s) Oral at bedtime  chlorhexidine 4% Liquid 1 Application(s) Topical <User Schedule>  levETIRAcetam  IVPB 500 milliGRAM(s) IV Intermittent every 12 hours  norepinephrine Infusion 0.05 MICROgram(s)/kG/Min IV Continuous <Continuous>  ondansetron Injectable 4 milliGRAM(s) IV Push once  polyethylene glycol 3350 17 Gram(s) Oral every 12 hours  senna 2 Tablet(s) Oral at bedtime  sodium chloride 0.9% Bolus 1000 milliLiter(s) IV Bolus once  sodium chloride 0.9% Bolus 500 milliLiter(s) IV Bolus once  sodium chloride 0.9%. 1000 milliLiter(s) IV Continuous <Continuous>    Vitals   T(F): 99.7 (06-23-22 @ 16:00), Max: 99.7 (06-23-22 @ 16:00)  HR: 72 (06-23-22 @ 17:00) (58 - 94)  BP: 138/57 (06-23-22 @ 17:00) (82/49 - 148/68)  RR: 19 (06-23-22 @ 17:00) (15 - 27)  SpO2: 94% (06-23-22 @ 17:00) (94% - 100%)    Labs                      11.1   9.62  )-----------( 215      ( 23 Jun 2022 05:02 )             32.9   06-23  141  |  107  |  9<L>  ----------------------------<  104<H>  3.7   |  24  |  <0.5<L>  Ca    8.0<L>      23 Jun 2022 05:02  Phos  2.9     06-23  Mg     1.9     06-23  TPro  5.3<L>  /  Alb  3.7  /  TBili  0.9  /  DBili  x   /  AST  20  /  ALT  21  /  AlkPhos  47  06-23  PT/INR - ( 22 Jun 2022 16:45 )   PT: 14.90 sec;   INR: 1.30 ratio    PTT - ( 22 Jun 2022 16:45 )  PTT:28.0 sec  LIVER FUNCTIONS - ( 23 Jun 2022 05:02 )  Alb: 3.7 g/dL / Pro: 5.3 g/dL / ALK PHOS: 47 U/L / ALT: 21 U/L / AST: 20 U/L / GGT: x           Exam:   General - NAD, laying in bed   Neuro - awake, alert, follows some commands, aphasic, right facial asymmetry, midline gaze, RUE/RLE drift wiithout hitting bed, decreased strength   Extremity - right groin CDI without evidence of bleeding hematoma bruising swelling infection Distal pulses intact bilaterally, temperature and color consistent bilaterally.   Abd - NTND     Radiology:   < from: CT Head No Cont (06.23.22 @ 16:39) >    IMPRESSION:    1.  No significant interval change since the CT head performed earlier   the same day, 5:24 AM.    2.  Stable subarachnoid hemorrhage within the left sylvian fissure   extending into the frontal, temporal and parietal sulci. Stable potential   parenchymal component of hemorrhage within the left posterior subinsular   region.    3.  Stable hypoattenuation within the left basal ganglia likely   reflecting acute infarct.    4.  Stable trace layering intraventricular hemorrhage reflecting   redistribution.      < end of copied text >    Assessment:   Patient is a 81 year old female, presented with difficulty speaking improved transiently while in ED, developed full L MCA syndrome after receiving tPA, becoming endovascular candidate now POD 1 S/P emergent mechanical thrombectomy of a left ICA/MCA occlusion TICI 2c achieved per Dr. Campos. Patient post procedure with mild SAH in the left sylvian fissure stable on repeat CTH. Patient is holding to antigravity both RUE/RLE with drift, remains aphasic. Started on levophed overnight for pressure support.     Suggestions:  - No further neuroendovascular intervention is warranted at this time.   - Please continue to monitor right groin access site for signs of bleeding ecchymosis swelling oozing infection - continue to monitor intact distal pulses.   - Management per NCC Team and then stroke team once patient is transferred to the Stroke team.     [id number] with any further neuroendovascular concerns.

## 2022-06-23 NOTE — OCCUPATIONAL THERAPY INITIAL EVALUATION ADULT - GENERAL OBSERVATIONS, REHAB EVAL
Pt received semi fall in bed in NAD, lethargic, but arouses to voice/touch, daughter at bedside, agreeable to OT evaluation, +tele, +BP cuff, +pulse oxi, left semi fall in bed in NAD, all lines intact, vitals stable

## 2022-06-24 LAB
ALBUMIN SERPL ELPH-MCNC: 3.5 G/DL — SIGNIFICANT CHANGE UP (ref 3.5–5.2)
ALP SERPL-CCNC: 46 U/L — SIGNIFICANT CHANGE UP (ref 30–115)
ALT FLD-CCNC: 14 U/L — SIGNIFICANT CHANGE UP (ref 0–41)
ANION GAP SERPL CALC-SCNC: 14 MMOL/L — SIGNIFICANT CHANGE UP (ref 7–14)
AST SERPL-CCNC: 14 U/L — SIGNIFICANT CHANGE UP (ref 0–41)
BILIRUB SERPL-MCNC: 0.8 MG/DL — SIGNIFICANT CHANGE UP (ref 0.2–1.2)
BUN SERPL-MCNC: 10 MG/DL — SIGNIFICANT CHANGE UP (ref 10–20)
CALCIUM SERPL-MCNC: 8.1 MG/DL — LOW (ref 8.5–10.1)
CHLORIDE SERPL-SCNC: 107 MMOL/L — SIGNIFICANT CHANGE UP (ref 98–110)
CO2 SERPL-SCNC: 21 MMOL/L — SIGNIFICANT CHANGE UP (ref 17–32)
CREAT SERPL-MCNC: <0.5 MG/DL — LOW (ref 0.7–1.5)
EGFR: 101 ML/MIN/1.73M2 — SIGNIFICANT CHANGE UP
GLUCOSE BLDC GLUCOMTR-MCNC: 137 MG/DL — HIGH (ref 70–99)
GLUCOSE SERPL-MCNC: 102 MG/DL — HIGH (ref 70–99)
HCT VFR BLD CALC: 30.5 % — LOW (ref 37–47)
HGB BLD-MCNC: 10.5 G/DL — LOW (ref 12–16)
MAGNESIUM SERPL-MCNC: 2.2 MG/DL — SIGNIFICANT CHANGE UP (ref 1.8–2.4)
MCHC RBC-ENTMCNC: 30.5 PG — SIGNIFICANT CHANGE UP (ref 27–31)
MCHC RBC-ENTMCNC: 34.4 G/DL — SIGNIFICANT CHANGE UP (ref 32–37)
MCV RBC AUTO: 88.7 FL — SIGNIFICANT CHANGE UP (ref 81–99)
NRBC # BLD: 0 /100 WBCS — SIGNIFICANT CHANGE UP (ref 0–0)
PHOSPHATE SERPL-MCNC: 2.6 MG/DL — SIGNIFICANT CHANGE UP (ref 2.1–4.9)
PLATELET # BLD AUTO: 210 K/UL — SIGNIFICANT CHANGE UP (ref 130–400)
POTASSIUM SERPL-MCNC: 3.3 MMOL/L — LOW (ref 3.5–5)
POTASSIUM SERPL-SCNC: 3.3 MMOL/L — LOW (ref 3.5–5)
PROT SERPL-MCNC: 5.8 G/DL — LOW (ref 6–8)
RBC # BLD: 3.44 M/UL — LOW (ref 4.2–5.4)
RBC # FLD: 12.6 % — SIGNIFICANT CHANGE UP (ref 11.5–14.5)
SODIUM SERPL-SCNC: 142 MMOL/L — SIGNIFICANT CHANGE UP (ref 135–146)
WBC # BLD: 10.07 K/UL — SIGNIFICANT CHANGE UP (ref 4.8–10.8)
WBC # FLD AUTO: 10.07 K/UL — SIGNIFICANT CHANGE UP (ref 4.8–10.8)

## 2022-06-24 PROCEDURE — 70551 MRI BRAIN STEM W/O DYE: CPT | Mod: 26

## 2022-06-24 RX ORDER — NICOTINE POLACRILEX 2 MG
1 GUM BUCCAL DAILY
Refills: 0 | Status: DISCONTINUED | OUTPATIENT
Start: 2022-06-24 | End: 2022-06-29

## 2022-06-24 RX ORDER — LABETALOL HCL 100 MG
5 TABLET ORAL ONCE
Refills: 0 | Status: COMPLETED | OUTPATIENT
Start: 2022-06-24 | End: 2022-06-24

## 2022-06-24 RX ORDER — CALCIUM GLUCONATE 100 MG/ML
1 VIAL (ML) INTRAVENOUS ONCE
Refills: 0 | Status: COMPLETED | OUTPATIENT
Start: 2022-06-24 | End: 2022-06-24

## 2022-06-24 RX ORDER — FAMOTIDINE 10 MG/ML
20 INJECTION INTRAVENOUS DAILY
Refills: 0 | Status: DISCONTINUED | OUTPATIENT
Start: 2022-06-24 | End: 2022-06-29

## 2022-06-24 RX ORDER — POTASSIUM CHLORIDE 20 MEQ
20 PACKET (EA) ORAL
Refills: 0 | Status: COMPLETED | OUTPATIENT
Start: 2022-06-24 | End: 2022-06-24

## 2022-06-24 RX ORDER — HEPARIN SODIUM 5000 [USP'U]/ML
5000 INJECTION INTRAVENOUS; SUBCUTANEOUS EVERY 12 HOURS
Refills: 0 | Status: DISCONTINUED | OUTPATIENT
Start: 2022-06-24 | End: 2022-06-29

## 2022-06-24 RX ADMIN — HEPARIN SODIUM 5000 UNIT(S): 5000 INJECTION INTRAVENOUS; SUBCUTANEOUS at 17:29

## 2022-06-24 RX ADMIN — ATORVASTATIN CALCIUM 10 MILLIGRAM(S): 80 TABLET, FILM COATED ORAL at 22:10

## 2022-06-24 RX ADMIN — LEVETIRACETAM 400 MILLIGRAM(S): 250 TABLET, FILM COATED ORAL at 05:10

## 2022-06-24 RX ADMIN — SENNA PLUS 2 TABLET(S): 8.6 TABLET ORAL at 22:11

## 2022-06-24 RX ADMIN — FAMOTIDINE 20 MILLIGRAM(S): 10 INJECTION INTRAVENOUS at 13:34

## 2022-06-24 RX ADMIN — Medication 100 GRAM(S): at 16:42

## 2022-06-24 RX ADMIN — Medication 50 MILLIEQUIVALENT(S): at 11:35

## 2022-06-24 RX ADMIN — Medication 50 MILLIEQUIVALENT(S): at 09:32

## 2022-06-24 RX ADMIN — Medication 5 MILLIGRAM(S): at 07:04

## 2022-06-24 RX ADMIN — Medication 50 MILLIEQUIVALENT(S): at 13:33

## 2022-06-24 RX ADMIN — POLYETHYLENE GLYCOL 3350 17 GRAM(S): 17 POWDER, FOR SOLUTION ORAL at 05:10

## 2022-06-24 RX ADMIN — LEVETIRACETAM 400 MILLIGRAM(S): 250 TABLET, FILM COATED ORAL at 17:28

## 2022-06-24 RX ADMIN — CHLORHEXIDINE GLUCONATE 1 APPLICATION(S): 213 SOLUTION TOPICAL at 05:10

## 2022-06-24 RX ADMIN — SODIUM CHLORIDE 75 MILLILITER(S): 9 INJECTION INTRAMUSCULAR; INTRAVENOUS; SUBCUTANEOUS at 09:32

## 2022-06-24 NOTE — PROGRESS NOTE ADULT - CRITICAL CARE ATTENDING COMMENT
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 Extubated successfully post operatively, mild SAH vs contrast left sylvian fissure. Neurologically, still aphasic (not been able to follow complex commands, reduced fluency and naming) with improvement in RUE and RLE weakness which is now antigravity.   Overnight, BP dropped, now requiring iV vasopressors after fluid resuscitation. Goal BP MAP of 70-80  Rest of assessment and systemic plan of care as outlined above.
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 Extubated successfully post operatively, mild SAH vs contrast left sylvian fissure. Neurologically, still aphasic (not been able to follow complex commands, reduced fluency and naming) with improvement in RUE and RLE weakness which is now antigravity.   Overnight, BP dropped, now requiring iV vasopressors after fluid resuscitation. Goal BP MAP of 70-80  Rest of assessment and systemic plan of care as outlined above.

## 2022-06-24 NOTE — PROVIDER CONTACT NOTE (OTHER) - NAME OF MD/NP/PA/DO NOTIFIED:
KARL Romero
neurology Vimal Masterson
KARL Romero

## 2022-06-24 NOTE — PHYSICAL THERAPY INITIAL EVALUATION ADULT - PERTINENT HX OF CURRENT PROBLEM, REHAB EVAL
81-year-old female p/w dysarthria, mild right facial droop, and RUE/RLE weakness. 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 After tpa, patient became aphasic, dilated left pupil, dense right-sided hemiplegia, and decreased sensation right lower arm and RLE, however still able to follow commands on left side. Code NI reactivated. Patient taken for mechanical thrombectomy..
81-year-old female p/w dysarthria, mild right facial droop, and RUE/RLE weakness. 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 After tpa, patient became aphasic, dilated left pupil, dense right-sided hemiplegia, and decreased sensation right lower arm and RLE, however still able to follow commands on left side. Code NI reactivated. Patient taken for mechanical thrombectomy..

## 2022-06-24 NOTE — PHYSICAL THERAPY INITIAL EVALUATION ADULT - ADDITIONAL COMMENTS
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 Pt lives in  with 10 [address]INA and 10 steps to bed/bath. Daughter and her family live with pt currently but will be moving in the coming weeks.

## 2022-06-24 NOTE — PHYSICAL THERAPY INITIAL EVALUATION ADULT - GENERAL OBSERVATIONS, REHAB EVAL
Pt encountered semi fall in bed in NAD, +tele, +pulse ox, daughter present at bedside, pt reverts to [place].

## 2022-06-24 NOTE — PHYSICAL THERAPY INITIAL EVALUATION ADULT - IMPAIRMENTS CONTRIBUTING TO GAIT DEVIATIONS, PT EVAL
R foot drop, R trunk lean, impulsivity noted/ataxic/impaired balance/cognition/impaired coordination/decreased flexibility/impaired motor control/abnormal muscle tone/decreased ROM/decreased strength

## 2022-06-24 NOTE — SPEECH LANGUAGE PATHOLOGY EVALUATION - SLP DIAGNOSIS
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

## 2022-06-24 NOTE — SPEECH LANGUAGE PATHOLOGY EVALUATION - SLP PERTINENT HISTORY OF CURRENT PROBLEM
Pt admitted w/ dysarthria, R facial droop, RUE and RLE weakness w/ decreased po intake. CTH: hyperdensity L carotid terminus, occlusion L ICA, s/p TPA, s/p thombectomy

## 2022-06-24 NOTE — PROVIDER CONTACT NOTE (OTHER) - REASON
MAP <60
R leg drift
B/P 148/70
SBP >140
MAP <65
drift on R leg
MAP <60
MAP <65
Eye Protection Verbiage: Before proceeding with the stage, a plastic scleral shield was inserted. The globe was anesthetized with a few drops of 1% lidocaine with 1:100,000 epinephrine. Then, an appropriate sized scleral shield was chosen and coated with lacrilube ointment. The shield was gently inserted and left in place for the duration of each stage. After the stage was completed, the shield was gently removed.

## 2022-06-24 NOTE — PROGRESS NOTE ADULT - SUBJECTIVE AND OBJECTIVE BOX
SUMMARY: 81-year-old female with PMHx HLD p/w dysarthria, mild right facial droop, and RUE/RLE weakness, LKW 1330, a/w upset stomach, nausea, and decreased PO intake, NIHSS = 4. Stroke code activated. CTH revealed hyperdensity in the left carotid terminus could reflect underlying thrombosis, atherosclerotic disease, or artifact from adjacent bony structures, with no acute territorial infarct, intracranial hemorrhage, or midline shift. 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 The gradual loss of vascular enhancement in the proximal left cervical ICA could reflect pseudoocclusion. CTP revealed delayed perfusion in the left frontotemporal regions along the MCA territory measuring 81 mL. No perfusion evidence of core infarct. Risks versus benefits of tPA discussed with daughter, tPA administered @ 1630. Code NI actual for Left ICA occlusion, cancelled 2/2 improving NIHSS. Patient examined by NCC team during tPA administration, NIHSS = 1 for notable LUE drift. Admit to NCCU for close neurological monitoring and optimization.     Interval events: At 1700, patient aphasic, dilated left pupil, dense right-sided hemiplegia, and decreased sensation right lower arm and RLE, however still able to follow commands on left side. Repeat CTH revealed no acute territorial infarct, intracranial hemorrhage, or midline shift, with retained contrast in the intracranial vessels. Code NI reactivated. Patient taken for mechanical thrombectomy.     ADMISSION SCORES:   NIHSS: 4 -> 21 -> currently 9 post NIH    SEDATION SCORES:  RASS: CAM-ICU:     ICU Vital Signs Last 24 Hrs  T(C): 37.3 (24 Jun 2022 04:00), Max: 37.6 (23 Jun 2022 16:00)  T(F): 99.2 (24 Jun 2022 04:00), Max: 99.7 (23 Jun 2022 16:00)  HR: 92 (24 Jun 2022 06:00) (66 - 92)  BP: 151/54 (24 Jun 2022 06:00) (106/46 - 155/57)  BP(mean): 74 (24 Jun 2022 06:00) (47 - 91)  RR: 19 (24 Jun 2022 06:00) (17 - 26)  SpO2: 96% (24 Jun 2022 06:00) (93% - 99%)      Mode: AC/ CMV (Assist Control/ Continuous Mandatory Ventilation), RR (machine): 14, TV (machine): 400, FiO2: 60, PEEP: 5, ITime: 1, MAP: 12, PIP: 36      22 Jun 2022 07:01  -  23 Jun 2022 07:00  --------------------------------------------------------  IN:    Norepinephrine: 1011.7 mL    sodium chloride 0.9%: 1150 mL  Total IN: 2161.7 mL    OUT:  Total OUT: 0 mL    Total NET: 2161.7 mL      23 Jun 2022 07:01  -  24 Jun 2022 06:46  --------------------------------------------------------  IN:    IV PiggyBack: 450 mL    Norepinephrine: 45.7 mL    sodium chloride 0.9%: 1800 mL  Total IN: 2295.7 mL    OUT:    Voided (mL): 900 mL  Total OUT: 900 mL    Total NET: 1395.7 mL      MEDICATIONS  (STANDING):  atorvastatin 10 milliGRAM(s) Oral at bedtime  chlorhexidine 4% Liquid 1 Application(s) Topical <User Schedule>  levETIRAcetam  IVPB 500 milliGRAM(s) IV Intermittent every 12 hours  norepinephrine Infusion 0.05 MICROgram(s)/kG/Min (6.76 mL/Hr) IV Continuous <Continuous>  ondansetron Injectable 4 milliGRAM(s) IV Push once  polyethylene glycol 3350 17 Gram(s) Oral every 12 hours  senna 2 Tablet(s) Oral at bedtime  sodium chloride 0.9% Bolus 1000 milliLiter(s) IV Bolus once  sodium chloride 0.9% Bolus 500 milliLiter(s) IV Bolus once  sodium chloride 0.9%. 1000 milliLiter(s) (75 mL/Hr) IV Continuous <Continuous>    06-23    141  |  107  |  9<L>  ----------------------------<  104<H>  3.7   |  24  |  <0.5<L>    Ca    8.0<L>      23 Jun 2022 05:02  Phos  2.9     06-23  Mg     1.9     06-23    TPro  5.3<L>  /  Alb  3.7  /  TBili  0.9  /  DBili  x   /  AST  20  /  ALT  21  /  AlkPhos  47  06-23    Na: 141 (06-23 @ 05:02), 139 (06-22 @ 23:33), 135 (06-22 @ 16:45)  K: 3.7 (06-23 @ 05:02), 4.2 (06-22 @ 23:33), 3.5 (06-22 @ 16:45)  Cl: 107 (06-23 @ 05:02), 104 (06-22 @ 23:33), 98 (06-22 @ 16:45)  CO2: 24 (06-23 @ 05:02), 23 (06-22 @ 23:33), 25 (06-22 @ 16:45)  BUN: 9 (06-23 @ 05:02), 11 (06-22 @ 23:33), 12 (06-22 @ 16:45)  Cr: <0.5 (06-23 @ 05:02), 0.5 (06-22 @ 23:33), 0.5 (06-22 @ 16:45)  Glu: 104(06-23 @ 05:02), 131(06-22 @ 23:33), 130(06-22 @ 16:45)                                10.5   10.07 )-----------( 210      ( 24 Jun 2022 04:52 )             30.5       Hgb: 11.1 (06-23 @ 05:02), 11.5 (06-22 @ 23:33), 12.7 (06-22 @ 16:45)  Hct: 32.9 (06-23 @ 05:02), 33.4 (06-22 @ 23:33), 36.9 (06-22 @ 16:45)  WBC: 9.62 (06-23 @ 05:02), 12.06 (06-22 @ 23:33), 11.31 (06-22 @ 16:45)  Plt: 215 (06-23 @ 05:02), 220 (06-22 @ 23:33), 263 (06-22 @ 16:45)    INR: 1.30 06-22-22 @ 16:45  PTT: 28.0 06-22-22 @ 16:45    LIVER FUNCTIONS - ( 23 Jun 2022 05:02 )  Alb: 3.7 g/dL / Pro: 5.3 g/dL / ALK PHOS: 47 U/L / ALT: 21 U/L / AST: 20 U/L / GGT: x          TTE Echo Complete w/o Contrast w/ Doppler (06.23.22 @ 09:29) >  Summary:   1.LV Ejection Fraction by Brody's Method with a biplane EF of 55 %.   2. Spectral Doppler shows pseudonormal pattern of left ventricular   myocardial filling (Grade II diastolic dysfunction).   3. Moderately enlarged left atrium.   4. Normal right atrial size.   5. Mild mitral annular calcification.   6. Mild mitral valve regurgitation.   7. Mild-moderate tricuspid regurgitation.   8. Sclerotic aortic valve with normal opening.   9. Mild pulmonic valve regurgitation.  10. Estimated pulmonary artery systolic pressure is 57.8 mmHg assuming a   right atrial pressure of 3 mmHg, which is consistent with moderate   pulmonary hypertension.           CT Head No Cont (06.23.22 @ 16:39) >  IMPRESSION:    1.  No significant interval change since the CT head performed earlier   the same day, 5:24 AM.    2.  Stable subarachnoid hemorrhage within the left sylvian fissure   extending into the frontal, temporal and parietal sulci. Stable potential   parenchymal component of hemorrhage within the left posterior subinsular   region.    3.  Stable hypoattenuation within the left basal ganglia likely   reflecting acute infarct.    4.  Stable trace layering intraventricular hemorrhage reflecting   redistribution.    VA Duplex Lower Ext Vein Scan, Bilat (06.23.22 @ 14:58) >    INTERPRETATION:  CLINICAL INFORMATION: 81 years old female with leg   swelling    COMPARISON: None available.    TECHNIQUE: Duplex sonography of the BILATERAL LOWER extremity veins with   color and spectral Doppler, with and without compression.    FINDINGS:    RIGHT:  Normal compressibility of the RIGHT common femoral, femoral and popliteal   veins.  Doppler examination shows normal spontaneous and phasic flow.  No RIGHT calf vein thrombosis is detected.    LEFT:  Normal compressibility of the LEFT common femoral, femoral and popliteal   veins.  Doppler examination shows normal spontaneous and phasic flow.  No LEFT calf vein thrombosis is detected.    IMPRESSION:  No evidence of deep venous thrombosis in either lower extremity.    Very difficult exam due to body habitus.       CT Angio Head w/ IV Cont (06.22.22 @ 16:27) >    IMPRESSION:    CT PERFUSION:  Delayed perfusion in the left frontotemporal regions along the MCA   territory measuring 81 mL. No perfusion evidence of core infarct.    CTA HEAD/NECK:  Findings compatible with occlusion of the left ICA extending to the   carotid terminus and proximal left M1 MCA. The gradual loss of vascular   enhancement in the proximal left cervical ICA could reflect   pseudoocclusion. Confirmation with DSA is recommended.              EXAMINATION:  General: No acute distress  HEENT: Anicteric sclerae  Cardiac: [id number]  Lungs: Clear  Abdomen: Soft, non-tender, +BS  Extremities: No c/c/e  Skin/Incision Site: Clean, dry and intact  Neurologic: awake, and alert. Aphasic, right facial,  Intermittently Follows commands, able to track bilaterally. left pupils 4 mm surgical, right pupil 2 mm briskly reactive, eyes midline. LUE 5/5, RUE AG, 4-/5 +drift, does not hit bed. LLE 4/5, RLE AG 3-/5, drift does not hit bed.        SUMMARY: 81-year-old female with PMHx HLD p/w dysarthria, mild right facial droop, and RUE/RLE weakness, LKW 1330, a/w upset stomach, nausea, and decreased PO intake, NIHSS = 4. Stroke code activated. CTH revealed hyperdensity in the left carotid terminus could reflect underlying thrombosis, atherosclerotic disease, or artifact from adjacent bony structures, with no acute territorial infarct, intracranial hemorrhage, or midline shift. 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 The gradual loss of vascular enhancement in the proximal left cervical ICA could reflect pseudoocclusion. CTP revealed delayed perfusion in the left frontotemporal regions along the MCA territory measuring 81 mL. No perfusion evidence of core infarct. Risks versus benefits of tPA discussed with daughter, tPA administered @ 1630. Code NI actual for Left ICA occlusion, cancelled 2/2 improving NIHSS. Patient examined by NCC team during tPA administration, NIHSS = 1 for notable LUE drift. Admit to NCCU for close neurological monitoring and optimization.     Interval events: At 1700, patient aphasic, dilated left pupil, dense right-sided hemiplegia, and decreased sensation right lower arm and RLE, however still able to follow commands on left side. Repeat CTH revealed no acute territorial infarct, intracranial hemorrhage, or midline shift, with retained contrast in the intracranial vessels. Code NI reactivated. Patient taken for mechanical thrombectomy.     ADMISSION SCORES:   NIHSS: 4 -> 21 -> currently 9 post NIH    SEDATION SCORES:  RASS: CAM-ICU:     ICU Vital Signs Last 24 Hrs  T(C): 37.3 (24 Jun 2022 04:00), Max: 37.6 (23 Jun 2022 16:00)  T(F): 99.2 (24 Jun 2022 04:00), Max: 99.7 (23 Jun 2022 16:00)  HR: 92 (24 Jun 2022 06:00) (66 - 92)  BP: 151/54 (24 Jun 2022 06:00) (106/46 - 155/57)  BP(mean): 74 (24 Jun 2022 06:00) (47 - 91)  RR: 19 (24 Jun 2022 06:00) (17 - 26)  SpO2: 96% (24 Jun 2022 06:00) (93% - 99%)      Mode: AC/ CMV (Assist Control/ Continuous Mandatory Ventilation), RR (machine): 14, TV (machine): 400, FiO2: 60, PEEP: 5, ITime: 1, MAP: 12, PIP: 36      22 Jun 2022 07:01  -  23 Jun 2022 07:00  --------------------------------------------------------  IN:    Norepinephrine: 1011.7 mL    sodium chloride 0.9%: 1150 mL  Total IN: 2161.7 mL    OUT:  Total OUT: 0 mL    Total NET: 2161.7 mL      23 Jun 2022 07:01  -  24 Jun 2022 06:46  --------------------------------------------------------  IN:    IV PiggyBack: 450 mL    Norepinephrine: 45.7 mL    sodium chloride 0.9%: 1800 mL  Total IN: 2295.7 mL    OUT:    Voided (mL): 900 mL  Total OUT: 900 mL    Total NET: 1395.7 mL      MEDICATIONS  (STANDING):  atorvastatin 10 milliGRAM(s) Oral at bedtime  chlorhexidine 4% Liquid 1 Application(s) Topical <User Schedule>  levETIRAcetam  IVPB 500 milliGRAM(s) IV Intermittent every 12 hours  norepinephrine Infusion 0.05 MICROgram(s)/kG/Min (6.76 mL/Hr) IV Continuous <Continuous>  ondansetron Injectable 4 milliGRAM(s) IV Push once  polyethylene glycol 3350 17 Gram(s) Oral every 12 hours  senna 2 Tablet(s) Oral at bedtime  sodium chloride 0.9% Bolus 1000 milliLiter(s) IV Bolus once  sodium chloride 0.9% Bolus 500 milliLiter(s) IV Bolus once  sodium chloride 0.9%. 1000 milliLiter(s) (75 mL/Hr) IV Continuous <Continuous>    06-23    141  |  107  |  9<L>  ----------------------------<  104<H>  3.7   |  24  |  <0.5<L>    Ca    8.0<L>      23 Jun 2022 05:02  Phos  2.9     06-23  Mg     1.9     06-23    TPro  5.3<L>  /  Alb  3.7  /  TBili  0.9  /  DBili  x   /  AST  20  /  ALT  21  /  AlkPhos  47  06-23    Na: 141 (06-23 @ 05:02), 139 (06-22 @ 23:33), 135 (06-22 @ 16:45)  K: 3.7 (06-23 @ 05:02), 4.2 (06-22 @ 23:33), 3.5 (06-22 @ 16:45)  Cl: 107 (06-23 @ 05:02), 104 (06-22 @ 23:33), 98 (06-22 @ 16:45)  CO2: 24 (06-23 @ 05:02), 23 (06-22 @ 23:33), 25 (06-22 @ 16:45)  BUN: 9 (06-23 @ 05:02), 11 (06-22 @ 23:33), 12 (06-22 @ 16:45)  Cr: <0.5 (06-23 @ 05:02), 0.5 (06-22 @ 23:33), 0.5 (06-22 @ 16:45)  Glu: 104(06-23 @ 05:02), 131(06-22 @ 23:33), 130(06-22 @ 16:45)                        10.5   10.07 )-----------( 210      ( 24 Jun 2022 04:52 )             30.5       Hgb: 11.1 (06-23 @ 05:02), 11.5 (06-22 @ 23:33), 12.7 (06-22 @ 16:45)  Hct: 32.9 (06-23 @ 05:02), 33.4 (06-22 @ 23:33), 36.9 (06-22 @ 16:45)  WBC: 9.62 (06-23 @ 05:02), 12.06 (06-22 @ 23:33), 11.31 (06-22 @ 16:45)  Plt: 215 (06-23 @ 05:02), 220 (06-22 @ 23:33), 263 (06-22 @ 16:45)    INR: 1.30 06-22-22 @ 16:45  PTT: 28.0 06-22-22 @ 16:45    LIVER FUNCTIONS - ( 23 Jun 2022 05:02 )  Alb: 3.7 g/dL / Pro: 5.3 g/dL / ALK PHOS: 47 U/L / ALT: 21 U/L / AST: 20 U/L / GGT: x          TTE Echo Complete w/o Contrast w/ Doppler (06.23.22 @ 09:29) >  Summary:   1.LV Ejection Fraction by Brody's Method with a biplane EF of 55 %.   2. Spectral Doppler shows pseudonormal pattern of left ventricular   myocardial filling (Grade II diastolic dysfunction).   3. Moderately enlarged left atrium.   4. Normal right atrial size.   5. Mild mitral annular calcification.   6. Mild mitral valve regurgitation.   7. Mild-moderate tricuspid regurgitation.   8. Sclerotic aortic valve with normal opening.   9. Mild pulmonic valve regurgitation.  10. Estimated pulmonary artery systolic pressure is 57.8 mmHg assuming a   right atrial pressure of 3 mmHg, which is consistent with moderate   pulmonary hypertension.           CT Head No Cont (06.23.22 @ 16:39) >  IMPRESSION:    1.  No significant interval change since the CT head performed earlier   the same day, 5:24 AM.    2.  Stable subarachnoid hemorrhage within the left sylvian fissure   extending into the frontal, temporal and parietal sulci. Stable potential   parenchymal component of hemorrhage within the left posterior subinsular   region.    3.  Stable hypoattenuation within the left basal ganglia likely   reflecting acute infarct.    4.  Stable trace layering intraventricular hemorrhage reflecting   redistribution.    VA Duplex Lower Ext Vein Scan, Bilat (06.23.22 @ 14:58) >    INTERPRETATION:  CLINICAL INFORMATION: 81 years old female with leg   swelling    COMPARISON: None available.    TECHNIQUE: Duplex sonography of the BILATERAL LOWER extremity veins with   color and spectral Doppler, with and without compression.    FINDINGS:    RIGHT:  Normal compressibility of the RIGHT common femoral, femoral and popliteal   veins.  Doppler examination shows normal spontaneous and phasic flow.  No RIGHT calf vein thrombosis is detected.    LEFT:  Normal compressibility of the LEFT common femoral, femoral and popliteal   veins.  Doppler examination shows normal spontaneous and phasic flow.  No LEFT calf vein thrombosis is detected.    IMPRESSION:  No evidence of deep venous thrombosis in either lower extremity.    Very difficult exam due to body habitus.      CT Angio Head w/ IV Cont (06.22.22 @ 16:27) >    IMPRESSION:    CT PERFUSION:  Delayed perfusion in the left frontotemporal regions along the MCA   territory measuring 81 mL. No perfusion evidence of core infarct.    CTA HEAD/NECK:  Findings compatible with occlusion of the left ICA extending to the   carotid terminus and proximal left M1 MCA. The gradual loss of vascular   enhancement in the proximal left cervical ICA could reflect   pseudoocclusion. Confirmation with DSA is recommended.              EXAMINATION:  General: No acute distress  HEENT: Anicteric sclerae  Cardiac: [id number]  Lungs: Clear  Abdomen: Soft, non-tender, +BS  Extremities: No c/c/e  Skin/Incision Site: Clean, dry and intact  Neurologic: awake, and alert. Aphasic, right facial,  Intermittently Follows commands, able to track bilaterally. left pupils 4 mm surgical, right pupil 2 mm briskly reactive, eyes midline. LUE 5/5, RUE AG, 4-/5 +drift, does not hit bed. LLE 4/5, RLE AG 3-/5, drift does not hit bed.        SUMMARY: 81-year-old female with PMHx HLD p/w dysarthria, mild right facial droop, and RUE/RLE weakness, LKW 1330, a/w upset stomach, nausea, and decreased PO intake, NIHSS = 4. Stroke code activated. CTH revealed hyperdensity in the left carotid terminus could reflect underlying thrombosis, atherosclerotic disease, or artifact from adjacent bony structures, with no acute territorial infarct, intracranial hemorrhage, or midline shift. 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 The gradual loss of vascular enhancement in the proximal left cervical ICA could reflect pseudoocclusion. CTP revealed delayed perfusion in the left frontotemporal regions along the MCA territory measuring 81 mL. No perfusion evidence of core infarct. Risks versus benefits of tPA discussed with daughter, tPA administered @ 1630 6/22. Code NI actual for Left ICA occlusion, cancelled 2/2 improving NIHSS. Patient examined by NCC team during tPA administration, NIHSS = 1 for notable LUE drift. Admit to NCCU for close neurological monitoring and optimization.     Interval events:  6/22 patient aphasic, dilated left pupil, dense right-sided hemiplegia, and decreased sensation right lower arm and RLE, however still able to follow commands on left side.   6/22-  1730- worsening NIHSS -   Repeat CTH 6/22- - revealed no acute territorial infarct, intracranial hemorrhage, or midline shift, with retained contrast in the intracranial vessels.    Patient taken for mechanical thrombectomy.  6/22- 2100- ICH     ADMISSION SCORES:   NIHSS: 4 -> 21 -> currently 9 post NIH    SEDATION SCORES:  RASS: CAM-ICU:     ICU Vital Signs Last 24 Hrs  T(C): 37.3 (24 Jun 2022 04:00), Max: 37.6 (23 Jun 2022 16:00)  T(F): 99.2 (24 Jun 2022 04:00), Max: 99.7 (23 Jun 2022 16:00)  HR: 92 (24 Jun 2022 06:00) (66 - 92)  BP: 151/54 (24 Jun 2022 06:00) (106/46 - 155/57)  BP(mean): 74 (24 Jun 2022 06:00) (47 - 91)  RR: 19 (24 Jun 2022 06:00) (17 - 26)  SpO2: 96% (24 Jun 2022 06:00) (93% - 99%)- RA         22 Jun 2022 07:01  -  23 Jun 2022 07:00  --------------------------------------------------------  IN:    Norepinephrine: 1011.7 mL    sodium chloride 0.9%: 1150 mL  Total IN: 2161.7 mL    OUT:  Total OUT: 0 mL    Total NET: 2161.7 mL- INC      23 Jun 2022 07:01  -  24 Jun 2022 06:46  --------------------------------------------------------  IN:    IV PiggyBack: 450 mL    Norepinephrine: 45.7 mL    sodium chloride 0.9%: 1800 mL  Total IN: 2295.7 mL    OUT:    Voided (mL): 900 mL  Total OUT: 900 mL    Total NET: 1395.7 mL      MEDICATIONS  (STANDING):  atorvastatin 10 milliGRAM(s) Oral at bedtime  chlorhexidine 4% Liquid 1 Application(s) Topical <User Schedule>  levETIRAcetam  IVPB 500 milliGRAM(s) IV Intermittent every 12 hours  ondansetron Injectable 4 milliGRAM(s) IV Push once  polyethylene glycol 3350 17 Gram(s) Oral every 12 hours  senna 2 Tablet(s) Oral at bedtime  sodium chloride 0.9% Bolus 1000 milliLiter(s) IV Bolus once  sodium chloride 0.9% Bolus 500 milliLiter(s) IV Bolus once  sodium chloride 0.9%. 1000 milliLiter(s) (75 mL/Hr) IV Continuous <Continuous>      06-24    142  |  107  |  10  ----------------------------<  102<H>  3.3<L>   |  21  |  <0.5<L>    Ca    8.1<L>      24 Jun 2022 04:52  Phos  2.6     06-24  Mg     2.2     06-24    TPro  5.8<L>  /  Alb  3.5  /  TBili  0.8  /  DBili  x   /  AST  14  /  ALT  14  /  AlkPhos  46  06-24      141  |  107  |  9<L>  ----------------------------<  104<H>  3.7   |  24  |  <0.5<L>    Ca    8.0<L>      23 Jun 2022 05:02  Phos  2.9     06-23  Mg     1.9     06-23    TPro  5.3<L>  /  Alb  3.7  /  TBili  0.9  /  DBili  x   /  AST  20  /  ALT  21  /  AlkPhos  47  06-23    Na: 141 (06-23 @ 05:02), 139 (06-22 @ 23:33), 135 (06-22 @ 16:45)  K: 3.7 (06-23 @ 05:02), 4.2 (06-22 @ 23:33), 3.5 (06-22 @ 16:45)  Cl: 107 (06-23 @ 05:02), 104 (06-22 @ 23:33), 98 (06-22 @ 16:45)  CO2: 24 (06-23 @ 05:02), 23 (06-22 @ 23:33), 25 (06-22 @ 16:45)  BUN: 9 (06-23 @ 05:02), 11 (06-22 @ 23:33), 12 (06-22 @ 16:45)  Cr: <0.5 (06-23 @ 05:02), 0.5 (06-22 @ 23:33), 0.5 (06-22 @ 16:45)  Glu: 104(06-23 @ 05:02), 131(06-22 @ 23:33), 130(06-22 @ 16:45)                        10.5   10.07 )-----------( 210      ( 24 Jun 2022 04:52 )             30.5       Hgb: 11.1 (06-23 @ 05:02), 11.5 (06-22 @ 23:33), 12.7 (06-22 @ 16:45)  Hct: 32.9 (06-23 @ 05:02), 33.4 (06-22 @ 23:33), 36.9 (06-22 @ 16:45)  WBC: 9.62 (06-23 @ 05:02), 12.06 (06-22 @ 23:33), 11.31 (06-22 @ 16:45)  Plt: 215 (06-23 @ 05:02), 220 (06-22 @ 23:33), 263 (06-22 @ 16:45)    INR: 1.30 06-22-22 @ 16:45  PTT: 28.0 06-22-22 @ 16:45    LIVER FUNCTIONS - ( 23 Jun 2022 05:02 )  Alb: 3.7 g/dL / Pro: 5.3 g/dL / ALK PHOS: 47 U/L / ALT: 21 U/L / AST: 20 U/L / GGT: x          TTE Echo Complete w/o Contrast w/ Doppler (06.23.22 @ 09:29) >  Summary:   1.LV Ejection Fraction by Brody's Method with a biplane EF of 55 %.   2. Spectral Doppler shows pseudonormal pattern of left ventricular   myocardial filling (Grade II diastolic dysfunction).   3. Moderately enlarged left atrium.   4. Normal right atrial size.   5. Mild mitral annular calcification.   6. Mild mitral valve regurgitation.   7. Mild-moderate tricuspid regurgitation.   8. Sclerotic aortic valve with normal opening.   9. Mild pulmonic valve regurgitation.  10. Estimated pulmonary artery systolic pressure is 57.8 mmHg assuming a   right atrial pressure of 3 mmHg, which is consistent with moderate   pulmonary hypertension.           CT Head No Cont (06.23.22 @ 16:39) >  IMPRESSION:    1.  No significant interval change since the CT head performed earlier   the same day, 5:24 AM.    2.  Stable subarachnoid hemorrhage within the left sylvian fissure   extending into the frontal, temporal and parietal sulci. Stable potential   parenchymal component of hemorrhage within the left posterior subinsular   region.    3.  Stable hypoattenuation within the left basal ganglia likely   reflecting acute infarct.    4.  Stable trace layering intraventricular hemorrhage reflecting   redistribution.    VA Duplex Lower Ext Vein Scan, Bilat (06.23.22 @ 14:58) >    INTERPRETATION:  CLINICAL INFORMATION: 81 years old female with leg   swelling    COMPARISON: None available.    TECHNIQUE: Duplex sonography of the BILATERAL LOWER extremity veins with   color and spectral Doppler, with and without compression.    FINDINGS:    RIGHT:  Normal compressibility of the RIGHT common femoral, femoral and popliteal   veins.  Doppler examination shows normal spontaneous and phasic flow.  No RIGHT calf vein thrombosis is detected.    LEFT:  Normal compressibility of the LEFT common femoral, femoral and popliteal   veins.  Doppler examination shows normal spontaneous and phasic flow.  No LEFT calf vein thrombosis is detected.    IMPRESSION:  No evidence of deep venous thrombosis in either lower extremity.    Very difficult exam due to body habitus.      CT Angio Head w/ IV Cont (06.22.22 @ 16:27) >    IMPRESSION:    CT PERFUSION:  Delayed perfusion in the left frontotemporal regions along the MCA   territory measuring 81 mL. No perfusion evidence of core infarct.    CTA HEAD/NECK:  Findings compatible with occlusion of the left ICA extending to the   carotid terminus and proximal left M1 MCA. The gradual loss of vascular   enhancement in the proximal left cervical ICA could reflect   pseudoocclusion. Confirmation with DSA is recommended.              EXAMINATION:  General: No acute distress  HEENT: Anicteric sclerae  Cardiac: [id number]  Lungs: Clear  Abdomen: Soft, non-tender, +BS  Extremities: No c/c/e  Skin/Incision Site: Clean, dry and intact  Neurologic: awake, and alert. Aphasic, right facial,  Intermittently Follows commands, able to track bilaterally. left pupils 4 mm surgical, right pupil 2 mm briskly reactive, eyes midline. LUE 5/5, RUE AG, 4-/5 +drift, does not hit bed. LLE 4/5, RLE AG 3-/5, drift does not hit bed.

## 2022-06-24 NOTE — PHYSICAL THERAPY INITIAL EVALUATION ADULT - GAIT DEVIATIONS NOTED, PT EVAL
R foot drop, R trunk lean, losing balance bilaterally/decreased varun/footdrop/decreased step length/decreased stride length

## 2022-06-24 NOTE — PROGRESS NOTE ADULT - ASSESSMENT
ASSESSMENT: 81-year-old female p/w dysarthria, mild right facial droop, and RUE/RLE weakness. 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 s/p tpa @ 1630, 6/22. No NI intervention 2/2 NIHSS improving from 4 --> 1. After tpa, patient became aphasic, dilated left pupil, dense right-sided hemiplegia, and decreased sensation right lower arm and RLE, however still able to follow commands on left side. Code NI reactivated. Patient taken for mechanical thrombectomy. Left proximal ICA/ M1 occlusion with TICI  0-2C achieved in 2 passes. 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    PLAN:   NEURO:  - Neuro checks Q1  - MRI brain when able   - 24 hours TPA scan today 4:30 pm   - Seizure precautions, Keppra 500 BID x 7 days  - ICU delirium precautions  - PT/OT  Activity: [X] Increase as tolerated [] Bedrest [X] PT [X] OT [] PMNR    PULM:  - HOB > 35 degrees  - Aspiration precautions  - Keep SaO2 > 95%  - s/p extubation post NI procedure     CV:  - Keep SBP < 140, Keep MAP 65-70  - Levophed   - Telemetry monitoring  - 12-lead ECG  - TTE/2D echo  - Lipid profile  - Statin     RENAL:  - Strict I/Os, daily weights  - Keep euvolemic  - Keep normonatremic   - Keep Magnesium level > 2  - Monitor lytes, replete as needed  - NS @ 75cc/hr    GI:  Diet: Dysphagia screen and then advance diet as tolerated  GI prophylaxis [X] not indicated [] PPI [] other:  Bowel regimen [X] Miralax [X] senna [] other:    ENDO:   - Goal euglycemia (-180)  - HgA1c, TSH    HEME/ONC:  VTE prophylaxis: [X] SCDs [] chemoprophylaxis [] hold chemoprophylaxis due to: [] high risk of DVT/PE on admission due to:  - Hold A/C and A/P for now  - VA Duplex B/L LE    ID:  - Keep normothermic, avoid fevers    MISC:  PT/OT/SLP    CODE STATUS:  [X] Full Code [] DNR [] DNI [] Palliative/Comfort Care    DISPOSITION:  [X] NCCU [] Stroke Unit [] Floor [] EMU [] RCU [] PCU     ASSESSMENT: 81-year-old female p/w dysarthria, mild right facial droop, and RUE/RLE weakness. 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 s/p tpa @ 1630, 6/22. No NI intervention 2/2 NIHSS improving from 4 --> 1. After tpa, patient became aphasic, dilated left pupil, dense right-sided hemiplegia, and decreased sensation right lower arm and RLE, however still able to follow commands on left side. Code NI reactivated. Patient taken for mechanical thrombectomy. Left proximal ICA/ M1 occlusion with TICI  0-2C achieved in 2 passes. 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    PLAN:   NEURO:  - Neuro checks Q4  - MRI brain when able   - Seizure precautions, Keppra 500 BID x 7 days  - ICU delirium precautions  - stroke Core measures done   - PT/OT  Activity: [X] Increase as tolerated [] Bedrest [X] PT [X] OT [] PMNR    PULM: Smoker   - Nicotine patch   - HOB > 35 degrees  - Aspiration precautions  - Keep SaO2 > 95%  - s/p extubation post NI procedure     CV:  - Keep SBP < 140,  - 110- 140 - SBP   - Telemetry monitoring  - 12-lead ECG  - TTE/2D echo  - Lipid profile  - Statin     RENAL:  - Strict I/Os, daily weights  - Keep euvolemic  - Keep normonatremic   - Keep Magnesium level > 2  - Monitor lytes, replete as needed  - NS @ 75cc/hr    GI:  Diet: Dysphagia screen and then advance diet as tolerated  GI prophylaxis [X] other: H2 Blocker - on ant acid at home   Bowel regimen [X] Miralax [X] senna [] other:    ENDO:   - Goal euglycemia (-180)  - HgA1c- 5.0, TSH- 0.37  - LDL - 75    HEME/ONC:  VTE prophylaxis: [X] SCDs [X chemoprophylaxis    - VA Duplex B/L LE    ID:  - Keep normothermic, avoid fevers    MISC:  PT/OT/SLP- consult     CODE STATUS:  [X] Full Code [] DNR [] DNI [] Palliative/Comfort Care    DISPOSITION:  [X] NCCU [] Stroke Unit [] Floor [] EMU [] RCU [] PCU     ASSESSMENT: 81-year-old female p/w dysarthria, mild right facial droop, and RUE/RLE weakness. 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 s/p tpa @ 1630, 6/22. No NI intervention 2/2 NIHSS improving from 4 --> 1. After tpa, patient became aphasic, dilated left pupil, dense right-sided hemiplegia, and decreased sensation right lower arm and RLE, however still able to follow commands on left side. Code NI reactivated. Patient taken for mechanical thrombectomy  . Left proximal ICA/ M1 occlusion with TICI  0-2C achieved in 2 passes. 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    PLAN:   NEURO:  - Neuro checks Q4  - MRI brain when able   - Seizure precautions, Keppra 500 BID x 7 days  - ICU delirium precautions  - stroke Core measures done   - PT/OT  Activity: [X] Increase as tolerated [] Bedrest [X] PT [X] OT [] PMNR    PULM: Smoker   - Nicotine patch   - HOB > 35 degrees  - Aspiration precautions  - Keep SaO2 > 95%  - s/p extubation post NI procedure     CV:  - Keep SBP < 140,  - 110- 140 - SBP   - Telemetry monitoring  - 12-lead ECG  - TTE/2D echo  - Lipid profile  - Statin     RENAL:  - Strict I/Os, daily weights  - Keep euvolemic  - Keep normonatremic   - Keep Magnesium level > 2  - Monitor lytes, replete as needed  - NS @ 75cc/hr    GI:  Diet: Dysphagia screen and then advance diet as tolerated  GI prophylaxis [X] other: H2 Blocker - on ant acid at home   Bowel regimen [X] Miralax [X] senna [] other:    ENDO:   - Goal euglycemia (-180)  - HgA1c- 5.0, TSH- 0.37  - LDL - 75    HEME/ONC:  VTE prophylaxis: [X] SCDs [X chemoprophylaxis    - VA Duplex B/L LE    ID:  - Keep normothermic, avoid fevers    MISC:  PT/OT/SLP- consult     CODE STATUS:  [X] Full Code [] DNR [] DNI [] Palliative/Comfort Care    DISPOSITION:  [X] NCCU [] Stroke Unit [] Floor [] EMU [] RCU [] PCU

## 2022-06-24 NOTE — PHYSICAL THERAPY INITIAL EVALUATION ADULT - IMPAIRMENTS CONTRIBUTING IMPAIRED BED MOBILITY, REHAB EVAL
cognition/impaired coordination/decreased flexibility/abnormal muscle tone/decreased ROM/decreased strength

## 2022-06-24 NOTE — PROVIDER CONTACT NOTE (OTHER) - SITUATION
MAP <60
MAP <65
MAP <60
pt transferred from ICU, B/P on admission 148/70
R leg drift
MAP <65
SBP >140
drift on R leg

## 2022-06-24 NOTE — PHYSICAL THERAPY INITIAL EVALUATION ADULT - TRANSFER SAFETY CONCERNS NOTED: BED/CHAIR, REHAB EVAL
decreased balance during turns/decreased safety awareness/losing balance/decreased sequencing ability/stand pivot/decreased step length/decreased weight-shifting ability

## 2022-06-24 NOTE — PHYSICAL THERAPY INITIAL EVALUATION ADULT - IMPAIRED TRANSFERS: BED/CHAIR, REHAB EVAL
R foot drop, R trunk lean/ataxic/impaired balance/cognition/impaired coordination/decreased flexibility/impaired motor control/impaired postural control/decreased ROM/decreased strength

## 2022-06-24 NOTE — CHART NOTE - NSCHARTNOTEFT_GEN_A_CORE
[facility] Transfer Note    Transfer from: NCCU    Transfer to: (  ) Medicine    (  ) Telemetry    (  ) RCU  ( ) SDU                               (  ) Palliative    (x) Stroke Unit    (  ) MICU    (  ) __________________    Accepting Physician: Katalina    Signout given to:     HPI / CCU COURSE: 81-year-old female with PMHx HLD p/w dysarthria, mild right facial droop, and RUE/RLE weakness, LKW 1330, a/w upset stomach, nausea, and decreased PO intake, NIHSS = 4. Stroke code activated. CTH revealed hyperdensity in the left carotid terminus could reflect underlying thrombosis, atherosclerotic disease, or artifact from adjacent bony structures, with no acute territorial infarct, intracranial hemorrhage, or midline shift. 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 The gradual loss of vascular enhancement in the proximal left cervical ICA could reflect pseudoocclusion. CTP revealed delayed perfusion in the left frontotemporal regions along the MCA territory measuring 81 mL. No perfusion evidence of core infarct. Risks versus benefits of tPA discussed with daughter, tPA administered @ 1630 6/22. Code NI actual for Left ICA occlusion, cancelled 2/2 improving NIHSS. Patient examined by NCC team during tPA administration, NIHSS = 1 for notable LUE drift. Admit to NCCU for close neurological monitoring and optimization.     Interval events:  6/22 patient aphasic, dilated left pupil, dense right-sided hemiplegia, and decreased sensation right lower arm and RLE, however still able to follow commands on left side.   6/22- 1730- worsening NIHSS -   Repeat CTH 6/22- - revealed no acute territorial infarct, intracranial hemorrhage, or midline shift, with retained contrast in the intracranial vessels.    Patient taken for mechanical thrombectomy.  6/22- 2100- ICH     ADMISSION SCORES:   NIHSS: 4 -> 21 -> currently 9 post NIH          Vital Signs Last 24 Hrs  T(C): 36.8 (24 Jun 2022 08:00), Max: 37.6 (23 Jun 2022 16:00)  T(F): 98.3 (24 Jun 2022 08:00), Max: 99.7 (23 Jun 2022 16:00)  HR: 86 (24 Jun 2022 12:00) (66 - 106)  BP: 126/46 (24 Jun 2022 12:00) (119/42 - 157/58)  BP(mean): 73 (24 Jun 2022 12:00) (60 - 91)  RR: 19 (24 Jun 2022 12:00) (17 - 26)  SpO2: 98% (24 Jun 2022 12:00) (93% - 99%)    I&O's Summary    23 Jun 2022 07:01  -  24 Jun 2022 07:00  --------------------------------------------------------  IN: 2295.7 mL / OUT: 900 mL / NET: 1395.7 mL    24 Jun 2022 07:01  -  24 Jun 2022 12:20  --------------------------------------------------------  IN: 575 mL / OUT: 750 mL / NET: -175 mL        Physical Exam:       LABS:   CARDIAC MARKERS ( 22 Jun 2022 16:45 )  x     / <0.01 ng/mL / x     / x     / x                                  10.5   10.07 )-----------( 210      ( 24 Jun 2022 04:52 )             30.5       06-24    142  |  107  |  10  ----------------------------<  102<H>  3.3<L>   |  21  |  <0.5<L>    Ca    8.1<L>      24 Jun 2022 04:52  Phos  2.6     06-24  Mg     2.2     06-24    TPro  5.8<L>  /  Alb  3.5  /  TBili  0.8  /  DBili  x   /  AST  14  /  ALT  14  /  AlkPhos  46  06-24      PT/INR - ( 22 Jun 2022 16:45 )   PT: 14.90 sec;   INR: 1.30 ratio         PTT - ( 22 Jun 2022 16:45 )  PTT:28.0 sec          ECG:    Telemetry:    Imaging:      ASSESSMENT & PLAN:           FOR FOLLOW UP:  [ ]   [ ]   [ ]     Thuy Larios NP [facility] Transfer Note    Transfer from: NCCU    Transfer to: (  ) Medicine    (  ) Telemetry    (  ) RCU  ( ) SDU                               (  ) Palliative    (x) Stroke Unit    (  ) MICU    (  ) __________________    Accepting Physician: Katalina    Signout given to:     HPI / CCU COURSE: 81-year-old female with PMHx HLD p/w dysarthria, mild right facial droop, and RUE/RLE weakness, LKW 1330, a/w upset stomach, nausea, and decreased PO intake, NIHSS = 4. Stroke code activated. CTH revealed hyperdensity in the left carotid terminus could reflect underlying thrombosis, atherosclerotic disease, or artifact from adjacent bony structures, with no acute territorial infarct, intracranial hemorrhage, or midline shift. 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 The gradual loss of vascular enhancement in the proximal left cervical ICA could reflect pseudoocclusion. CTP revealed delayed perfusion in the left frontotemporal regions along the MCA territory measuring 81 mL. No perfusion evidence of core infarct. Risks versus benefits of tPA discussed with daughter, tPA administered @ 1630 6/22. Code NI actual for Left ICA occlusion, cancelled 2/2 improving NIHSS. Patient examined by NCC team during tPA administration, NIHSS = 1 for notable LUE drift. Admit to NCCU for close neurological monitoring and optimization.     Interval events:  6/22 patient aphasic, dilated left pupil, dense right-sided hemiplegia, and decreased sensation right lower arm and RLE, however still able to follow commands on left side.   6/22- 1730- worsening NIHSS -   Repeat CTH 6/22- - revealed no acute territorial infarct, intracranial hemorrhage, or midline shift, with retained contrast in the intracranial vessels.    Patient taken for mechanical thrombectomy.  6/22- 2100- ICH     Repeat CTH showed left parietal ICH with SAH, patient extubated following procedure, neurologically improving.     6/23: Repeat CT head stable, patient taken off levophed, hemodynamically stable.    Vital Signs Last 24 Hrs  T(C): 36.8 (24 Jun 2022 08:00), Max: 37.6 (23 Jun 2022 16:00)  T(F): 98.3 (24 Jun 2022 08:00), Max: 99.7 (23 Jun 2022 16:00)  HR: 86 (24 Jun 2022 12:00) (66 - 106)  BP: 126/46 (24 Jun 2022 12:00) (119/42 - 157/58)  BP(mean): 73 (24 Jun 2022 12:00) (60 - 91)  RR: 19 (24 Jun 2022 12:00) (17 - 26)  SpO2: 98% (24 Jun 2022 12:00) (93% - 99%)    I&O's Summary    23 Jun 2022 07:01  -  24 Jun 2022 07:00  --------------------------------------------------------  IN: 2295.7 mL / OUT: 900 mL / NET: 1395.7 mL    24 Jun 2022 07:01  -  24 Jun 2022 12:20  --------------------------------------------------------  IN: 575 mL / OUT: 750 mL / NET: -175 mL        EXAMINATION:  General: No acute distress  HEENT: Anicteric sclerae  Cardiac: [id number]  Lungs: Clear  Abdomen: Soft, non-tender, +BS  Extremities: No c/c/e  Skin/Incision Site: Clean, dry and intact  Neurologic: awake, and alert. Aphasic, right facial,  Intermittently Follows commands, able to track bilaterally. left pupils 4 mm surgical, right pupil 2 mm briskly reactive, eyes midline. LUE 5/5, RUE AG, 4-/5 +drift, does not hit bed. LLE 4/5, RLE AG 3-/5, drift does not hit bed.       LABS:   CARDIAC MARKERS ( 22 Jun 2022 16:45 )  x     / <0.01 ng/mL / x     / x     / x                                  10.5   10.07 )-----------( 210      ( 24 Jun 2022 04:52 )             30.5       06-24    142  |  107  |  10  ----------------------------<  102<H>  3.3<L>   |  21  |  <0.5<L>    Ca    8.1<L>      24 Jun 2022 04:52  Phos  2.6     06-24  Mg     2.2     06-24    TPro  5.8<L>  /  Alb  3.5  /  TBili  0.8  /  DBili  x   /  AST  14  /  ALT  14  /  AlkPhos  46  06-24      PT/INR - ( 22 Jun 2022 16:45 )   PT: 14.90 sec;   INR: 1.30 ratio         PTT - ( 22 Jun 2022 16:45 )  PTT:28.0 sec    TTE Echo Complete w/o Contrast w/ Doppler (06.23.22 @ 09:29) >  Summary:   1.LV Ejection Fraction by Brody's Method with a biplane EF of 55 %.   2. Spectral Doppler shows pseudonormal pattern of left ventricular   myocardial filling (Grade II diastolic dysfunction).   3. Moderately enlarged left atrium.   4. Normal right atrial size.   5. Mild mitral annular calcification.   6. Mild mitral valve regurgitation.   7. Mild-moderate tricuspid regurgitation.   8. Sclerotic aortic valve with normal opening.   9. Mild pulmonic valve regurgitation.  10. Estimated pulmonary artery systolic pressure is 57.8 mmHg assuming a   right atrial pressure of 3 mmHg, which is consistent with moderate   pulmonary hypertension.    Imaging:    CT Head No Cont (06.23.22 @ 16:39) >  IMPRESSION:    1.  No significant interval change since the CT head performed earlier   the same day, 5:24 AM.    2.  Stable subarachnoid hemorrhage within the left sylvian fissure   extending into the frontal, temporal and parietal sulci. Stable potential   parenchymal component of hemorrhage within the left posterior subinsular   region.    3.  Stable hypoattenuation within the left basal ganglia likely   reflecting acute infarct.    4.  Stable trace layering intraventricular hemorrhage reflecting   redistribution.    VA Duplex Lower Ext Vein Scan, Bilat (06.23.22 @ 14:58) >    INTERPRETATION:  CLINICAL INFORMATION: 81 years old female with leg   swelling    COMPARISON: None available.    TECHNIQUE: Duplex sonography of the BILATERAL LOWER extremity veins with   color and spectral Doppler, with and without compression.    FINDINGS:    RIGHT:  Normal compressibility of the RIGHT common femoral, femoral and popliteal   veins.  Doppler examination shows normal spontaneous and phasic flow.  No RIGHT calf vein thrombosis is detected.    LEFT:  Normal compressibility of the LEFT common femoral, femoral and popliteal   veins.  Doppler examination shows normal spontaneous and phasic flow.  No LEFT calf vein thrombosis is detected.    IMPRESSION:  No evidence of deep venous thrombosis in either lower extremity.    Very difficult exam due to body habitus.      CT Angio Head w/ IV Cont (06.22.22 @ 16:27) >    IMPRESSION:    CT PERFUSION:  Delayed perfusion in the left frontotemporal regions along the MCA   territory measuring 81 mL. No perfusion evidence of core infarct.    CTA HEAD/NECK:  Findings compatible with occlusion of the left ICA extending to the   carotid terminus and proximal left M1 MCA. The gradual loss of vascular   enhancement in the proximal left cervical ICA could reflect   pseudoocclusion. Confirmation with DSA is recommended.        ASSESSMENT & PLAN:      81-year-old female p/w dysarthria, mild right facial droop, and RUE/RLE weakness. 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 s/p tpa @ 1630, 6/22. No NI intervention 2/2 NIHSS improving from 4 --> 1. After tpa, patient became aphasic, dilated left pupil, dense right-sided hemiplegia, and decreased sensation right lower arm and RLE, however still able to follow commands on left side. Code NI reactivated. Patient taken for mechanical thrombectomy. Left proximal ICA/ M1 occlusion with TICI  0-2C achieved in 2 passes. 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    #ISCHEMIC STROKE  #SUBARACHNOID HEMMORHAGE    PLAN:   NEURO:  - Neuro checks Q4  - MRI brain when able   - Seizure precautions, Keppra 500 BID x 7 days  - ICU delirium precautions  - stroke Core measures done   - PT/OT  Activity: [X] Increase as tolerated [] Bedrest [X] PT [X] OT [] PMNR    PULM: Smoker   - Nicotine patch   - HOB > 35 degrees  - Aspiration precautions  - Keep SaO2 > 95%  - s/p extubation post NI procedure     CV:  - Keep SBP < 140,  - 110- 140 - SBP   - Telemetry monitoring  - 12-lead ECG  - TTE/2D echo  - Lipid profile  - Statin     RENAL:  - Strict I/Os, daily weights  - Keep euvolemic  - Keep normonatremic   - Keep Magnesium level > 2  - Monitor lytes, replete as needed  - NS @ 75cc/hr    GI:  Diet: Dysphagia screen and then advance diet as tolerated  GI prophylaxis [X] other: H2 Blocker - on ant acid at home   Bowel regimen [X] Miralax [X] senna [] other:    ENDO:   - Goal euglycemia (-180)  - HgA1c- 5.0, TSH- 0.37  - LDL - 75    HEME/ONC:  VTE prophylaxis: [X] SCDs [X chemoprophylaxis    - VA Duplex B/L LE    ID:  - Keep normothermic, avoid fevers    MISC:  PT/OT/SLP- consult     CODE STATUS:            FOR FOLLOW UP:  [ ]   [ ]   [ ]     Thuy Larios NP [facility] Transfer Note    Transfer from: NCCU    Transfer to: (  ) Medicine    (  ) Telemetry    (  ) RCU  ( ) SDU                               (  ) Palliative    (x) Stroke Unit    (  ) MICU    (  ) __________________    Accepting Physician: Katalina    Signout given to:     HPI / CCU COURSE: 81-year-old female with PMHx HLD p/w dysarthria, mild right facial droop, and RUE/RLE weakness, LKW 1330, a/w upset stomach, nausea, and decreased PO intake, NIHSS = 4. Stroke code activated. CTH revealed hyperdensity in the left carotid terminus could reflect underlying thrombosis, atherosclerotic disease, or artifact from adjacent bony structures, with no acute territorial infarct, intracranial hemorrhage, or midline shift. 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 The gradual loss of vascular enhancement in the proximal left cervical ICA could reflect pseudoocclusion. CTP revealed delayed perfusion in the left frontotemporal regions along the MCA territory measuring 81 mL. No perfusion evidence of core infarct. Risks versus benefits of tPA discussed with daughter, tPA administered @ 1630 6/22. Code NI actual for Left ICA occlusion, cancelled 2/2 improving NIHSS. Patient examined by NCC team during tPA administration, NIHSS = 1 for notable LUE drift. Admit to NCCU for close neurological monitoring and optimization.     Interval events:  6/22 patient aphasic, dilated left pupil, dense right-sided hemiplegia, and decreased sensation right lower arm and RLE, however still able to follow commands on left side.   6/22- 1730- worsening NIHSS -   Repeat CTH 6/22- - revealed no acute territorial infarct, intracranial hemorrhage, or midline shift, with retained contrast in the intracranial vessels.    Patient taken for mechanical thrombectomy.  6/22- 2100- ICH     Repeat CTH showed left parietal ICH with SAH, patient extubated following procedure, neurologically improving.     6/23: Repeat CT head stable, patient taken off levophed, hemodynamically stable.    Vital Signs Last 24 Hrs  T(C): 36.8 (24 Jun 2022 08:00), Max: 37.6 (23 Jun 2022 16:00)  T(F): 98.3 (24 Jun 2022 08:00), Max: 99.7 (23 Jun 2022 16:00)  HR: 86 (24 Jun 2022 12:00) (66 - 106)  BP: 126/46 (24 Jun 2022 12:00) (119/42 - 157/58)  BP(mean): 73 (24 Jun 2022 12:00) (60 - 91)  RR: 19 (24 Jun 2022 12:00) (17 - 26)  SpO2: 98% (24 Jun 2022 12:00) (93% - 99%)    I&O's Summary    23 Jun 2022 07:01  -  24 Jun 2022 07:00  --------------------------------------------------------  IN: 2295.7 mL / OUT: 900 mL / NET: 1395.7 mL    24 Jun 2022 07:01  -  24 Jun 2022 12:20  --------------------------------------------------------  IN: 575 mL / OUT: 750 mL / NET: -175 mL        EXAMINATION:  General: No acute distress  HEENT: Anicteric sclerae  Cardiac: [id number]  Lungs: Clear  Abdomen: Soft, non-tender, +BS  Extremities: No c/c/e  Skin/Incision Site: Clean, dry and intact  Neurologic: awake, and alert. Aphasic, right facial,  Intermittently Follows commands, able to track bilaterally. left pupils 4 mm surgical, right pupil 2 mm briskly reactive, eyes midline. LUE 5/5, RUE AG, 4-/5 +drift, does not hit bed. LLE 4/5, RLE AG 3-/5, drift does not hit bed.       LABS:   CARDIAC MARKERS ( 22 Jun 2022 16:45 )  x     / <0.01 ng/mL / x     / x     / x                                  10.5   10.07 )-----------( 210      ( 24 Jun 2022 04:52 )             30.5       06-24    142  |  107  |  10  ----------------------------<  102<H>  3.3<L>   |  21  |  <0.5<L>    Ca    8.1<L>      24 Jun 2022 04:52  Phos  2.6     06-24  Mg     2.2     06-24    TPro  5.8<L>  /  Alb  3.5  /  TBili  0.8  /  DBili  x   /  AST  14  /  ALT  14  /  AlkPhos  46  06-24      PT/INR - ( 22 Jun 2022 16:45 )   PT: 14.90 sec;   INR: 1.30 ratio         PTT - ( 22 Jun 2022 16:45 )  PTT:28.0 sec    TTE Echo Complete w/o Contrast w/ Doppler (06.23.22 @ 09:29) >  Summary:   1.LV Ejection Fraction by Brody's Method with a biplane EF of 55 %.   2. Spectral Doppler shows pseudonormal pattern of left ventricular   myocardial filling (Grade II diastolic dysfunction).   3. Moderately enlarged left atrium.   4. Normal right atrial size.   5. Mild mitral annular calcification.   6. Mild mitral valve regurgitation.   7. Mild-moderate tricuspid regurgitation.   8. Sclerotic aortic valve with normal opening.   9. Mild pulmonic valve regurgitation.  10. Estimated pulmonary artery systolic pressure is 57.8 mmHg assuming a   right atrial pressure of 3 mmHg, which is consistent with moderate   pulmonary hypertension.    Imaging:    CT Head No Cont (06.23.22 @ 16:39) >  IMPRESSION:    1.  No significant interval change since the CT head performed earlier   the same day, 5:24 AM.    2.  Stable subarachnoid hemorrhage within the left sylvian fissure   extending into the frontal, temporal and parietal sulci. Stable potential   parenchymal component of hemorrhage within the left posterior subinsular   region.    3.  Stable hypoattenuation within the left basal ganglia likely   reflecting acute infarct.    4.  Stable trace layering intraventricular hemorrhage reflecting   redistribution.    VA Duplex Lower Ext Vein Scan, Bilat (06.23.22 @ 14:58) >    INTERPRETATION:  CLINICAL INFORMATION: 81 years old female with leg   swelling    COMPARISON: None available.    TECHNIQUE: Duplex sonography of the BILATERAL LOWER extremity veins with   color and spectral Doppler, with and without compression.    FINDINGS:    RIGHT:  Normal compressibility of the RIGHT common femoral, femoral and popliteal   veins.  Doppler examination shows normal spontaneous and phasic flow.  No RIGHT calf vein thrombosis is detected.    LEFT:  Normal compressibility of the LEFT common femoral, femoral and popliteal   veins.  Doppler examination shows normal spontaneous and phasic flow.  No LEFT calf vein thrombosis is detected.    IMPRESSION:  No evidence of deep venous thrombosis in either lower extremity.    Very difficult exam due to body habitus.      CT Angio Head w/ IV Cont (06.22.22 @ 16:27) >    IMPRESSION:    CT PERFUSION:  Delayed perfusion in the left frontotemporal regions along the MCA   territory measuring 81 mL. No perfusion evidence of core infarct.    CTA HEAD/NECK:  Findings compatible with occlusion of the left ICA extending to the   carotid terminus and proximal left M1 MCA. The gradual loss of vascular   enhancement in the proximal left cervical ICA could reflect   pseudoocclusion. Confirmation with DSA is recommended.        ASSESSMENT & PLAN:      81-year-old female p/w dysarthria, mild right facial droop, and RUE/RLE weakness. 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 s/p tpa @ 1630, 6/22. No NI intervention 2/2 NIHSS improving from 4 --> 1. After tpa, patient became aphasic, dilated left pupil, dense right-sided hemiplegia, and decreased sensation right lower arm and RLE, however still able to follow commands on left side. Code NI reactivated. Patient taken for mechanical thrombectomy. Left proximal ICA/ M1 occlusion with TICI  0-2C achieved in 2 passes. 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    #ISCHEMIC STROKE  #SUBARACHNOID HEMORRHAGE  #HLD      #ISCHEMIC STROKE  #SUBARACHNOID HEMORRHAGE  - Neuro checks Q4  - MRI brain when able   -  - 140  - ECHO  - Seizure precautions, Keppra 500 BID x 7 days  - ICU delirium precautions  - stroke Core measures done   - PT/OT    #HLD  Lipid panel complete  Statin    PULM: Smoker   - Nicotine patch   - HOB > 35 degrees  - Aspiration precautions     MISC:  - Diet  - GI prophylaxis [X] other: H2 Blocker - on ant acid at home   - Bowel regimen [X] Miralax [X] senna [] other:  - VTE prophylaxis: [X] SCDs [X chemoprophylaxis  - PT/OT/SLP- consult     CODE STATUS: FULL      FOR FOLLOW UP:  [ ] ECHO  [ ] MRI  [ ] PT/OT      Thuy Larios NP [facility] Transfer Note    Transfer from: NCCU    Transfer to: (  ) Medicine    (  ) Telemetry    (  ) RCU  ( ) SDU                               (  ) Palliative    (x) Stroke Unit    (  ) MICU    (  ) __________________    Accepting Physician: Katalina    Signout given to: ANDERSON Howard    HPI / CCU COURSE: 81-year-old female with PMHx HLD p/w dysarthria, mild right facial droop, and RUE/RLE weakness, LKW 1330, a/w upset stomach, nausea, and decreased PO intake, NIHSS = 4. Stroke code activated. CTH revealed hyperdensity in the left carotid terminus could reflect underlying thrombosis, atherosclerotic disease, or artifact from adjacent bony structures, with no acute territorial infarct, intracranial hemorrhage, or midline shift. 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 The gradual loss of vascular enhancement in the proximal left cervical ICA could reflect pseudoocclusion. CTP revealed delayed perfusion in the left frontotemporal regions along the MCA territory measuring 81 mL. No perfusion evidence of core infarct. Risks versus benefits of tPA discussed with daughter, tPA administered @ 1630 6/22. Code NI actual for Left ICA occlusion, cancelled 2/2 improving NIHSS. Patient examined by NCC team during tPA administration, NIHSS = 1 for notable LUE drift. Admit to NCCU for close neurological monitoring and optimization.     Interval events:  6/22 patient aphasic, dilated left pupil, dense right-sided hemiplegia, and decreased sensation right lower arm and RLE, however still able to follow commands on left side.   6/22- 1730- worsening NIHSS -   Repeat CTH 6/22- - revealed no acute territorial infarct, intracranial hemorrhage, or midline shift, with retained contrast in the intracranial vessels.    Patient taken for mechanical thrombectomy.  6/22- 2100- ICH     Repeat CTH showed left parietal ICH with SAH, patient extubated following procedure, neurologically improving.     6/23: Repeat CT head stable, patient taken off levophed, hemodynamically stable.    Vital Signs Last 24 Hrs  T(C): 36.8 (24 Jun 2022 08:00), Max: 37.6 (23 Jun 2022 16:00)  T(F): 98.3 (24 Jun 2022 08:00), Max: 99.7 (23 Jun 2022 16:00)  HR: 86 (24 Jun 2022 12:00) (66 - 106)  BP: 126/46 (24 Jun 2022 12:00) (119/42 - 157/58)  BP(mean): 73 (24 Jun 2022 12:00) (60 - 91)  RR: 19 (24 Jun 2022 12:00) (17 - 26)  SpO2: 98% (24 Jun 2022 12:00) (93% - 99%)    I&O's Summary    23 Jun 2022 07:01  -  24 Jun 2022 07:00  --------------------------------------------------------  IN: 2295.7 mL / OUT: 900 mL / NET: 1395.7 mL    24 Jun 2022 07:01  -  24 Jun 2022 12:20  --------------------------------------------------------  IN: 575 mL / OUT: 750 mL / NET: -175 mL        EXAMINATION:  General: No acute distress  HEENT: Anicteric sclerae  Cardiac: [id number]  Lungs: Clear  Abdomen: Soft, non-tender, +BS  Extremities: No c/c/e  Skin/Incision Site: Clean, dry and intact  Neurologic: awake, and alert. Aphasic, right facial,  Intermittently Follows commands, able to track bilaterally. left pupils 4 mm surgical, right pupil 2 mm briskly reactive, eyes midline. LUE 5/5, RUE AG, 4-/5 +drift, does not hit bed. LLE 4/5, RLE AG 3-/5, drift does not hit bed.       LABS:   CARDIAC MARKERS ( 22 Jun 2022 16:45 )  x     / <0.01 ng/mL / x     / x     / x                                  10.5   10.07 )-----------( 210      ( 24 Jun 2022 04:52 )             30.5       06-24    142  |  107  |  10  ----------------------------<  102<H>  3.3<L>   |  21  |  <0.5<L>    Ca    8.1<L>      24 Jun 2022 04:52  Phos  2.6     06-24  Mg     2.2     06-24    TPro  5.8<L>  /  Alb  3.5  /  TBili  0.8  /  DBili  x   /  AST  14  /  ALT  14  /  AlkPhos  46  06-24      PT/INR - ( 22 Jun 2022 16:45 )   PT: 14.90 sec;   INR: 1.30 ratio         PTT - ( 22 Jun 2022 16:45 )  PTT:28.0 sec    TTE Echo Complete w/o Contrast w/ Doppler (06.23.22 @ 09:29) >  Summary:   1.LV Ejection Fraction by Brody's Method with a biplane EF of 55 %.   2. Spectral Doppler shows pseudonormal pattern of left ventricular   myocardial filling (Grade II diastolic dysfunction).   3. Moderately enlarged left atrium.   4. Normal right atrial size.   5. Mild mitral annular calcification.   6. Mild mitral valve regurgitation.   7. Mild-moderate tricuspid regurgitation.   8. Sclerotic aortic valve with normal opening.   9. Mild pulmonic valve regurgitation.  10. Estimated pulmonary artery systolic pressure is 57.8 mmHg assuming a   right atrial pressure of 3 mmHg, which is consistent with moderate   pulmonary hypertension.    Imaging:    CT Head No Cont (06.23.22 @ 16:39) >  IMPRESSION:    1.  No significant interval change since the CT head performed earlier   the same day, 5:24 AM.    2.  Stable subarachnoid hemorrhage within the left sylvian fissure   extending into the frontal, temporal and parietal sulci. Stable potential   parenchymal component of hemorrhage within the left posterior subinsular   region.    3.  Stable hypoattenuation within the left basal ganglia likely   reflecting acute infarct.    4.  Stable trace layering intraventricular hemorrhage reflecting   redistribution.    VA Duplex Lower Ext Vein Scan, Bilat (06.23.22 @ 14:58) >    INTERPRETATION:  CLINICAL INFORMATION: 81 years old female with leg   swelling    COMPARISON: None available.    TECHNIQUE: Duplex sonography of the BILATERAL LOWER extremity veins with   color and spectral Doppler, with and without compression.    FINDINGS:    RIGHT:  Normal compressibility of the RIGHT common femoral, femoral and popliteal   veins.  Doppler examination shows normal spontaneous and phasic flow.  No RIGHT calf vein thrombosis is detected.    LEFT:  Normal compressibility of the LEFT common femoral, femoral and popliteal   veins.  Doppler examination shows normal spontaneous and phasic flow.  No LEFT calf vein thrombosis is detected.    IMPRESSION:  No evidence of deep venous thrombosis in either lower extremity.    Very difficult exam due to body habitus.      CT Angio Head w/ IV Cont (06.22.22 @ 16:27) >    IMPRESSION:    CT PERFUSION:  Delayed perfusion in the left frontotemporal regions along the MCA   territory measuring 81 mL. No perfusion evidence of core infarct.    CTA HEAD/NECK:  Findings compatible with occlusion of the left ICA extending to the   carotid terminus and proximal left M1 MCA. The gradual loss of vascular   enhancement in the proximal left cervical ICA could reflect   pseudoocclusion. Confirmation with DSA is recommended.        ASSESSMENT & PLAN:      81-year-old female p/w dysarthria, mild right facial droop, and RUE/RLE weakness. 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 s/p tpa @ 1630, 6/22. No NI intervention 2/2 NIHSS improving from 4 --> 1. After tpa, patient became aphasic, dilated left pupil, dense right-sided hemiplegia, and decreased sensation right lower arm and RLE, however still able to follow commands on left side. Code NI reactivated. Patient taken for mechanical thrombectomy. Left proximal ICA/ M1 occlusion with TICI  0-2C achieved in 2 passes. 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    #ISCHEMIC STROKE  #SUBARACHNOID HEMORRHAGE  #HLD      #ISCHEMIC STROKE  #SUBARACHNOID HEMORRHAGE  - Neuro checks Q4  - MRI brain when able   -  - 140  - ECHO  - Seizure precautions, Keppra 500 BID x 7 days  - ICU delirium precautions  - stroke Core measures done   - PT/OT    #HLD  Lipid panel complete  Statin    PULM: Smoker   - Nicotine patch   - HOB > 35 degrees  - Aspiration precautions     MISC:  - Diet  - GI prophylaxis [X] other: H2 Blocker - on ant acid at home   - Bowel regimen [X] Miralax [X] senna [] other:  - VTE prophylaxis: [X] SCDs [X chemoprophylaxis  - PT/OT/SLP- consult     CODE STATUS: FULL      FOR FOLLOW UP:  [ ] ECHO  [ ] MRI  [ ] PT/OT      Thuy Larios NP [facility] Transfer Note    Transfer from: [facility]    Transfer to: (  ) Medicine    (  ) Telemetry    (  ) RCU  ( ) SDU                               (  ) Palliative    (x) Stroke Unit    (  ) MICU    (  ) __________________    Accepting Physician: Kong     Signout given to: ANDERSON Howard    HPI / CCU COURSE: 81-year-old female with PMHx HLD p/w dysarthria, mild right facial droop, and RUE/RLE weakness, LKW 1330, a/w upset stomach, nausea, and decreased PO intake, NIHSS = 4. Stroke code activated. CTH revealed hyperdensity in the left carotid terminus could reflect underlying thrombosis, atherosclerotic disease, or artifact from adjacent bony structures, with no acute territorial infarct, intracranial hemorrhage, or midline shift. 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 The gradual loss of vascular enhancement in the proximal left cervical ICA could reflect pseudoocclusion. CTP revealed delayed perfusion in the left frontotemporal regions along the MCA territory measuring 81 mL. No perfusion evidence of core infarct. Risks versus benefits of tPA discussed with daughter, tPA administered @ 1630 6/22. Code NI actual for Left ICA occlusion, cancelled 2/2 improving NIHSS. Patient examined by NCC team during tPA administration, NIHSS = 1 for notable LUE drift. Admit to NCCU for close neurological monitoring and optimization.     Interval events:  6/22 patient aphasic, dilated left pupil, dense right-sided hemiplegia, and decreased sensation right lower arm and RLE, however still able to follow commands on left side.   6/22- 1730- worsening NIHSS -   Repeat CTH 6/22- - revealed no acute territorial infarct, intracranial hemorrhage, or midline shift, with retained contrast in the intracranial vessels.    Patient taken for mechanical thrombectomy.  6/22- 2100- ICH     Repeat CTH showed left parietal ICH with SAH, patient extubated following procedure, neurologically improving.     6/23: Repeat CT head stable, patient taken off levophed, hemodynamically stable.    Vital Signs Last 24 Hrs  T(C): 36.8 (24 Jun 2022 08:00), Max: 37.6 (23 Jun 2022 16:00)  T(F): 98.3 (24 Jun 2022 08:00), Max: 99.7 (23 Jun 2022 16:00)  HR: 86 (24 Jun 2022 12:00) (66 - 106)  BP: 126/46 (24 Jun 2022 12:00) (119/42 - 157/58)  BP(mean): 73 (24 Jun 2022 12:00) (60 - 91)  RR: 19 (24 Jun 2022 12:00) (17 - 26)  SpO2: 98% (24 Jun 2022 12:00) (93% - 99%)    I&O's Summary    23 Jun 2022 07:01  -  24 Jun 2022 07:00  --------------------------------------------------------  IN: 2295.7 mL / OUT: 900 mL / NET: 1395.7 mL    24 Jun 2022 07:01  -  24 Jun 2022 12:20  --------------------------------------------------------  IN: 575 mL / OUT: 750 mL / NET: -175 mL        EXAMINATION:  General: No acute distress  HEENT: Anicteric sclerae  Cardiac: [id number]  Lungs: Clear  Abdomen: Soft, non-tender, +BS  Extremities: No c/c/e  Skin/Incision Site: Clean, dry and intact  Neurologic: awake, and alert. Aphasic, right facial,  Intermittently Follows commands, able to track bilaterally. left pupils 4 mm surgical, right pupil 2 mm briskly reactive, eyes midline. LUE 5/5, RUE AG, 4-/5 +drift, does not hit bed. LLE 4/5, RLE AG 3-/5, drift does not hit bed.       LABS:   CARDIAC MARKERS ( 22 Jun 2022 16:45 )  x     / <0.01 ng/mL / x     / x     / x                                  10.5   10.07 )-----------( 210      ( 24 Jun 2022 04:52 )             30.5       06-24    142  |  107  |  10  ----------------------------<  102<H>  3.3<L>   |  21  |  <0.5<L>    Ca    8.1<L>      24 Jun 2022 04:52  Phos  2.6     06-24  Mg     2.2     06-24    TPro  5.8<L>  /  Alb  3.5  /  TBili  0.8  /  DBili  x   /  AST  14  /  ALT  14  /  AlkPhos  46  06-24      PT/INR - ( 22 Jun 2022 16:45 )   PT: 14.90 sec;   INR: 1.30 ratio         PTT - ( 22 Jun 2022 16:45 )  PTT:28.0 sec    TTE Echo Complete w/o Contrast w/ Doppler (06.23.22 @ 09:29) >  Summary:   1.LV Ejection Fraction by Brody's Method with a biplane EF of 55 %.   2. Spectral Doppler shows pseudonormal pattern of left ventricular   myocardial filling (Grade II diastolic dysfunction).   3. Moderately enlarged left atrium.   4. Normal right atrial size.   5. Mild mitral annular calcification.   6. Mild mitral valve regurgitation.   7. Mild-moderate tricuspid regurgitation.   8. Sclerotic aortic valve with normal opening.   9. Mild pulmonic valve regurgitation.  10. Estimated pulmonary artery systolic pressure is 57.8 mmHg assuming a   right atrial pressure of 3 mmHg, which is consistent with moderate   pulmonary hypertension.    Imaging:    CT Head No Cont (06.23.22 @ 16:39) >  IMPRESSION:    1.  No significant interval change since the CT head performed earlier   the same day, 5:24 AM.    2.  Stable subarachnoid hemorrhage within the left sylvian fissure   extending into the frontal, temporal and parietal sulci. Stable potential   parenchymal component of hemorrhage within the left posterior subinsular   region.    3.  Stable hypoattenuation within the left basal ganglia likely   reflecting acute infarct.    4.  Stable trace layering intraventricular hemorrhage reflecting   redistribution.    VA Duplex Lower Ext Vein Scan, Bilat (06.23.22 @ 14:58) >    INTERPRETATION:  CLINICAL INFORMATION: 81 years old female with leg   swelling    COMPARISON: None available.    TECHNIQUE: Duplex sonography of the BILATERAL LOWER extremity veins with   color and spectral Doppler, with and without compression.    FINDINGS:    RIGHT:  Normal compressibility of the RIGHT common femoral, femoral and popliteal   veins.  Doppler examination shows normal spontaneous and phasic flow.  No RIGHT calf vein thrombosis is detected.    LEFT:  Normal compressibility of the LEFT common femoral, femoral and popliteal   veins.  Doppler examination shows normal spontaneous and phasic flow.  No LEFT calf vein thrombosis is detected.    IMPRESSION:  No evidence of deep venous thrombosis in either lower extremity.    Very difficult exam due to body habitus.      CT Angio Head w/ IV Cont (06.22.22 @ 16:27) >    IMPRESSION:    CT PERFUSION:  Delayed perfusion in the left frontotemporal regions along the MCA   territory measuring 81 mL. No perfusion evidence of core infarct.    CTA HEAD/NECK:  Findings compatible with occlusion of the left ICA extending to the   carotid terminus and proximal left M1 MCA. The gradual loss of vascular   enhancement in the proximal left cervical ICA could reflect   pseudoocclusion. Confirmation with DSA is recommended.        ASSESSMENT & PLAN:      81-year-old female p/w dysarthria, mild right facial droop, and RUE/RLE weakness. 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 s/p tpa @ 1630, 6/22. No NI intervention 2/2 NIHSS improving from 4 --> 1. After tpa, patient became aphasic, dilated left pupil, dense right-sided hemiplegia, and decreased sensation right lower arm and RLE, however still able to follow commands on left side. Code NI reactivated. Patient taken for mechanical thrombectomy. Left proximal ICA/ M1 occlusion with TICI  0-2C achieved in 2 passes. 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    #ISCHEMIC STROKE  #SUBARACHNOID HEMORRHAGE  #HLD      #ISCHEMIC STROKE  #SUBARACHNOID HEMORRHAGE  - Neuro checks Q4  - MRI brain when able   -  - 140  - ECHO  - Seizure precautions, Keppra 500 BID x 7 days  - ICU delirium precautions  - stroke Core measures done   - PT/OT    #HLD  Lipid panel complete  Statin    PULM: Smoker   - Nicotine patch   - HOB > 35 degrees  - Aspiration precautions     MISC:  - Diet  - GI prophylaxis [X] other: H2 Blocker - on ant acid at home   - Bowel regimen [X] Miralax [X] senna [] other:  - VTE prophylaxis: [X] SCDs [X chemoprophylaxis  - PT/OT/SLP- consult     CODE STATUS: FULL      FOR FOLLOW UP:  [ ] ECHO  [ ] MRI  [ ] PT/OT      Thuy Larios NP

## 2022-06-25 LAB
ALBUMIN SERPL ELPH-MCNC: 3.6 G/DL — SIGNIFICANT CHANGE UP (ref 3.5–5.2)
ALP SERPL-CCNC: 48 U/L — SIGNIFICANT CHANGE UP (ref 30–115)
ALT FLD-CCNC: 12 U/L — SIGNIFICANT CHANGE UP (ref 0–41)
ANION GAP SERPL CALC-SCNC: 13 MMOL/L — SIGNIFICANT CHANGE UP (ref 7–14)
AST SERPL-CCNC: 14 U/L — SIGNIFICANT CHANGE UP (ref 0–41)
BILIRUB SERPL-MCNC: 1.1 MG/DL — SIGNIFICANT CHANGE UP (ref 0.2–1.2)
BUN SERPL-MCNC: 11 MG/DL — SIGNIFICANT CHANGE UP (ref 10–20)
CALCIUM SERPL-MCNC: 8.3 MG/DL — LOW (ref 8.5–10.1)
CHLORIDE SERPL-SCNC: 103 MMOL/L — SIGNIFICANT CHANGE UP (ref 98–110)
CO2 SERPL-SCNC: 24 MMOL/L — SIGNIFICANT CHANGE UP (ref 17–32)
CREAT SERPL-MCNC: <0.5 MG/DL — LOW (ref 0.7–1.5)
EGFR: 101 ML/MIN/1.73M2 — SIGNIFICANT CHANGE UP
GLUCOSE SERPL-MCNC: 99 MG/DL — SIGNIFICANT CHANGE UP (ref 70–99)
MAGNESIUM SERPL-MCNC: 1.9 MG/DL — SIGNIFICANT CHANGE UP (ref 1.8–2.4)
PHOSPHATE SERPL-MCNC: 2.8 MG/DL — SIGNIFICANT CHANGE UP (ref 2.1–4.9)
POTASSIUM SERPL-MCNC: 3.6 MMOL/L — SIGNIFICANT CHANGE UP (ref 3.5–5)
POTASSIUM SERPL-SCNC: 3.6 MMOL/L — SIGNIFICANT CHANGE UP (ref 3.5–5)
PROT SERPL-MCNC: 6 G/DL — SIGNIFICANT CHANGE UP (ref 6–8)
SODIUM SERPL-SCNC: 140 MMOL/L — SIGNIFICANT CHANGE UP (ref 135–146)

## 2022-06-25 PROCEDURE — 99231 SBSQ HOSP IP/OBS SF/LOW 25: CPT

## 2022-06-25 PROCEDURE — 93010 ELECTROCARDIOGRAM REPORT: CPT

## 2022-06-25 PROCEDURE — 70450 CT HEAD/BRAIN W/O DYE: CPT | Mod: 26

## 2022-06-25 PROCEDURE — 99233 SBSQ HOSP IP/OBS HIGH 50: CPT

## 2022-06-25 RX ORDER — METOPROLOL TARTRATE 50 MG
12.5 TABLET ORAL ONCE
Refills: 0 | Status: COMPLETED | OUTPATIENT
Start: 2022-06-25 | End: 2022-06-25

## 2022-06-25 RX ORDER — ACETAMINOPHEN 500 MG
650 TABLET ORAL EVERY 6 HOURS
Refills: 0 | Status: DISCONTINUED | OUTPATIENT
Start: 2022-06-25 | End: 2022-06-29

## 2022-06-25 RX ADMIN — HEPARIN SODIUM 5000 UNIT(S): 5000 INJECTION INTRAVENOUS; SUBCUTANEOUS at 18:30

## 2022-06-25 RX ADMIN — POLYETHYLENE GLYCOL 3350 17 GRAM(S): 17 POWDER, FOR SOLUTION ORAL at 18:09

## 2022-06-25 RX ADMIN — SENNA PLUS 2 TABLET(S): 8.6 TABLET ORAL at 21:27

## 2022-06-25 RX ADMIN — FAMOTIDINE 20 MILLIGRAM(S): 10 INJECTION INTRAVENOUS at 11:59

## 2022-06-25 RX ADMIN — ATORVASTATIN CALCIUM 10 MILLIGRAM(S): 80 TABLET, FILM COATED ORAL at 21:27

## 2022-06-25 RX ADMIN — LEVETIRACETAM 400 MILLIGRAM(S): 250 TABLET, FILM COATED ORAL at 18:09

## 2022-06-25 RX ADMIN — HEPARIN SODIUM 5000 UNIT(S): 5000 INJECTION INTRAVENOUS; SUBCUTANEOUS at 05:03

## 2022-06-25 RX ADMIN — CHLORHEXIDINE GLUCONATE 1 APPLICATION(S): 213 SOLUTION TOPICAL at 05:03

## 2022-06-25 RX ADMIN — Medication 1 PATCH: at 11:59

## 2022-06-25 RX ADMIN — LEVETIRACETAM 400 MILLIGRAM(S): 250 TABLET, FILM COATED ORAL at 05:03

## 2022-06-25 NOTE — PROGRESS NOTE ADULT - ASSESSMENT
81F presenting with dysarthria, mild right facial droop, and RUE/RLE weakness. 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 s/p tpa @ 1630, 6/22. No NI intervention 2/2 NIHSS improving from 4 --> 1. After tpa, patient became aphasic, dilated left pupil, dense right-sided hemiplegia, and decreased sensation right lower arm and RLE, however still able to follow commands on left side. Code NI reactivated. Patient taken for mechanical thrombectomy. Left proximal ICA/ M1 occlusion with TICI  0-2C achieved in 2 passes. 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    Ischemic Stroke / SAH  - Neuro checks Q4  - MRI brain: Acute left basal ganglia infarct with associated mass effect upon the adjacent left lateral ventricle and slight shift of the third and lateral ventricles to the right of midline. Small acute infarcts the left frontal, left temporal, and left  parietal regions  - [facility]ETT pending  - ILR pending  - F/u repeat CTH for stable hemorrhage  -  - 140  - Seizure precautions, Keppra 500 BID x 7 days  - ICU delirium precautions  - Stroke core measures done   - PT/OT/Rehab  - Atorvastatin 10mg qhs    PULM: Smoker   - Nicotine patch   - HOB > 35 degrees  - Aspiration precautions     Misc:  - Diet  - GI prophylaxis [X] other: H2 Blocker - on ant acid at home   - Bowel regimen [X] Miralax [X] senna [] other:  - VTE prophylaxis: [X] SCDs [X chemoprophylaxis  - PT/OT/SLP- consult

## 2022-06-25 NOTE — PROGRESS NOTE ADULT - SUBJECTIVE AND OBJECTIVE BOX
Neurology Progress Note    Interval History:    No events overnight.    Medications:  acetaminophen     Tablet .. 650 milliGRAM(s) Oral every 6 hours PRN  atorvastatin 10 milliGRAM(s) Oral at bedtime  chlorhexidine 4% Liquid 1 Application(s) Topical <User Schedule>  famotidine    Tablet 20 milliGRAM(s) Oral daily  heparin   Injectable 5000 Unit(s) SubCutaneous every 12 hours  levETIRAcetam  IVPB 500 milliGRAM(s) IV Intermittent every 12 hours  nicotine -  14 mG/24Hr(s) Patch 1 patch Transdermal daily  ondansetron Injectable 4 milliGRAM(s) IV Push once  polyethylene glycol 3350 17 Gram(s) Oral every 12 hours  senna 2 Tablet(s) Oral at bedtime      Vital Signs Last 24 Hrs  T(C): 36.6 (25 Jun 2022 08:03), Max: 37.1 (24 Jun 2022 16:00)  T(F): 97.8 (25 Jun 2022 08:03), Max: 98.7 (24 Jun 2022 16:00)  HR: 90 (25 Jun 2022 12:06) (70 - 90)  BP: 146/62 (25 Jun 2022 12:06) (125/44 - 156/62)  BP(mean): 106 (25 Jun 2022 12:06) (62 - 106)  RR: 18 (25 Jun 2022 12:06) (17 - 26)  SpO2: 98% (25 Jun 2022 12:06) (91% - 98%)    Neurological Examination:  General:  Appearance is consistent with chronologic age.  No abnormal facies.  Gross skin survey within normal limits.    Cognitive/Language:  Awake, alert, and oriented to person and place, but not time and date.  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  Cranial Nerves  - Eyes: Visual acuity intact, Visual fields full.  EOMI w/o nystagmus, skew or reported double vision.  PERRL.  No ptosis/weakness of eyelid closure.    - Face:  Facial sensation normal V1 - 3, no facial asymmetry.    - Ears/Nose/Throat:  Hearing grossly intact b/l to finger rub.  Palate elevates midline.  Tongue and uvula midline.   Motor examination:  Upper Extremities: L 5/5, R 3+/5; Lower extremities: L 5/5, R 3+/5.    Sensory examination:   Decreased sensation to light touch on right side  Cerebellum:   FTN impaired on right side.     Labs:  CBC Full  -  ( 24 Jun 2022 04:52 )  WBC Count : 10.07 K/uL  RBC Count : 3.44 M/uL  Hemoglobin : 10.5 g/dL  Hematocrit : 30.5 %  Platelet Count - Automated : 210 K/uL  Mean Cell Volume : 88.7 fL  Mean Cell Hemoglobin : 30.5 pg  Mean Cell Hemoglobin Concentration : 34.4 g/dL  Auto Neutrophil # : x  Auto Lymphocyte # : x  Auto Monocyte # : x  Auto Eosinophil # : x  Auto Basophil # : x  Auto Neutrophil % : x  Auto Lymphocyte % : x  Auto Monocyte % : x  Auto Eosinophil % : x  Auto Basophil % : x    06-25    140  |  103  |  11  ----------------------------<  99  3.6   |  24  |  <0.5<L>    Ca    8.3<L>      25 Jun 2022 07:33  Phos  2.8     06-25  Mg     1.9     06-25    TPro  6.0  /  Alb  3.6  /  TBili  1.1  /  DBili  x   /  AST  14  /  ALT  12  /  AlkPhos  48  06-25    LIVER FUNCTIONS - ( 25 Jun 2022 07:33 )  Alb: 3.6 g/dL / Pro: 6.0 g/dL / ALK PHOS: 48 U/L / ALT: 12 U/L / AST: 14 U/L / GGT: x

## 2022-06-26 LAB
ALBUMIN SERPL ELPH-MCNC: 3.7 G/DL — SIGNIFICANT CHANGE UP (ref 3.5–5.2)
ALP SERPL-CCNC: 50 U/L — SIGNIFICANT CHANGE UP (ref 30–115)
ALT FLD-CCNC: 12 U/L — SIGNIFICANT CHANGE UP (ref 0–41)
ANION GAP SERPL CALC-SCNC: 14 MMOL/L — SIGNIFICANT CHANGE UP (ref 7–14)
AST SERPL-CCNC: 17 U/L — SIGNIFICANT CHANGE UP (ref 0–41)
BILIRUB SERPL-MCNC: 0.9 MG/DL — SIGNIFICANT CHANGE UP (ref 0.2–1.2)
BUN SERPL-MCNC: 12 MG/DL — SIGNIFICANT CHANGE UP (ref 10–20)
CALCIUM SERPL-MCNC: 8.6 MG/DL — SIGNIFICANT CHANGE UP (ref 8.5–10.1)
CHLORIDE SERPL-SCNC: 100 MMOL/L — SIGNIFICANT CHANGE UP (ref 98–110)
CO2 SERPL-SCNC: 24 MMOL/L — SIGNIFICANT CHANGE UP (ref 17–32)
CREAT SERPL-MCNC: <0.5 MG/DL — LOW (ref 0.7–1.5)
EGFR: 108 ML/MIN/1.73M2 — SIGNIFICANT CHANGE UP
GLUCOSE SERPL-MCNC: 92 MG/DL — SIGNIFICANT CHANGE UP (ref 70–99)
HCT VFR BLD CALC: 31.2 % — LOW (ref 37–47)
HGB BLD-MCNC: 11.2 G/DL — LOW (ref 12–16)
MCHC RBC-ENTMCNC: 30.8 PG — SIGNIFICANT CHANGE UP (ref 27–31)
MCHC RBC-ENTMCNC: 35.9 G/DL — SIGNIFICANT CHANGE UP (ref 32–37)
MCV RBC AUTO: 85.7 FL — SIGNIFICANT CHANGE UP (ref 81–99)
NRBC # BLD: 0 /100 WBCS — SIGNIFICANT CHANGE UP (ref 0–0)
PLATELET # BLD AUTO: 274 K/UL — SIGNIFICANT CHANGE UP (ref 130–400)
POTASSIUM SERPL-MCNC: 3.7 MMOL/L — SIGNIFICANT CHANGE UP (ref 3.5–5)
POTASSIUM SERPL-SCNC: 3.7 MMOL/L — SIGNIFICANT CHANGE UP (ref 3.5–5)
PROT SERPL-MCNC: 6.2 G/DL — SIGNIFICANT CHANGE UP (ref 6–8)
RBC # BLD: 3.64 M/UL — LOW (ref 4.2–5.4)
RBC # FLD: 12.3 % — SIGNIFICANT CHANGE UP (ref 11.5–14.5)
SODIUM SERPL-SCNC: 138 MMOL/L — SIGNIFICANT CHANGE UP (ref 135–146)
WBC # BLD: 5.92 K/UL — SIGNIFICANT CHANGE UP (ref 4.8–10.8)
WBC # FLD AUTO: 5.92 K/UL — SIGNIFICANT CHANGE UP (ref 4.8–10.8)

## 2022-06-26 PROCEDURE — 99233 SBSQ HOSP IP/OBS HIGH 50: CPT

## 2022-06-26 PROCEDURE — 93010 ELECTROCARDIOGRAM REPORT: CPT

## 2022-06-26 PROCEDURE — 99231 SBSQ HOSP IP/OBS SF/LOW 25: CPT

## 2022-06-26 PROCEDURE — 99223 1ST HOSP IP/OBS HIGH 75: CPT

## 2022-06-26 RX ORDER — ASPIRIN/CALCIUM CARB/MAGNESIUM 324 MG
81 TABLET ORAL DAILY
Refills: 0 | Status: DISCONTINUED | OUTPATIENT
Start: 2022-06-26 | End: 2022-06-29

## 2022-06-26 RX ADMIN — POLYETHYLENE GLYCOL 3350 17 GRAM(S): 17 POWDER, FOR SOLUTION ORAL at 05:27

## 2022-06-26 RX ADMIN — Medication 1 PATCH: at 20:08

## 2022-06-26 RX ADMIN — HEPARIN SODIUM 5000 UNIT(S): 5000 INJECTION INTRAVENOUS; SUBCUTANEOUS at 05:26

## 2022-06-26 RX ADMIN — FAMOTIDINE 20 MILLIGRAM(S): 10 INJECTION INTRAVENOUS at 12:14

## 2022-06-26 RX ADMIN — POLYETHYLENE GLYCOL 3350 17 GRAM(S): 17 POWDER, FOR SOLUTION ORAL at 17:50

## 2022-06-26 RX ADMIN — SENNA PLUS 2 TABLET(S): 8.6 TABLET ORAL at 21:13

## 2022-06-26 RX ADMIN — CHLORHEXIDINE GLUCONATE 1 APPLICATION(S): 213 SOLUTION TOPICAL at 05:25

## 2022-06-26 RX ADMIN — LEVETIRACETAM 400 MILLIGRAM(S): 250 TABLET, FILM COATED ORAL at 05:29

## 2022-06-26 RX ADMIN — LEVETIRACETAM 400 MILLIGRAM(S): 250 TABLET, FILM COATED ORAL at 17:50

## 2022-06-26 RX ADMIN — HEPARIN SODIUM 5000 UNIT(S): 5000 INJECTION INTRAVENOUS; SUBCUTANEOUS at 17:50

## 2022-06-26 RX ADMIN — Medication 1 PATCH: at 12:14

## 2022-06-26 RX ADMIN — ATORVASTATIN CALCIUM 10 MILLIGRAM(S): 80 TABLET, FILM COATED ORAL at 21:13

## 2022-06-26 NOTE — PROGRESS NOTE ADULT - SUBJECTIVE AND OBJECTIVE BOX
JULIANO, DIDI  81y, Female  Allergy: No Known Allergies    Hospital Day: 4d    Patient seen and examined earlier today. Resting comfortably, no events overnight.     PMH/PSH:  PAST MEDICAL & SURGICAL HISTORY:  Hyperlipidemia          LAST 24-Hr EVENTS:    VITALS:  T(F): 96.7 (06-26-22 @ 08:28), Max: 99.5 (06-25-22 @ 20:35)  HR: 78 (06-26-22 @ 11:57)  BP: 113/59 (06-26-22 @ 11:57) (111/43 - 159/73)  RR: 18 (06-26-22 @ 11:57)  SpO2: 100% (06-26-22 @ 11:57)        TESTS & MEASUREMENTS:  Weight (Kg):   BMI (kg/m2): 25.1 (06-22)    06-24-22 @ 07:01  -  06-25-22 @ 07:00  --------------------------------------------------------  IN: 1700 mL / OUT: 2150 mL / NET: -450 mL                            11.2   5.92  )-----------( 274      ( 26 Jun 2022 11:33 )             31.2       06-26    138  |  100  |  12  ----------------------------<  92  3.7   |  24  |  <0.5<L>    Ca    8.6      26 Jun 2022 11:33  Phos  2.8     06-25  Mg     1.9     06-25    TPro  6.2  /  Alb  3.7  /  TBili  0.9  /  DBili  x   /  AST  17  /  ALT  12  /  AlkPhos  50  06-26    LIVER FUNCTIONS - ( 26 Jun 2022 11:33 )  Alb: 3.7 g/dL / Pro: 6.2 g/dL / ALK PHOS: 50 U/L / ALT: 12 U/L / AST: 17 U/L / GGT: x                           COVID-19 PCR: NotDetec (06-22-22 @ 16:55)      RADIOLOGY, ECG, & ADDITIONAL TESTS:  12 Lead ECG:   Ventricular Rate 117 BPM    Atrial Rate 234 BPM    QRS Duration 86 ms    Q-T Interval 370 ms    QTC Calculation(Bazett) 516 ms    R Axis 33 degrees    T Axis 4 degrees    Diagnosis Line Atrial flutter with 2:1 A-V conduction  Nonspecific ST abnormality  Abnormal ECG    Confirmed by Bravo Avila ([id number]) on 6/26/2022 11:43:27 AM (06-25-22 @ 19:58)      RECENT DIAGNOSTIC ORDERS:  Comprehensive Metabolic Panel: AM Sched. Collection: 27-Jun-2022 04:30 (06-26-22 @ 08:02)  Complete Blood Count: AM Sched. Collection: 27-Jun-2022 04:30 (06-26-22 @ 08:02)      MEDICATIONS:  MEDICATIONS  (STANDING):  atorvastatin 10 milliGRAM(s) Oral at bedtime  chlorhexidine 4% Liquid 1 Application(s) Topical <User Schedule>  famotidine    Tablet 20 milliGRAM(s) Oral daily  heparin   Injectable 5000 Unit(s) SubCutaneous every 12 hours  levETIRAcetam  IVPB 500 milliGRAM(s) IV Intermittent every 12 hours  nicotine -  14 mG/24Hr(s) Patch 1 patch Transdermal daily  ondansetron Injectable 4 milliGRAM(s) IV Push once  polyethylene glycol 3350 17 Gram(s) Oral every 12 hours  senna 2 Tablet(s) Oral at bedtime    MEDICATIONS  (PRN):  acetaminophen     Tablet .. 650 milliGRAM(s) Oral every 6 hours PRN Mild Pain (1 - 3)      HOME MEDICATIONS:  atorvastatin 10 mg oral tablet (06-22)  latanoprost 0.005% ophthalmic solution (06-22)      PHYSICAL EXAM:  GENERAL: A&O x2,  + aphasia   HNENT: EOMI, PERRLA    NECK: No LAD/swelling  CHEST/LUNG:  CTAB no wheezes/rales/ronchi  HEART: RRR, No murmurs  ABDOMEN: Soft, NT, ND,  Bowel sounds present  EXTREMITIES:  Warm well perfused, no edema          JULIANO DIDI  81y, Female  Allergy: No Known Allergies    Hospital Day: 4d    Patient seen and examined earlier today. Resting comfortably, noted to have tachycardia with Aflutter on EKG last night.     PMH/PSH:  PAST MEDICAL & SURGICAL HISTORY:  Hyperlipidemia          LAST 24-Hr EVENTS:    VITALS:  T(F): 96.7 (06-26-22 @ 08:28), Max: 99.5 (06-25-22 @ 20:35)  HR: 78 (06-26-22 @ 11:57)  BP: 113/59 (06-26-22 @ 11:57) (111/43 - 159/73)  RR: 18 (06-26-22 @ 11:57)  SpO2: 100% (06-26-22 @ 11:57)        TESTS & MEASUREMENTS:  Weight (Kg):   BMI (kg/m2): 25.1 (06-22)    06-24-22 @ 07:01  -  06-25-22 @ 07:00  --------------------------------------------------------  IN: 1700 mL / OUT: 2150 mL / NET: -450 mL                            11.2   5.92  )-----------( 274      ( 26 Jun 2022 11:33 )             31.2       06-26    138  |  100  |  12  ----------------------------<  92  3.7   |  24  |  <0.5<L>    Ca    8.6      26 Jun 2022 11:33  Phos  2.8     06-25  Mg     1.9     06-25    TPro  6.2  /  Alb  3.7  /  TBili  0.9  /  DBili  x   /  AST  17  /  ALT  12  /  AlkPhos  50  06-26    LIVER FUNCTIONS - ( 26 Jun 2022 11:33 )  Alb: 3.7 g/dL / Pro: 6.2 g/dL / ALK PHOS: 50 U/L / ALT: 12 U/L / AST: 17 U/L / GGT: x                           COVID-19 PCR: NotDetec (06-22-22 @ 16:55)      RADIOLOGY, ECG, & ADDITIONAL TESTS:  12 Lead ECG:   Ventricular Rate 117 BPM    Atrial Rate 234 BPM    QRS Duration 86 ms    Q-T Interval 370 ms    QTC Calculation(Bazett) 516 ms    R Axis 33 degrees    T Axis 4 degrees    Diagnosis Line Atrial flutter with 2:1 A-V conduction  Nonspecific ST abnormality  Abnormal ECG    Confirmed by Bravo Avila ([id number]) on 6/26/2022 11:43:27 AM (06-25-22 @ 19:58)      RECENT DIAGNOSTIC ORDERS:  Comprehensive Metabolic Panel: AM Sched. Collection: 27-Jun-2022 04:30 (06-26-22 @ 08:02)  Complete Blood Count: AM Sched. Collection: 27-Jun-2022 04:30 (06-26-22 @ 08:02)      MEDICATIONS:  MEDICATIONS  (STANDING):  atorvastatin 10 milliGRAM(s) Oral at bedtime  chlorhexidine 4% Liquid 1 Application(s) Topical <User Schedule>  famotidine    Tablet 20 milliGRAM(s) Oral daily  heparin   Injectable 5000 Unit(s) SubCutaneous every 12 hours  levETIRAcetam  IVPB 500 milliGRAM(s) IV Intermittent every 12 hours  nicotine -  14 mG/24Hr(s) Patch 1 patch Transdermal daily  ondansetron Injectable 4 milliGRAM(s) IV Push once  polyethylene glycol 3350 17 Gram(s) Oral every 12 hours  senna 2 Tablet(s) Oral at bedtime    MEDICATIONS  (PRN):  acetaminophen     Tablet .. 650 milliGRAM(s) Oral every 6 hours PRN Mild Pain (1 - 3)      HOME MEDICATIONS:  atorvastatin 10 mg oral tablet (06-22)  latanoprost 0.005% ophthalmic solution (06-22)      PHYSICAL EXAM:  GENERAL: A&O x2,  + aphasia   HNENT: EOMI, PERRLA    NECK: No LAD/swelling  CHEST/LUNG:  CTAB no wheezes/rales/ronchi  HEART: RRR, No murmurs  ABDOMEN: Soft, NT, ND,  Bowel sounds present  EXTREMITIES:  Warm well perfused, no edema

## 2022-06-26 NOTE — PROGRESS NOTE ADULT - NS ATTEND AMEND GEN_ALL_CORE FT
I have personally seen and examined this patient.  I have fully participated in the care of this patient.  I have reviewed all pertinent clinical information, including history, physical exam, plan and note.  Noted to have Aflutter. Will hold off on AC for now given the size of stroke and hemorrhage. Start ASA.  I have reviewed all pertinent clinical information and reviewed all relevant imaging and diagnostic studies personally.  Recommendations as above.  Agree with above assessment except as noted.

## 2022-06-26 NOTE — PROGRESS NOTE ADULT - ASSESSMENT
81F presenting with dysarthria, mild right facial droop, and RUE/RLE weakness. 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 s/p tpa @ 1630, 6/22. No NI intervention 2/2 NIHSS improving from 4 --> 1. After tpa, patient became aphasic, dilated left pupil, dense right-sided hemiplegia, and decreased sensation right lower arm and RLE, however still able to follow commands on left side. Code NI reactivated. Patient taken for mechanical thrombectomy. Left proximal ICA/ M1 occlusion with TICI  0-2C achieved in 2 passes. 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    Ischemic Stroke / SAH  - Neuro checks Q4  - MRI brain: Acute left basal ganglia infarct with associated mass effect upon the adjacent left lateral ventricle and slight shift of the third and lateral ventricles to the right of midline. Small acute infarcts the left frontal, left temporal, and left  parietal regions  - [facility]ERETT pending  - ILR pending  - Repeat CTH (6/25): Slightly decreased subarachnoid hemorrhage in the left sylvian fissure and adjacent left temporal and parietal sulci compared to the prior CT. Redemonstrated evolving acute infarct in the left basal ganglia, left frontal lobe, and left temporal lobe with hemorrhagic transformation in the left subinsular region.   - Reconsider restarting ASA  -  - 140  - Seizure precautions, Keppra 500 BID x 7 days  - ICU delirium precautions  - Stroke core measures done   - PT/OT/Rehab  - Atorvastatin 10mg qhs    PULM: Smoker   - Nicotine patch   - HOB > 35 degrees  - Aspiration precautions     Misc:  - Diet  - GI prophylaxis [X] other: H2 Blocker - on ant acid at home   - Bowel regimen [X] Miralax [X] senna [] other:  - VTE prophylaxis: [X] SCDs [X chemoprophylaxis  - PT/OT/SLP- consult    81F presenting with dysarthria, mild right facial droop, and RUE/RLE weakness. 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 s/p tpa @ 1630, 6/22. No NI intervention 2/2 NIHSS improving from 4 --> 1. After tpa, patient became aphasic, dilated left pupil, dense right-sided hemiplegia, and decreased sensation right lower arm and RLE, however still able to follow commands on left side. Code NI reactivated. Patient taken for mechanical thrombectomy. Left proximal ICA/ M1 occlusion with TICI  0-2C achieved in 2 passes. 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    Ischemic Stroke / SAH  - Neuro checks Q4  - MRI brain: Acute left basal ganglia infarct with associated mass effect upon the adjacent left lateral ventricle and slight shift of the third and lateral ventricles to the right of midline. Small acute infarcts the left frontal, left temporal, and left  parietal regions  - ILR cancelled as new onset of aflutter, EP consulted and aware. Recommends AC;however given bleed and size of stroke will hold off AC. Will start ASA today 6/26  -will repeat EKG  - Repeat CTH (6/25): Slightly decreased subarachnoid hemorrhage in the left sylvian fissure and adjacent left temporal and parietal sulci compared to the prior CT. Redemonstrated evolving acute infarct in the left basal ganglia, left frontal lobe, and left temporal lobe with hemorrhagic transformation in the left subinsular region.   -  - 140  - Seizure precautions, Keppra 500 BID x 7 days  - ICU delirium precautions  - Stroke core measures done   - PT/OT/Rehab  - Atorvastatin 10mg qhs    PULM: Smoker   - Nicotine patch   - HOB > 35 degrees  - Aspiration precautions     Misc:  - Diet  - GI prophylaxis [X] other: H2 Blocker - on ant acid at home   - Bowel regimen [X] Miralax [X] senna [] other:  - VTE prophylaxis: [X] SCDs [X chemoprophylaxis  - PT/OT/SLP- consult   Discussed with Neurology attending

## 2022-06-26 NOTE — PROGRESS NOTE ADULT - SUBJECTIVE AND OBJECTIVE BOX
Neurology Progress Note    Interval History:    No events overnight    Medications:  acetaminophen     Tablet .. 650 milliGRAM(s) Oral every 6 hours PRN  atorvastatin 10 milliGRAM(s) Oral at bedtime  chlorhexidine 4% Liquid 1 Application(s) Topical <User Schedule>  famotidine    Tablet 20 milliGRAM(s) Oral daily  heparin   Injectable 5000 Unit(s) SubCutaneous every 12 hours  levETIRAcetam  IVPB 500 milliGRAM(s) IV Intermittent every 12 hours  nicotine -  14 mG/24Hr(s) Patch 1 patch Transdermal daily  ondansetron Injectable 4 milliGRAM(s) IV Push once  polyethylene glycol 3350 17 Gram(s) Oral every 12 hours  senna 2 Tablet(s) Oral at bedtime      Vital Signs Last 24 Hrs  T(C): 36.1 (26 Jun 2022 04:59), Max: 37.5 (25 Jun 2022 20:35)  T(F): 96.9 (26 Jun 2022 04:59), Max: 99.5 (25 Jun 2022 20:35)  HR: 68 (26 Jun 2022 04:59) (68 - 112)  BP: 124/51 (26 Jun 2022 04:59) (111/43 - 159/73)  BP(mean): 74 (26 Jun 2022 04:59) (69 - 106)  RR: 18 (26 Jun 2022 04:59) (18 - 18)  SpO2: 96% (26 Jun 2022 04:59) (96% - 98%)    Neurological Examination:  General:  Appearance is consistent with chronologic age.  No abnormal facies.  Gross skin survey within normal limits.    Cognitive/Language:  Awake, alert, and oriented to person and place, but not time and date.  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  Cranial Nerves  - Eyes: Visual acuity intact, Visual fields full.  EOMI w/o nystagmus, skew or reported double vision.  PERRL.  No ptosis/weakness of eyelid closure.    - Face:  Facial sensation normal V1 - 3, no facial asymmetry.    - Ears/Nose/Throat:  Hearing grossly intact b/l to finger rub.  Palate elevates midline.  Tongue and uvula midline.   Motor examination:  Upper Extremities: L 5/5, R 3+/5; Lower extremities: L 5/5, R 3+/5.    Sensory examination:   Decreased sensation to light touch on right side  Cerebellum:   FTN impaired on right side.     Labs:    06-25    140  |  103  |  11  ----------------------------<  99  3.6   |  24  |  <0.5<L>    Ca    8.3<L>      25 Jun 2022 07:33  Phos  2.8     06-25  Mg     1.9     06-25    TPro  6.0  /  Alb  3.6  /  TBili  1.1  /  DBili  x   /  AST  14  /  ALT  12  /  AlkPhos  48  06-25    LIVER FUNCTIONS - ( 25 Jun 2022 07:33 )  Alb: 3.6 g/dL / Pro: 6.0 g/dL / ALK PHOS: 48 U/L / ALT: 12 U/L / AST: 14 U/L / GGT: x                Neurology Progress Note    Interval History:    No events overnight    Medications:  acetaminophen     Tablet .. 650 milliGRAM(s) Oral every 6 hours PRN  atorvastatin 10 milliGRAM(s) Oral at bedtime  chlorhexidine 4% Liquid 1 Application(s) Topical <User Schedule>  famotidine    Tablet 20 milliGRAM(s) Oral daily  heparin   Injectable 5000 Unit(s) SubCutaneous every 12 hours  levETIRAcetam  IVPB 500 milliGRAM(s) IV Intermittent every 12 hours  nicotine -  14 mG/24Hr(s) Patch 1 patch Transdermal daily  ondansetron Injectable 4 milliGRAM(s) IV Push once  polyethylene glycol 3350 17 Gram(s) Oral every 12 hours  senna 2 Tablet(s) Oral at bedtime      Vital Signs Last 24 Hrs  T(C): 36.1 (2022 04:59), Max: 37.5 (2022 20:35)  T(F): 96.9 (2022 04:59), Max: 99.5 (2022 20:35)  HR: 68 (2022 04:59) (68 - 112)  BP: 124/51 (2022 04:59) (111/43 - 159/73)  BP(mean): 74 (2022 04:59) (69 - 106)  RR: 18 (2022 04:59) (18 - 18)  SpO2: 96% (2022 04:59) (96% - 98%)    Neurological Examination:  General:  Appearance is consistent with chronologic age.  No abnormal facies.  Gross skin survey within normal limits.    Cognitive/Language:  Awake, alert, and oriented to person, , and month  Mixed aphasia (expressive > receptive).  Cranial Nerves  - Eyes: Visual acuity intact, Visual fields full.  EOMI w/o nystagmus  - Face:  Facial sensation normal, no facial asymmetry.    - Ears/Nose/Throat:  Hearing grossly intact   Tongue midline.   Motor examination:  Upper Extremities: L 5/5, R 3+/5; Lower extremities: L 5/5, R 4+/5.    Sensory examination:   Sensation intact b/l  Cerebellum:   FTN impaired on right side.     Labs:        140  |  103  |  11  ----------------------------<  99  3.6   |  24  |  <0.5<L>    Ca    8.3<L>      2022 07:33  Phos  2.8     -  Mg     1.9     -    TPro  6.0  /  Alb  3.6  /  TBili  1.1  /  DBili  x   /  AST  14  /  ALT  12  /  AlkPhos  48      LIVER FUNCTIONS - ( 2022 07:33 )  Alb: 3.6 g/dL / Pro: 6.0 g/dL / ALK PHOS: 48 U/L / ALT: 12 U/L / AST: 14 U/L / GGT: x

## 2022-06-26 NOTE — CONSULT NOTE ADULT - SUBJECTIVE AND OBJECTIVE BOX
DASH HENAOISAR  81y, Female  Allergy: No Known Allergies    Hospital Day: 3d      HPI / CCU COURSE: 81-year-old female with PMHx HLD p/w dysarthria, mild right facial droop, and RUE/RLE weakness, LKW 1330, a/w upset stomach, nausea, and decreased PO intake, NIHSS = 4. Stroke code activated. CTH revealed hyperdensity in the left carotid terminus could reflect underlying thrombosis, atherosclerotic disease, or artifact from adjacent bony structures, with no acute territorial infarct, intracranial hemorrhage, or midline shift. 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 The gradual loss of vascular enhancement in the proximal left cervical ICA could reflect pseudoocclusion. CTP revealed delayed perfusion in the left frontotemporal regions along the MCA territory measuring 81 mL. No perfusion evidence of core infarct. Risks versus benefits of tPA discussed with daughter, tPA administered @ 1630 6/22. Code NI actual for Left ICA occlusion, cancelled 2/2 improving NIHSS. Patient examined by NCC team during tPA administration, NIHSS = 1 for notable LUE drift. Admit to NCCU for close neurological monitoring and optimization.     Interval events:  6/22 patient aphasic, dilated left pupil, dense right-sided hemiplegia, and decreased sensation right lower arm and RLE, however still able to follow commands on left side.   6/22-  1730- worsening NIHSS -   Repeat CTH 6/22- - revealed no acute territorial infarct, intracranial hemorrhage, or midline shift, with retained contrast in the intracranial vessels.    Patient taken for mechanical thrombectomy.  6/22- 2100- ICH     Repeat CTH showed left parietal ICH with SAH, patient extubated following procedure, neurologically improving.     6/23: Repeat CT head stable, patient taken off levophed, hemodynamically stable.    Subjective:  Patient seen and examined earlier today. No complaints today, no events overnight.     PMH/PSH:  PAST MEDICAL & SURGICAL HISTORY:  Hyperlipidemia          LAST 24-Hr EVENTS:    VITALS:  T(F): 97.8 (06-25-22 @ 08:03), Max: 98.7 (06-24-22 @ 23:16)  HR: 68 (06-25-22 @ 16:42)  BP: 139/56 (06-25-22 @ 16:42) (134/63 - 156/62)  RR: 18 (06-25-22 @ 16:42)  SpO2: 97% (06-25-22 @ 16:42)        TESTS & MEASUREMENTS:  Weight (Kg):   BMI (kg/m2): 25.1 (06-22)    06-23-22 @ 07:01  -  06-24-22 @ 07:00  --------------------------------------------------------  IN: 2295.7 mL / OUT: 900 mL / NET: 1395.7 mL    06-24-22 @ 07:01  -  06-25-22 @ 07:00  --------------------------------------------------------  IN: 1700 mL / OUT: 2150 mL / NET: -450 mL                            10.5   10.07 )-----------( 210      ( 24 Jun 2022 04:52 )             30.5       06-25    140  |  103  |  11  ----------------------------<  99  3.6   |  24  |  <0.5<L>    Ca    8.3<L>      25 Jun 2022 07:33  Phos  2.8     06-25  Mg     1.9     06-25    TPro  6.0  /  Alb  3.6  /  TBili  1.1  /  DBili  x   /  AST  14  /  ALT  12  /  AlkPhos  48  06-25    LIVER FUNCTIONS - ( 25 Jun 2022 07:33 )  Alb: 3.6 g/dL / Pro: 6.0 g/dL / ALK PHOS: 48 U/L / ALT: 12 U/L / AST: 14 U/L / GGT: x                           COVID-19 PCR: NotDetec (06-22-22 @ 16:55)      RADIOLOGY, ECG, & ADDITIONAL TESTS:      RECENT DIAGNOSTIC ORDERS:      MEDICATIONS:  MEDICATIONS  (STANDING):  atorvastatin 10 milliGRAM(s) Oral at bedtime  chlorhexidine 4% Liquid 1 Application(s) Topical <User Schedule>  famotidine    Tablet 20 milliGRAM(s) Oral daily  heparin   Injectable 5000 Unit(s) SubCutaneous every 12 hours  levETIRAcetam  IVPB 500 milliGRAM(s) IV Intermittent every 12 hours  nicotine -  14 mG/24Hr(s) Patch 1 patch Transdermal daily  ondansetron Injectable 4 milliGRAM(s) IV Push once  polyethylene glycol 3350 17 Gram(s) Oral every 12 hours  senna 2 Tablet(s) Oral at bedtime    MEDICATIONS  (PRN):  acetaminophen     Tablet .. 650 milliGRAM(s) Oral every 6 hours PRN Mild Pain (1 - 3)      HOME MEDICATIONS:  atorvastatin 10 mg oral tablet (06-22)  latanoprost 0.005% ophthalmic solution (06-22)      PHYSICAL EXAM:  GENERAL: A&O x2,  + aphasia   HNENT: EOMI, PERRLA    NECK: No LAD/swelling  CHEST/LUNG:  CTAB no wheezes/rales/ronchi  HEART: RRR, No murmurs  ABDOMEN: Soft, NT, ND,  Bowel sounds present  EXTREMITIES:  Warm well perfused, no edema       
HPI:  81-year-old female with PMHx HLD p/w dysarthria, mild right facial droop, and RUE/RLE weakness, LKW 1330, a/w upset stomach, nausea, and decreased PO intake, NIHSS = 4. Stroke code activated. CTH revealed hyperdensity in the left carotid terminus could reflect underlying thrombosis, atherosclerotic disease, or artifact from adjacent bony structures, with no acute territorial infarct, intracranial hemorrhage, or midline shift. 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 The gradual loss of vascular enhancement in the proximal left cervical ICA could reflect pseudoocclusion. CTP revealed delayed perfusion in the left frontotemporal regions along the MCA territory measuring 81 mL. No perfusion evidence of core infarct. Risks versus benefits of tPA discussed with daughter, tPA administered @ 1630. Code NI actual for Left ICA occlusion, cancelled 2/2 improving NIHSS. Patient examined by NCC team during tPA administration, NIHSS = 1 for notable LUE drift. Admit to NCCU for close neurological monitoring and optimization.     Interval events: At 1700, patient aphasic, dilated left pupil, dense right-sided hemiplegia, and decreased sensation right lower arm and RLE, however still able to follow commands on left side. Repeat CTH revealed no acute territorial infarct, intracranial hemorrhage, or midline shift, with retained contrast in the intracranial vessels. Code NI reactivated. Patient taken for mechanical thrombectomy.    (22 Jun 2022 17:32)      PAST MEDICAL & SURGICAL HISTORY:  Hyperlipidemia          Hospital Course:  She is s/p TPA.  She is S/p thrombectomy. Some improvement after the thrombectomy.   She amb 25 ft RW mod assist with PT.  TODAY'S SUBJECTIVE & REVIEW OF SYMPTOMS:     Constitutional WNL   Cardio WNL   Resp WNL   GI WNL  Heme WNL  Endo WNL  Skin WNL  MSK WNL  Neuro WNL  Cognitive WNL  Psych WNL      MEDICATIONS  (STANDING):  atorvastatin 10 milliGRAM(s) Oral at bedtime  calcium gluconate IVPB 1 Gram(s) IV Intermittent once  chlorhexidine 4% Liquid 1 Application(s) Topical <User Schedule>  famotidine    Tablet 20 milliGRAM(s) Oral daily  heparin   Injectable 5000 Unit(s) SubCutaneous every 12 hours  levETIRAcetam  IVPB 500 milliGRAM(s) IV Intermittent every 12 hours  nicotine -  14 mG/24Hr(s) Patch 1 patch Transdermal daily  ondansetron Injectable 4 milliGRAM(s) IV Push once  polyethylene glycol 3350 17 Gram(s) Oral every 12 hours  senna 2 Tablet(s) Oral at bedtime  sodium chloride 0.9%. 1000 milliLiter(s) (75 mL/Hr) IV Continuous <Continuous>    MEDICATIONS  (PRN):      FAMILY HISTORY:      Allergies    No Known Allergies    Intolerances        SOCIAL HISTORY:    [  ] Etoh  [  ] Smoking  [  ] Substance abuse     Home Environment:  [  ] Home Alone  [x  ] Lives with Family-  [  ] Home Health Aid    Dwelling:  [  ] Apartment  [ x ] Private House  [  ] Adult Home  [  ] Skilled Nursing Facility      [  ] Short Term  [  ] Long Term  [x  ] [facility] and [place]       [place] [  ]    FUNCTIONAL STATUS PTA: (Check all that apply)  Ambulation: [ x  ]Independent    [  ] Dependent     [  ] Non-Ambulatory  Assistive Device: [  ] SA Cane  [  ]  Q Cane  [  ] Walker  [  ]  Wheelchair  ADL : [ x ] Independent  [  ]  Dependent       Vital Signs Last 24 Hrs  T(C): 37 (24 Jun 2022 12:00), Max: 37.6 (23 Jun 2022 16:00)  T(F): 98.6 (24 Jun 2022 12:00), Max: 99.7 (23 Jun 2022 16:00)  HR: 74 (24 Jun 2022 15:00) (66 - 106)  BP: 125/44 (24 Jun 2022 15:00) (116/42 - 157/58)  BP(mean): 62 (24 Jun 2022 15:00) (62 - 91)  RR: 23 (24 Jun 2022 15:00) (18 - 26)  SpO2: 97% (24 Jun 2022 15:00) (93% - 99%)      PHYSICAL EXAM: Alert & Oriented X 0  GENERAL: NAD, well-groomed, well-developed  HEAD:  Atraumatic, Normocephalic  EYES: EOMI, PERRLA, conjunctiva and sclera clear  NECK: Supple, No JVD, Normal thyroid  CHEST/LUNG: Clear bilaterally; No rales, rhonchi, wheezing, or rubs  HEART: Regular rate and rhythm; No murmurs, rubs, or gallops  ABDOMEN: Soft, Nontender, Nondistended; Bowel sounds present  EXTREMITIES:  2+ Peripheral Pulses, No clubbing, cyanosis, or edema    NERVOUS SYSTEM:  Cranial Nerves 2-12 intact [x  ] Abnormal  [  ]  paucity of speech  she doesn't repeat "No, ifs ands or buts."  ROM: WFL all extremities [  ]  Abnormal [  ]  Motor Strength: WFL all extremities  [  ]  Abnormal [ x ] right hemiparesis RUE 2/5; RLE 2-3/5  Sensation: intact to light touch [x  ] Abnormal [  ]  Reflexes: Symmetric [  ]  Abnormal [  ]    FUNCTIONAL STATUS:  Bed Mobility: Independent [  ]  Supervision [  ]  Needs Assistance [  ]  N/A [  ]  Transfers: Independent [  ]  Supervision [  ]  Needs Assistance [  ]  N/A [  ]   Ambulation: Independent [  ]  Supervision [  ]  Needs Assistance [  ]  N/A [  ]  ADL: Independent [  ] Requires Assistance [  ] N/A [  ]      LABS:                        10.5   10.07 )-----------( 210      ( 24 Jun 2022 04:52 )             30.5     06-24    142  |  107  |  10  ----------------------------<  102<H>  3.3<L>   |  21  |  <0.5<L>    Ca    8.1<L>      24 Jun 2022 04:52  Phos  2.6     06-24  Mg     2.2     06-24    TPro  5.8<L>  /  Alb  3.5  /  TBili  0.8  /  DBili  x   /  AST  14  /  ALT  14  /  AlkPhos  46  06-24    PT/INR - ( 22 Jun 2022 16:45 )   PT: 14.90 sec;   INR: 1.30 ratio         PTT - ( 22 Jun 2022 16:45 )  PTT:28.0 sec      RADIOLOGY & ADDITIONAL STUDIES:    Assesment:
Patient is a 81y old  Female who presents with a chief complaint of CVA (2022 05:29)        HPI:  81-year-old female with PMHx HLD p/w dysarthria, mild right facial droop, and RUE/RLE weakness, LKW 1330, a/w upset stomach, nausea, and decreased PO intake, NIHSS = 4. Stroke code activated. CTH revealed hyperdensity in the left carotid terminus could reflect underlying thrombosis, atherosclerotic disease, or artifact from adjacent bony structures, with no acute territorial infarct, intracranial hemorrhage, or midline shift. 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 The gradual loss of vascular enhancement in the proximal left cervical ICA could reflect pseudoocclusion. CTP revealed delayed perfusion in the left frontotemporal regions along the MCA territory measuring 81 mL. No perfusion evidence of core infarct. Risks versus benefits of tPA discussed with daughter, tPA administered @ 1630. Code NI actual for Left ICA occlusion, cancelled 2/2 improving NIHSS. Patient examined by NCC team during tPA administration, NIHSS = 1 for notable LUE drift. Admit to NCCU for close neurological monitoring and optimization.     Interval events: At 1700, patient aphasic, dilated left pupil, dense right-sided hemiplegia, and decreased sensation right lower arm and RLE, however still able to follow commands on left side. Repeat CTH revealed no acute territorial infarct, intracranial hemorrhage, or midline shift, with retained contrast in the intracranial vessels. Code NI reactivated. Patient taken for mechanical thrombectomy.    (2022 17:32)      Electrophysiology:  Hx and symptoms obtained from daughter at bedside  81y Female with h/o HLD, presented with slurred speech found to have acute occlusion of LICA, s/p mechanical thrombectomy. The rest of work up unremarkable EVERETT pending  EP consulted for long term cardiac monitoring to monitor for etiology of cryptogenic stroke.  Patient denies palpitations, dyspnea. admits to occasional dizziness due to known h/o vertigo, precipitated by neck turns.       REVIEW OF SYSTEMS    [x ] A ten-point review of systems was otherwise negative except as noted.  [ ] Due to altered mental status/intubation, subjective information were not able to be obtained from the patient. History was obtained, to the extent possible, from review of the chart and collateral sources of information.      PAST MEDICAL & SURGICAL HISTORY:  Hyperlipidemia          Home Medications:  atorvastatin 10 mg oral tablet: 1 tab(s) orally once a day (2022 17:48)  latanoprost 0.005% ophthalmic solution: 1 drop(s) to each affected eye once a day (in the evening); left eye (2022 17:48)      Allergies:  No Known Allergies      FAMILY HISTORY: not contributory       SOCIAL HISTORY: denies tobacco / ETOH / illicit drug use     CIGARETTES:  ALCOHOL:  MARIJUANA:  ILICIT DRUGS:        PREVIOUS DIAGNOSTIC TESTING:      ECHO  FINDINGS:  < from: TTE Echo Complete w/o Contrast w/ Doppler (22 @ 09:29) >  Summary:   1.LV Ejection Fraction by Brody's Method with a biplane EF of 55 %.   2. Spectral Doppler shows pseudonormal pattern of left ventricular   myocardial filling (Grade II diastolic dysfunction).   3. Moderately enlarged left atrium.   4. Normal right atrial size.   5. Mild mitral annular calcification.   6. Mild mitral valve regurgitation.   7. Mild-moderate tricuspid regurgitation.   8. Sclerotic aortic valve with normal opening.   9. Mild pulmonic valve regurgitation.  10. Estimated pulmonary artery systolic pressure is 57.8 mmHg assuming a   right atrial pressure of 3 mmHg, which is consistent with moderate   pulmonary hypertension.    < end of copied text >    VENOUS DUPLEX SCAN:  FINDINGS:  < from: VA Duplex Lower Ext Vein Scan, Bilat (22 @ 14:58) >  IMPRESSION:  No evidence of deep venous thrombosis in either lower extremity.    Very difficult exam due to body habitus.    < end of copied text >          MEDICATIONS  (STANDING):  atorvastatin 10 milliGRAM(s) Oral at bedtime  chlorhexidine 4% Liquid 1 Application(s) Topical <User Schedule>  famotidine    Tablet 20 milliGRAM(s) Oral daily  heparin   Injectable 5000 Unit(s) SubCutaneous every 12 hours  levETIRAcetam  IVPB 500 milliGRAM(s) IV Intermittent every 12 hours  nicotine -  14 mG/24Hr(s) Patch 1 patch Transdermal daily  ondansetron Injectable 4 milliGRAM(s) IV Push once  polyethylene glycol 3350 17 Gram(s) Oral every 12 hours  senna 2 Tablet(s) Oral at bedtime    MEDICATIONS  (PRN):  acetaminophen     Tablet .. 650 milliGRAM(s) Oral every 6 hours PRN Mild Pain (1 - 3)      Vital Signs Last 24 Hrs  T(C): 35.9 (2022 08:28), Max: 37.5 (2022 20:35)  T(F): 96.7 (2022 08:28), Max: 99.5 (2022 20:35)  HR: 78 (2022 11:57) (68 - 112)  BP: 113/59 (2022 11:57) (111/43 - 159/73)  BP(mean): 74 (2022 11:57) (69 - 103)  RR: 18 (2022 11:57) (18 - 18)  SpO2: 100% (2022 11:57) (96% - 100%)    PHYSICAL EXAM:    GENERAL: In no apparent distress, well nourished, and hydrated.  HEAD:  Atraumatic, Normocephalic  EYES: EOMI, PERRLA, conjunctiva and sclera clear  NECK: Supple and normal thyroid.  No JVD or carotid bruit.  Carotid pulse is 2+ bilaterally.  HEART: Regular rate and rhythm; No murmurs, rubs, or gallops.  PULMONARY: Clear to auscultation and perfusion.  No rales, wheezing, or rhonchi bilaterally.  ABDOMEN: Soft, Nontender, Nondistended; Bowel sounds present  EXTREMITIES:  2+ Peripheral Pulses, No clubbing, cyanosis, or edema  NEUROLOGICAL: Grossly nonfocal    I&O's Detail    Daily     Daily     INTERPRETATION OF TELEMETRY:    EC Lead ECG:   Ventricular Rate 117 BPM    Atrial Rate 234 BPM    QRS Duration 86 ms    Q-T Interval 370 ms    QTC Calculation(Bazett) 516 ms    R Axis 33 degrees    T Axis 4 degrees    Diagnosis Line Atrial flutter with 2:1 A-V conduction  Nonspecific ST abnormality  Abnormal ECG    Confirmed by Bravo Avila ([id number]) on 2022 11:43:27 AM (22 @ 19:58)      < from: 12 Lead ECG (22 @ 16:33) >  Ventricular Rate 93 BPM    Atrial Rate 93 BPM    P-R Interval 146 ms    QRS Duration 94 ms    Q-T Interval 396 ms    QTC Calculation(Bazett) 492 ms    P Axis 76 degrees    R Axis 34 degrees    T Axis 60 degrees    Diagnosis Line Sinus rhythm with Premature supraventricular complexes  Prolonged QT  Abnormal ECG    < end of copied text >    LABS:                        11.2   5.92  )-----------( 274      ( 2022 11:33 )             31.2         138  |  100  |  12  ----------------------------<  92  3.7   |  24  |  <0.5<L>    Ca    8.6      2022 11:33  Phos  2.8       Mg     1.9         TPro  6.2  /  Alb  3.7  /  TBili  0.9  /  DBili  x   /  AST  17  /  ALT  12  /  AlkPhos  50        Thyroid Stimulating Hormone, Serum: 0.37 uIU/mL (22 @ 05:02)          BNP            RADIOLOGY & ADDITIONAL STUDIES:    < from: CT Perfusion w/ Maps w/ IV Cont (22 @ 16:16) >  IMPRESSION:    CT PERFUSION:  Delayed perfusion in the left frontotemporal regions along the MCA   territory measuring 81 mL. No perfusion evidence of core infarct.    CTA HEAD/NECK:  Findings compatible with occlusion of the left ICA extending to the   carotid terminus and proximal left M1 MCA. The gradual loss of vascular   enhancement in the proximal left cervical ICA could reflect   pseudoocclusion. Confirmation with DSA is recommended    < end of copied text >      < from: IR Neuro (22 @ 20:17) >  - Near-complete recanalization and distal reperfusion of occluded ICA and   MCA status post mechanical thrombectomy. (TICI 0 to 2b).    < end of copied text >      < from: MR Head No Cont (22 @ 18:38) >  IMPRESSION:    1.  Cerebral atrophy.    2.  Acute left basal ganglia infarct with associated mass effect upon the   adjacent left lateral ventricle and slightshift of the third and lateral   ventricles to the right of midline.    3.  Small acute infarcts the left frontal, left temporal, and left   parietal regions    4.  Subarachnoid and parenchymal hemorrhage in the left temporal lobe,   subarachnoid hemorrhage in the left parietal lobe, and intraventricular   hemorrhage.    < end of copied text >

## 2022-06-26 NOTE — CONSULT NOTE ADULT - ASSESSMENT
81F presenting with dysarthria, mild right facial droop, and RUE/RLE weakness. 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 s/p tpa @ 1630, 6/22. No NI intervention 2/2 NIHSS improving from 4 --> 1. After tpa, patient became aphasic, dilated left pupil, dense right-sided hemiplegia, and decreased sensation right lower arm and RLE, however still able to follow commands on left side. Code NI reactivated. Patient taken for mechanical thrombectomy. 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 Patient extubated post procedure, noted to be improving neurologically. Repeat CTH stable, and pt taken off levophed.     #L MCA Stroke s/p TPA and Thrombectomy c/b Acute SAH and Hemorrhagic Conversion   Neuro reccs:   - Neuro checks Q4  - MRI brain: Acute left basal ganglia infarct with associated mass effect upon the adjacent left lateral ventricle and slight shift of the third and lateral ventricles to the right of midline. Small acute infarcts the left frontal, left temporal, and left  parietal regions  - EVERETT pending  - ILR pending  - F/u repeat CTH for stable hemorrhage  -  - 140  - Seizure precautions, Keppra 500 BID x 7 days  - ICU delirium precautions  - Stroke core measures done   - PT/OT/Rehab  - Atorvastatin 10mg qhs    #Hx of Tobacco Use   - Nicotine patch     #HLD   - c/w atorvastatin      DVT ppx: currently on heparin SQ, AC as per neuro given SAH     Total time spent to complete patient's bedside assessment, review medical chart, discuss medical plan of care with covering medical team was more than 35 minutes  with >50% of time spent face to face with patient, discussion with patient/family and/or coordination of care    Gisell Melgar DO         
IMPRESSION: Rehab of CVA, right hemiparesis, aphasia, s/p TPA, s/p thrombectomy    PRECAUTIONS: [x  ] Cardiac  [  ] Respiratory  [  ] Seizures [  ] Contact Isolation  [  ] Droplet Isolation  [  ] Other    Weight Bearing Status:     RECOMMENDATION:    Out of Bed to Chair     DVT/Decubiti Prophylaxis    REHAB PLAN:     [ x  ] Bedside P/T 3-5 times a week   [  x ]   Bedside O/T  2-3 times a week             [   ] No Rehab Therapy Indicated                   [  x ]  Speech Therapy   Conditioning/ROM                                    ADL  Bed Mobility                                               Conditioning/ROM  Transfers                                                     Bed Mobility  Sitting /Standing Balance                         Transfers                                        Gait Training                                               Sitting/Standing Balance  Stair Training [   ]Applicable                    Home equipment Eval                                                                        Splinting  [   ] Only      GOALS:   ADL   [ x  ]   Independent                    Transfers  [ x  ] Independent                          Ambulation  [ x  ] Independent     [  x  ] With device                            [   ]  CG                                                         [   ]  CG                                                                  [   ] CG                            [    ] Min A                                                   [   ] Min A                                                              [   ] Min  A          DISCHARGE PLAN:   [xx  ]  Good candidate for Intensive Rehabilitation/Hospital based-[facility]A [facility]                                             Will tolerate 3hrs Intensive Rehab Daily                                       [    ]  Short Term Rehab in Skilled Nursing Facility                                       [    ]  Home with Outpatient or  services                                         [    ]  Possible Candidate for Intensive Hospital based Rehab                                       
81y Female with h/o HLD, presented with slurred speech found to have acute occlusion of LICA, s/p mechanical thrombectomy. The rest of work up unremarkable EVERETT pending  EP consulted for long term cardiac monitoring to monitor for etiology of cryptogenic stroke.  On EKG 6/25 2:1 atrial flutter    CVA  AFL  HLD  [id number] Score is 4    con't tele  start AC, recommend Eliquis 5mg bid  no need for long term cardiac monitoring at this time  Maintain electrolytes K>4.0 Mg >2.0

## 2022-06-26 NOTE — PROGRESS NOTE ADULT - ASSESSMENT
81F presenting with dysarthria, mild right facial droop, and RUE/RLE weakness. 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 s/p tpa @ 1630, 6/22. No NI intervention 2/2 NIHSS improving from 4 --> 1. After tpa, patient became aphasic, dilated left pupil, dense right-sided hemiplegia, and decreased sensation right lower arm and RLE, however still able to follow commands on left side. Code NI reactivated. Patient taken for mechanical thrombectomy. 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 Patient extubated post procedure, noted to be improving neurologically. Repeat CTH stable, and pt taken off levophed.     #L MCA Stroke s/p TPA and Thrombectomy c/b Acute SAH and Hemorrhagic Conversion   Neuro reccs:   - Neuro checks Q4  - MRI brain: Acute left basal ganglia infarct with associated mass effect upon the adjacent left lateral ventricle and slight shift of the third and lateral ventricles to the right of midline. Small acute infarcts the left frontal, left temporal, and left  parietal regions  - repeat CTH 6/25:  Slightly decreased subarachnoid hemorrhage in the left sylvian fissure and adjacent left temporal and parietal sulci compared to the prior CT. Redemonstrated evolving acute infarct in the left basal ganglia, left frontal lobe, and left temporal lobe with hemorrhagic transformation in the left subinsular region.   - EVERETT pending  - ILR pending  - F/u repeat CTH for stable hemorrhage  -  - 140  - Seizure precautions, Keppra 500 BID x 7 days  - ICU delirium precautions  - Stroke core measures done   - PT/OT/Rehab  - Atorvastatin 10mg qhs    #Hx of Tobacco Use   - Nicotine patch     #HLD   - c/w atorvastatin      DVT ppx: currently on heparin SQ, AC as per neuro given SAH     Total time spent to complete patient's bedside assessment, review medical chart, discuss medical plan of care with covering medical team was more than 35 minutes  with >50% of time spent face to face with patient, discussion with patient/family and/or coordination of care    Gisell Melgar DO      81F presenting with dysarthria, mild right facial droop, and RUE/RLE weakness. 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 s/p tpa @ 1630, 6/22. No NI intervention 2/2 NIHSS improving from 4 --> 1. After tpa, patient became aphasic, dilated left pupil, dense right-sided hemiplegia, and decreased sensation right lower arm and RLE, however still able to follow commands on left side. Code NI reactivated. Patient taken for mechanical thrombectomy. 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 Patient extubated post procedure, noted to be improving neurologically. Repeat CTH stable, and pt taken off levophed.     #L MCA Stroke s/p TPA and Thrombectomy c/b Acute SAH and Hemorrhagic Conversion   Neuro reccs:   - Neuro checks Q4  - MRI brain: Acute left basal ganglia infarct with associated mass effect upon the adjacent left lateral ventricle and slight shift of the third and lateral ventricles to the right of midline. Small acute infarcts the left frontal, left temporal, and left  parietal regions  - repeat CTH 6/25:  Slightly decreased subarachnoid hemorrhage in the left sylvian fissure and adjacent left temporal and parietal sulci compared to the prior CT. Redemonstrated evolving acute infarct in the left basal ganglia, left frontal lobe, and left temporal lobe with hemorrhagic transformation in the left subinsular region.   - EVERETT pending  - ILR pending  - F/u repeat CTH for stable hemorrhage  -  - 140  - Seizure precautions, Keppra 500 BID x 7 days  - ICU delirium precautions  - Stroke core measures done   - PT/OT/Rehab  - Atorvastatin 10mg qhs    # New Onset Aflutter   - tachycardic yesterday and EKG reported to be A flutter with 2:1 conduction   - received PO metoprolol and now HR improved, suspect pt converted to sinus   - will repeat EKG   - appreciate EP eval especially in light of stroke     #Hx of Tobacco Use   - Nicotine patch     #HLD   - c/w atorvastatin      DVT ppx: currently on heparin SQ, AC as per neuro/cardio given SAH     Total time spent to complete patient's bedside assessment, review medical chart, discuss medical plan of care with covering medical team was more than 35 minutes  with >50% of time spent face to face with patient, discussion with patient/family and/or coordination of care    Gisell Melgar DO

## 2022-06-27 LAB
ALBUMIN SERPL ELPH-MCNC: 3.5 G/DL — SIGNIFICANT CHANGE UP (ref 3.5–5.2)
ALP SERPL-CCNC: 46 U/L — SIGNIFICANT CHANGE UP (ref 30–115)
ALT FLD-CCNC: 10 U/L — SIGNIFICANT CHANGE UP (ref 0–41)
ANION GAP SERPL CALC-SCNC: 8 MMOL/L — SIGNIFICANT CHANGE UP (ref 7–14)
AST SERPL-CCNC: 15 U/L — SIGNIFICANT CHANGE UP (ref 0–41)
BILIRUB SERPL-MCNC: 0.8 MG/DL — SIGNIFICANT CHANGE UP (ref 0.2–1.2)
BUN SERPL-MCNC: 11 MG/DL — SIGNIFICANT CHANGE UP (ref 10–20)
CALCIUM SERPL-MCNC: 8.7 MG/DL — SIGNIFICANT CHANGE UP (ref 8.5–10.1)
CHLORIDE SERPL-SCNC: 101 MMOL/L — SIGNIFICANT CHANGE UP (ref 98–110)
CO2 SERPL-SCNC: 29 MMOL/L — SIGNIFICANT CHANGE UP (ref 17–32)
CREAT SERPL-MCNC: <0.5 MG/DL — LOW (ref 0.7–1.5)
EGFR: 108 ML/MIN/1.73M2 — SIGNIFICANT CHANGE UP
GLUCOSE SERPL-MCNC: 88 MG/DL — SIGNIFICANT CHANGE UP (ref 70–99)
HCT VFR BLD CALC: 31.3 % — LOW (ref 37–47)
HGB BLD-MCNC: 10.7 G/DL — LOW (ref 12–16)
MCHC RBC-ENTMCNC: 29.7 PG — SIGNIFICANT CHANGE UP (ref 27–31)
MCHC RBC-ENTMCNC: 34.2 G/DL — SIGNIFICANT CHANGE UP (ref 32–37)
MCV RBC AUTO: 86.9 FL — SIGNIFICANT CHANGE UP (ref 81–99)
NRBC # BLD: 0 /100 WBCS — SIGNIFICANT CHANGE UP (ref 0–0)
PLATELET # BLD AUTO: 276 K/UL — SIGNIFICANT CHANGE UP (ref 130–400)
POTASSIUM SERPL-MCNC: 3.9 MMOL/L — SIGNIFICANT CHANGE UP (ref 3.5–5)
POTASSIUM SERPL-SCNC: 3.9 MMOL/L — SIGNIFICANT CHANGE UP (ref 3.5–5)
PROT SERPL-MCNC: 5.8 G/DL — LOW (ref 6–8)
RBC # BLD: 3.6 M/UL — LOW (ref 4.2–5.4)
RBC # FLD: 12.2 % — SIGNIFICANT CHANGE UP (ref 11.5–14.5)
SODIUM SERPL-SCNC: 138 MMOL/L — SIGNIFICANT CHANGE UP (ref 135–146)
WBC # BLD: 5.74 K/UL — SIGNIFICANT CHANGE UP (ref 4.8–10.8)
WBC # FLD AUTO: 5.74 K/UL — SIGNIFICANT CHANGE UP (ref 4.8–10.8)

## 2022-06-27 PROCEDURE — 70450 CT HEAD/BRAIN W/O DYE: CPT | Mod: 26

## 2022-06-27 PROCEDURE — 99233 SBSQ HOSP IP/OBS HIGH 50: CPT

## 2022-06-27 PROCEDURE — 99232 SBSQ HOSP IP/OBS MODERATE 35: CPT

## 2022-06-27 RX ORDER — ATORVASTATIN CALCIUM 80 MG/1
80 TABLET, FILM COATED ORAL AT BEDTIME
Refills: 0 | Status: DISCONTINUED | OUTPATIENT
Start: 2022-06-27 | End: 2022-06-29

## 2022-06-27 RX ORDER — METOPROLOL TARTRATE 50 MG
12.5 TABLET ORAL
Refills: 0 | Status: DISCONTINUED | OUTPATIENT
Start: 2022-06-27 | End: 2022-06-29

## 2022-06-27 RX ADMIN — POLYETHYLENE GLYCOL 3350 17 GRAM(S): 17 POWDER, FOR SOLUTION ORAL at 05:08

## 2022-06-27 RX ADMIN — Medication 1 PATCH: at 12:56

## 2022-06-27 RX ADMIN — Medication 12.5 MILLIGRAM(S): at 14:34

## 2022-06-27 RX ADMIN — LEVETIRACETAM 400 MILLIGRAM(S): 250 TABLET, FILM COATED ORAL at 05:07

## 2022-06-27 RX ADMIN — POLYETHYLENE GLYCOL 3350 17 GRAM(S): 17 POWDER, FOR SOLUTION ORAL at 17:58

## 2022-06-27 RX ADMIN — Medication 1 PATCH: at 11:44

## 2022-06-27 RX ADMIN — SENNA PLUS 2 TABLET(S): 8.6 TABLET ORAL at 21:01

## 2022-06-27 RX ADMIN — FAMOTIDINE 20 MILLIGRAM(S): 10 INJECTION INTRAVENOUS at 12:56

## 2022-06-27 RX ADMIN — Medication 1 PATCH: at 19:39

## 2022-06-27 RX ADMIN — Medication 1 PATCH: at 12:20

## 2022-06-27 RX ADMIN — LEVETIRACETAM 400 MILLIGRAM(S): 250 TABLET, FILM COATED ORAL at 17:58

## 2022-06-27 RX ADMIN — Medication 10 MILLIGRAM(S): at 21:01

## 2022-06-27 RX ADMIN — Medication 81 MILLIGRAM(S): at 12:56

## 2022-06-27 RX ADMIN — HEPARIN SODIUM 5000 UNIT(S): 5000 INJECTION INTRAVENOUS; SUBCUTANEOUS at 05:08

## 2022-06-27 RX ADMIN — ATORVASTATIN CALCIUM 80 MILLIGRAM(S): 80 TABLET, FILM COATED ORAL at 21:01

## 2022-06-27 RX ADMIN — HEPARIN SODIUM 5000 UNIT(S): 5000 INJECTION INTRAVENOUS; SUBCUTANEOUS at 17:58

## 2022-06-27 RX ADMIN — CHLORHEXIDINE GLUCONATE 1 APPLICATION(S): 213 SOLUTION TOPICAL at 05:08

## 2022-06-27 NOTE — PROGRESS NOTE ADULT - ASSESSMENT
This is a 80 yo F presenting with dysarthria, mild right facial droop, and RUE/RLE weakness. 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 s/p tpa @ 1630, 6/22. No NI intervention 2/2 NIHSS improving from 4 --> 1. After tpa, patient became aphasic, dilated left pupil, dense right-sided hemiplegia, and decreased sensation right lower arm and RLE, however still able to follow commands on left side. Patient taken for mechanical thrombectomy. Left proximal ICA/M1 occlusion with TICI 0-2C.  Post CT head showed acute SAH in the silvian fissure and left frontal/temporal/parietal sulci. Transferred to Stroke Unit for further monitoring and work-up.    Plan  Ischemic Stroke / SAH  - Neuro checks Q4  - MRI brain: Acute left basal ganglia infarct with associated mass effect upon the adjacent left lateral ventricle and slight shift of the third and lateral ventricles to the right of midline. Small acute infarcts the left frontal, left temporal, and left  parietal regions  - ILR cancelled as new onset of aflutter, EP consulted and aware. Recommends AC; however given bleed and size of stroke will hold off AC. Started ASA 6/26  - Repeat CTH (6/25): Slightly decreased subarachnoid hemorrhage in the left sylvian fissure and adjacent left temporal and parietal sulci compared to the prior CT. Redemonstrated evolving acute infarct in the left basal ganglia, left frontal lobe, and left temporal lobe with hemorrhagic transformation in the left subinsular region.   - F/u Repeat CT Head, f/u  - Hold off on A/C for now  - Seizure precautions, Keppra 500 BID x 7 days  - Atorvastatin 80 mg qhs  - Stroke core measures done   - PT/OT/Rehab    Cardio  -  - 140  - Added metoprolol 12.5 mg q12 hrs  - Continue high intensity statin  - LDL: 75  - Hold off on A/C for now    PULM: Smoker   - Nicotine patch   - HOB > 35 degrees  - Aspiration precautions  - Can d/c nasal cannula    GI  - Minced and moist diet  - Added Dulcolax for BM    Renal/Lytes  - Keep K > 4, Mg > 2, replete PRN     Misc:  - Diet  - GI prophylaxis [X] other: H2 Blocker - on ant acid at home   - Bowel regimen [X] Miralax [X] senna [] other: Added Dulcolax  - VTE prophylaxis: [X] SCDs [X chemoprophylaxis  - PT/OT/SLP- consult   Discussed with Neurology attending  This is a 80 yo F presenting with dysarthria, mild right facial droop, and RUE/RLE weakness. 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 s/p tpa @ 1630, 6/22. No NI intervention 2/2 NIHSS improving from 4 --> 1. After tpa, patient became aphasic, dilated left pupil, dense right-sided hemiplegia, and decreased sensation right lower arm and RLE, however still able to follow commands on left side. Patient taken for mechanical thrombectomy. Left proximal ICA/M1 occlusion with TICI 0-2C.  Post CT head showed acute SAH in the silvian fissure and left frontal/temporal/parietal sulci. Transferred to Stroke Unit for further monitoring and work-up.    Plan  Ischemic Stroke / SAH  - Neuro checks Q4  - MRI brain: Acute left basal ganglia infarct with associated mass effect upon the adjacent left lateral ventricle and slight shift of the third and lateral ventricles to the right of midline. Small acute infarcts the left frontal, left temporal, and left  parietal regions  - ILR cancelled as new onset of aflutter, EP consulted and aware. Recommends AC; however given bleed and size of stroke will hold off AC. Started ASA 6/26  - Repeat CTH (6/25): Slightly decreased subarachnoid hemorrhage in the left sylvian fissure and adjacent left temporal and parietal sulci compared to the prior CT. Redemonstrated evolving acute infarct in the left basal ganglia, left frontal lobe, and left temporal lobe with hemorrhagic transformation in the left subinsular region.   - Hold off on A/C for now  - Seizure precautions, Keppra 500 BID x 7 days  - Atorvastatin 80 mg qhs  - Stroke core measures done   - repeat HCT today > if no change, may consider starting anticoagulation  - PT/OT/Rehab    Cardio/new onset Afib  -  - 140  - Added metoprolol 12.5 mg q12 hrs  - Continue high intensity statin  - LDL: 75  - Hold A/C for now - will repeat HCT evaluate SAH     PULM: Smoker   - Nicotine patch   - HOB > 35 degrees  - Aspiration precautions  - Can d/c nasal cannula    GI  - Minced and moist diet  - Added Dulcolax for BM    Renal/Lytes  - Keep K > 4, Mg > 2, replete PRN     Misc:  - Diet  - GI prophylaxis [X] other: H2 Blocker - on ant acid at home   - Bowel regimen [X] Miralax [X] senna [] other: Added Dulcolax  - VTE prophylaxis: [X] SCDs [X chemoprophylaxis  - PT/OT/SLP- consult   - Dispo- family wants patient discharged to [facility]

## 2022-06-27 NOTE — PROGRESS NOTE ADULT - SUBJECTIVE AND OBJECTIVE BOX
HPI:  81-year-old female with PMHx HLD p/w dysarthria, mild right facial droop, and RUE/RLE weakness, LKW 1330, a/w upset stomach, nausea, and decreased PO intake, NIHSS = 4. Stroke code activated. CTH revealed hyperdensity in the left carotid terminus could reflect underlying thrombosis, atherosclerotic disease, or artifact from adjacent bony structures, with no acute territorial infarct, intracranial hemorrhage, or midline shift. 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 The gradual loss of vascular enhancement in the proximal left cervical ICA could reflect pseudoocclusion. CTP revealed delayed perfusion in the left frontotemporal regions along the MCA territory measuring 81 mL. No perfusion evidence of core infarct. Risks versus benefits of tPA discussed with daughter, tPA administered @ 1630. Code NI actual for Left ICA occlusion, cancelled 2/2 improving NIHSS. Patient examined by NCC team during tPA administration, NIHSS = 1 for notable LUE drift. Admit to NCCU for close neurological monitoring and optimization.     Interval events: At 1700, patient aphasic, dilated left pupil, dense right-sided hemiplegia, and decreased sensation right lower arm and RLE, however still able to follow commands on left side. Repeat CTH revealed no acute territorial infarct, intracranial hemorrhage, or midline shift, with retained contrast in the intracranial vessels. Code NI reactivated. Patient taken for mechanical thrombectomy.    (22 Jun 2022 17:32)      PAST MEDICAL & SURGICAL HISTORY:  Hyperlipidemia          Hospital Course:  She is s/p TPA.  She is S/p thrombectomy. Some improvement after the thrombectomy.   She amb 25 ft RW mod assist with PT.  TODAY'S SUBJECTIVE & REVIEW OF SYMPTOMS:     Constitutional WNL   Cardio WNL   Resp WNL   GI WNL  Heme WNL  Endo WNL  Skin WNL  MSK WNL  Neuro WNL  Cognitive WNL  Psych WNL      MEDICATIONS  (STANDING):  atorvastatin 10 milliGRAM(s) Oral at bedtime  calcium gluconate IVPB 1 Gram(s) IV Intermittent once  chlorhexidine 4% Liquid 1 Application(s) Topical <User Schedule>  famotidine    Tablet 20 milliGRAM(s) Oral daily  heparin   Injectable 5000 Unit(s) SubCutaneous every 12 hours  levETIRAcetam  IVPB 500 milliGRAM(s) IV Intermittent every 12 hours  nicotine -  14 mG/24Hr(s) Patch 1 patch Transdermal daily  ondansetron Injectable 4 milliGRAM(s) IV Push once  polyethylene glycol 3350 17 Gram(s) Oral every 12 hours  senna 2 Tablet(s) Oral at bedtime  sodium chloride 0.9%. 1000 milliLiter(s) (75 mL/Hr) IV Continuous <Continuous>    MEDICATIONS  (PRN):      FAMILY HISTORY:      Allergies    No Known Allergies    Intolerances        SOCIAL HISTORY:    [  ] Etoh  [  ] Smoking  [  ] Substance abuse     Home Environment:  [  ] Home Alone  [x  ] Lives with Family-  [  ] Home Health Aid    Dwelling:  [  ] Apartment  [ x ] Private House  [  ] Adult Home  [  ] Skilled Nursing Facility      [  ] Short Term  [  ] Long Term  [x  ] [facility] [place] [  ]    FUNCTIONAL STATUS PTA: (Check all that apply)  Ambulation: [ x  ]Independent    [  ] Dependent     [  ] Non-Ambulatory  Assistive Device: [  ] SA Cane  [  ]  Q Cane  [  ] Walker  [  ]  Wheelchair  ADL : [ x ] Independent  [  ]  Dependent       Vital Signs Last 24 Hrs  T(C): 37 (24 Jun 2022 12:00), Max: 37.6 (23 Jun 2022 16:00)  T(F): 98.6 (24 Jun 2022 12:00), Max: 99.7 (23 Jun 2022 16:00)  HR: 74 (24 Jun 2022 15:00) (66 - 106)  BP: 125/44 (24 Jun 2022 15:00) (116/42 - 157/58)  BP(mean): 62 (24 Jun 2022 15:00) (62 - 91)  RR: 23 (24 Jun 2022 15:00) (18 - 26)  SpO2: 97% (24 Jun 2022 15:00) (93% - 99%)      PHYSICAL EXAM: Alert & Oriented X 0  GENERAL: NAD, well-groomed, well-developed  HEAD:  Atraumatic, Normocephalic  EYES: EOMI, PERRLA, conjunctiva and sclera clear  NECK: Supple, No JVD, Normal thyroid  CHEST/LUNG: Clear bilaterally; No rales, rhonchi, wheezing, or rubs  HEART: Regular rate and rhythm; No murmurs, rubs, or gallops  ABDOMEN: Soft, Nontender, Nondistended; Bowel sounds present  EXTREMITIES:  2+ Peripheral Pulses, No clubbing, cyanosis, or edema    NERVOUS SYSTEM:  Cranial Nerves 2-12 intact [x  ] Abnormal  [  ]    ROM: WFL all extremities [  ]  Abnormal [  ]  Motor Strength: WFL all extremities  [  ]  Abnormal [ x ] right hemiparesis Right biceps 4+/5, right hand intrinsics 4-/5, right hip flex 3/5, right ankle DF 4+/5  Sensation: intact to light touch [x  ] Abnormal [  ]  Reflexes: Symmetric [  ]  Abnormal [  ]    FUNCTIONAL STATUS:  Bed Mobility: Independent [  ]  Supervision [  ]  Needs Assistance [  ]  N/A [  ]  Transfers: Independent [  ]  Supervision [  ]  Needs Assistance [  ]  N/A [  ]   Ambulation: Independent [  ]  Supervision [  ]  Needs Assistance [  ]  N/A [  ]  ADL: Independent [  ] Requires Assistance [  ] N/A [  ]      LABS:                        10.5   10.07 )-----------( 210      ( 24 Jun 2022 04:52 )             30.5     06-24    142  |  107  |  10  ----------------------------<  102<H>  3.3<L>   |  21  |  <0.5<L>    Ca    8.1<L>      24 Jun 2022 04:52  Phos  2.6     06-24  Mg     2.2     06-24    TPro  5.8<L>  /  Alb  3.5  /  TBili  0.8  /  DBili  x   /  AST  14  /  ALT  14  /  AlkPhos  46  06-24    PT/INR - ( 22 Jun 2022 16:45 )   PT: 14.90 sec;   INR: 1.30 ratio         PTT - ( 22 Jun 2022 16:45 )  PTT:28.0 sec      RADIOLOGY & ADDITIONAL STUDIES:    Assesment: HPI:  81-year-old female with PMHx HLD p/w dysarthria, mild right facial droop, and RUE/RLE weakness, LKW 1330, a/w upset stomach, nausea, and decreased PO intake, NIHSS = 4. Stroke code activated. CTH revealed hyperdensity in the left carotid terminus could reflect underlying thrombosis, atherosclerotic disease, or artifact from adjacent bony structures, with no acute territorial infarct, intracranial hemorrhage, or midline shift. 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 The gradual loss of vascular enhancement in the proximal left cervical ICA could reflect pseudoocclusion. CTP revealed delayed perfusion in the left frontotemporal regions along the MCA territory measuring 81 mL. No perfusion evidence of core infarct. Risks versus benefits of tPA discussed with daughter, tPA administered @ 1630. Code NI actual for Left ICA occlusion, cancelled 2/2 improving NIHSS. Patient examined by NCC team during tPA administration, NIHSS = 1 for notable LUE drift. Admit to NCCU for close neurological monitoring and optimization.     Interval events: At 1700, patient aphasic, dilated left pupil, dense right-sided hemiplegia, and decreased sensation right lower arm and RLE, however still able to follow commands on left side. Repeat CTH revealed no acute territorial infarct, intracranial hemorrhage, or midline shift, with retained contrast in the intracranial vessels. Code NI reactivated. Patient taken for mechanical thrombectomy.    (22 Jun 2022 17:32)      PAST MEDICAL & SURGICAL HISTORY:  Hyperlipidemia          Hospital Course:  She is s/p TPA.  She is S/p thrombectomy. Some improvement after the thrombectomy.   She amb 25 ft RW mod assist with PT.  TODAY'S SUBJECTIVE & REVIEW OF SYMPTOMS:     Constitutional WNL   Cardio WNL   Resp WNL   GI WNL  Heme WNL  Endo WNL  Skin WNL  MSK WNL  Neuro WNL  Cognitive WNL  Psych WNL      MEDICATIONS  (STANDING):  atorvastatin 10 milliGRAM(s) Oral at bedtime  calcium gluconate IVPB 1 Gram(s) IV Intermittent once  chlorhexidine 4% Liquid 1 Application(s) Topical <User Schedule>  famotidine    Tablet 20 milliGRAM(s) Oral daily  heparin   Injectable 5000 Unit(s) SubCutaneous every 12 hours  levETIRAcetam  IVPB 500 milliGRAM(s) IV Intermittent every 12 hours  nicotine -  14 mG/24Hr(s) Patch 1 patch Transdermal daily  ondansetron Injectable 4 milliGRAM(s) IV Push once  polyethylene glycol 3350 17 Gram(s) Oral every 12 hours  senna 2 Tablet(s) Oral at bedtime  sodium chloride 0.9%. 1000 milliLiter(s) (75 mL/Hr) IV Continuous <Continuous>    MEDICATIONS  (PRN):      FAMILY HISTORY:      Allergies    No Known Allergies    Intolerances        SOCIAL HISTORY:    [  ] Etoh  [  ] Smoking  [  ] Substance abuse     Home Environment:  [  ] Home Alone  [x  ] Lives with Family-  [  ] Home Health Aid    Dwelling:  [  ] Apartment  [ x ] Private House  [  ] Adult Home  [  ] Skilled Nursing Facility      [  ] Short Term  [  ] Long Term  [x  ] [facility] [place] [  ]    FUNCTIONAL STATUS PTA: (Check all that apply)  Ambulation: [ x  ]Independent    [  ] Dependent     [  ] Non-Ambulatory  Assistive Device: [  ] SA Cane  [  ]  Q Cane  [  ] Walker  [  ]  Wheelchair  ADL : [ x ] Independent  [  ]  Dependent       Vital Signs Last 24 Hrs  T(C): 37 (06-27-22 @ 04:01), Max: 37.4 (06-27-22 @ 02:46)  HR: 78 (06-27-22 @ 14:09) (68 - 90)  BP: 126/57 (06-27-22 @ 14:09) (112/55 - 147/58)  RR: 18 (06-27-22 @ 14:09) (17 - 19)  SpO2: 97% (06-27-22 @ 14:09) (95% - 100%)                PHYSICAL EXAM: Alert & Oriented X 0  GENERAL: NAD, well-groomed, well-developed  HEAD:  Atraumatic, Normocephalic  EYES: EOMI, PERRLA, conjunctiva and sclera clear  NECK: Supple, No JVD, Normal thyroid  CHEST/LUNG: Clear bilaterally; No rales, rhonchi, wheezing, or rubs  HEART: Regular rate and rhythm; No murmurs, rubs, or gallops  ABDOMEN: Soft, Nontender, Nondistended; Bowel sounds present  EXTREMITIES:  2+ Peripheral Pulses, No clubbing, cyanosis, or edema    NERVOUS SYSTEM:  Cranial Nerves 2-12 intact [x  ] Abnormal  [  ]    ROM: WFL all extremities [  ]  Abnormal [  ]  Motor Strength: WFL all extremities  [  ]  Abnormal [ x ] right hemiparesis Right biceps 4+/5, right hand intrinsics 4-/5, right hip flex 3/5, right ankle DF 4+/5  Sensation: intact to light touch [x  ] Abnormal [  ]  Reflexes: Symmetric [  ]  Abnormal [  ]    FUNCTIONAL STATUS:  Bed Mobility: Independent [  ]  Supervision [  ]  Needs Assistance [  ]  N/A [  ]  Transfers: Independent [  ]  Supervision [  ]  Needs Assistance [  ]  N/A [  ]   Ambulation: Independent [  ]  Supervision [  ]  Needs Assistance [  ]  N/A [  ]  ADL: Independent [  ] Requires Assistance [  ] N/A [  ]      LABS:    06-27    138  |  101  |  11  ----------------------------<  88  3.9   |  29  |  <0.5<L>    Ca    8.7      27 Jun 2022 07:58    TPro  5.8<L>  /  Alb  3.5  /  TBili  0.8  /  DBili  x   /  AST  15  /  ALT  10  /  AlkPhos  46  06-27                            10.7   5.74  )-----------( 276      ( 27 Jun 2022 07:58 )             31.3                           10.5   10.07 )-----------( 210      ( 24 Jun 2022 04:52 )             30.5     06-24    142  |  107  |  10  ----------------------------<  102<H>  3.3<L>   |  21  |  <0.5<L>    Ca    8.1<L>      24 Jun 2022 04:52  Phos  2.6     06-24  Mg     2.2     06-24    TPro  5.8<L>  /  Alb  3.5  /  TBili  0.8  /  DBili  x   /  AST  14  /  ALT  14  /  AlkPhos  46  06-24    PT/INR - ( 22 Jun 2022 16:45 )   PT: 14.90 sec;   INR: 1.30 ratio         PTT - ( 22 Jun 2022 16:45 )  PTT:28.0 sec      RADIOLOGY & ADDITIONAL STUDIES:    Assesment: HPI:  81-year-old female with PMHx HLD p/w dysarthria, mild right facial droop, and RUE/RLE weakness, LKW 1330, a/w upset stomach, nausea, and decreased PO intake, NIHSS = 4. Stroke code activated. CTH revealed hyperdensity in the left carotid terminus could reflect underlying thrombosis, atherosclerotic disease, or artifact from adjacent bony structures, with no acute territorial infarct, intracranial hemorrhage, or midline shift. 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 The gradual loss of vascular enhancement in the proximal left cervical ICA could reflect pseudoocclusion. CTP revealed delayed perfusion in the left frontotemporal regions along the MCA territory measuring 81 mL. No perfusion evidence of core infarct. Risks versus benefits of tPA discussed with daughter, tPA administered @ 1630. Code NI actual for Left ICA occlusion, cancelled 2/2 improving NIHSS. Patient examined by NCC team during tPA administration, NIHSS = 1 for notable LUE drift. Admit to NCCU for close neurological monitoring and optimization.     Interval events: At 1700, patient aphasic, dilated left pupil, dense right-sided hemiplegia, and decreased sensation right lower arm and RLE, however still able to follow commands on left side. Repeat CTH revealed no acute territorial infarct, intracranial hemorrhage, or midline shift, with retained contrast in the intracranial vessels. Code NI reactivated. Patient taken for mechanical thrombectomy.    (22 Jun 2022 17:32)      PAST MEDICAL & SURGICAL HISTORY:  Hyperlipidemia          Hospital Course:  She is s/p TPA.  She is S/p thrombectomy. Some improvement after the thrombectomy.   She amb 25 ft RW mod assist with PT.  TODAY'S SUBJECTIVE & REVIEW OF SYMPTOMS:     Constitutional WNL   Cardio WNL   Resp WNL   GI WNL  Heme WNL  Endo WNL  Skin WNL  MSK WNL  Neuro WNL  Cognitive WNL  Psych WNL      MEDICATIONS  (STANDING):  atorvastatin 10 milliGRAM(s) Oral at bedtime  calcium gluconate IVPB 1 Gram(s) IV Intermittent once  chlorhexidine 4% Liquid 1 Application(s) Topical <User Schedule>  famotidine    Tablet 20 milliGRAM(s) Oral daily  heparin   Injectable 5000 Unit(s) SubCutaneous every 12 hours  levETIRAcetam  IVPB 500 milliGRAM(s) IV Intermittent every 12 hours  nicotine -  14 mG/24Hr(s) Patch 1 patch Transdermal daily  ondansetron Injectable 4 milliGRAM(s) IV Push once  polyethylene glycol 3350 17 Gram(s) Oral every 12 hours  senna 2 Tablet(s) Oral at bedtime  sodium chloride 0.9%. 1000 milliLiter(s) (75 mL/Hr) IV Continuous <Continuous>    MEDICATIONS  (PRN):      FAMILY HISTORY:      Allergies    No Known Allergies    Intolerances        SOCIAL HISTORY:    [  ] Etoh  [  ] Smoking  [  ] Substance abuse     Home Environment:  [  ] Home Alone  [x  ] Lives with Family-  [  ] Home Health Aid    Dwelling:  [  ] Apartment  [ x ] Private House  [  ] Adult Home  [  ] Skilled Nursing Facility      [  ] Short Term  [  ] Long Term  [x  ] [facility] [place] [  ]    FUNCTIONAL STATUS PTA: (Check all that apply)  Ambulation: [ x  ]Independent    [  ] Dependent     [  ] Non-Ambulatory  Assistive Device: [  ] SA Cane  [  ]  Q Cane  [  ] Walker  [  ]  Wheelchair  ADL : [ x ] Independent  [  ]  Dependent       Vital Signs Last 24 Hrs  T(C): 37 (06-27-22 @ 04:01), Max: 37.4 (06-27-22 @ 02:46)  HR: 78 (06-27-22 @ 14:09) (68 - 90)  BP: 126/57 (06-27-22 @ 14:09) (112/55 - 147/58)  RR: 18 (06-27-22 @ 14:09) (17 - 19)  SpO2: 97% (06-27-22 @ 14:09) (95% - 100%)                PHYSICAL EXAM: Alert & Oriented X 0  GENERAL: NAD, well-groomed, well-developed  HEAD:  Atraumatic, Normocephalic  EYES: EOMI, PERRLA, conjunctiva and sclera clear  NECK: Supple, No JVD, Normal thyroid  CHEST/LUNG: Clear bilaterally; No rales, rhonchi, wheezing, or rubs  HEART: Regular rate and rhythm; No murmurs, rubs, or gallops  ABDOMEN: Soft, Nontender, Nondistended; Bowel sounds present  EXTREMITIES:  2+ Peripheral Pulses, No clubbing, cyanosis, or edema    NERVOUS SYSTEM:  Cranial Nerves 2-12 intact [x  ] Abnormal  [  ]  She was able to name water; she named a pen a pencil. She has trouble naming several items.  ROM: WFL all extremities [  ]  Abnormal [  ]  Motor Strength: WFL all extremities  [  ]  Abnormal [ x ] right hemiparesis Right biceps 4+/5, right hand intrinsics 4-/5, right hip flex 3/5, right ankle DF 4+/5  Sensation: intact to light touch [x  ] Abnormal [  ]  Reflexes: Symmetric [  ]  Abnormal [  ]    FUNCTIONAL STATUS:  Bed Mobility: Independent [  ]  Supervision [  ]  Needs Assistance [  ]  N/A [  ]  Transfers: Independent [  ]  Supervision [  ]  Needs Assistance [  ]  N/A [  ]   Ambulation: Independent [  ]  Supervision [  ]  Needs Assistance [  ]  N/A [  ]  ADL: Independent [  ] Requires Assistance [  ] N/A [  ]      LABS:    06-27    138  |  101  |  11  ----------------------------<  88  3.9   |  29  |  <0.5<L>    Ca    8.7      27 Jun 2022 07:58    TPro  5.8<L>  /  Alb  3.5  /  TBili  0.8  /  DBili  x   /  AST  15  /  ALT  10  /  AlkPhos  46  06-27                            10.7   5.74  )-----------( 276      ( 27 Jun 2022 07:58 )             31.3                           10.5   10.07 )-----------( 210      ( 24 Jun 2022 04:52 )             30.5     06-24    142  |  107  |  10  ----------------------------<  102<H>  3.3<L>   |  21  |  <0.5<L>    Ca    8.1<L>      24 Jun 2022 04:52  Phos  2.6     06-24  Mg     2.2     06-24    TPro  5.8<L>  /  Alb  3.5  /  TBili  0.8  /  DBili  x   /  AST  14  /  ALT  14  /  AlkPhos  46  06-24    PT/INR - ( 22 Jun 2022 16:45 )   PT: 14.90 sec;   INR: 1.30 ratio         PTT - ( 22 Jun 2022 16:45 )  PTT:28.0 sec      RADIOLOGY & ADDITIONAL STUDIES:    Assesment:

## 2022-06-27 NOTE — PROGRESS NOTE ADULT - SUBJECTIVE AND OBJECTIVE BOX
DASH HENAOISAR  81y, Female  Allergy: No Known Allergies    Hospital Day: 4d    Patient seen and examined earlier today. Resting comfortably, noted to have tachycardia with Aflutter on EKG 1 night ago.     HPI:  81-year-old female with PMHx HLD p/w dysarthria, mild right facial droop, and RUE/RLE weakness, LKW 1330, a/w upset stomach, nausea, and decreased PO intake, NIHSS = 4. Stroke code activated. CTH revealed hyperdensity in the left carotid terminus could reflect underlying thrombosis, atherosclerotic disease, or artifact from adjacent bony structures, with no acute territorial infarct, intracranial hemorrhage, or midline shift. 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 The gradual loss of vascular enhancement in the proximal left cervical ICA could reflect pseudoocclusion. CTP revealed delayed perfusion in the left frontotemporal regions along the MCA territory measuring 81 mL. No perfusion evidence of core infarct. Risks versus benefits of tPA discussed with daughter, tPA administered @ 1630. Code NI actual for Left ICA occlusion, cancelled 2/2 improving NIHSS. Patient examined by NCC team during tPA administration, NIHSS = 1 for notable RUE drift. Admit to NCCU for close neurological monitoring and optimization.     Interval events: At 1700, patient aphasic, dilated left pupil, dense right-sided hemiplegia, and decreased sensation right lower arm and RLE, however still able to follow commands on left side. Repeat CTH revealed no acute territorial infarct, intracranial hemorrhage, or midline shift, with retained contrast in the intracranial vessels. Code NI reactivated. Patient taken for mechanical thrombectomy.    (22 Jun 2022 17:32)    PMH/PSH:  PAST MEDICAL & SURGICAL HISTORY:  Hyperlipidemia      PHYSICAL EXAM:  GENERAL: NAD, A&O x2,  + aphasia   HNENT: EOMI, PERRLA    NECK: No LAD/swelling  CHEST/LUNG:  decreased BS b/l bases  HEART: RRR, No murmurs  ABDOMEN: Soft, NT, ND,  Bowel sounds present  EXTREMITIES:  Warm well perfused, no edema   NEURO: Rt sided weakness    tele: Paroxysmal AFlutter, nonspecific changes (on my own evaluation of tele monitor)            MEDICATIONS  (STANDING):  aspirin enteric coated 81 milliGRAM(s) Oral daily  atorvastatin 10 milliGRAM(s) Oral at bedtime  chlorhexidine 4% Liquid 1 Application(s) Topical <User Schedule>  famotidine    Tablet 20 milliGRAM(s) Oral daily  heparin   Injectable 5000 Unit(s) SubCutaneous every 12 hours  levETIRAcetam  IVPB 500 milliGRAM(s) IV Intermittent every 12 hours  nicotine -  14 mG/24Hr(s) Patch 1 patch Transdermal daily  ondansetron Injectable 4 milliGRAM(s) IV Push once  polyethylene glycol 3350 17 Gram(s) Oral every 12 hours  senna 2 Tablet(s) Oral at bedtime    MEDICATIONS  (PRN):  acetaminophen     Tablet .. 650 milliGRAM(s) Oral every 6 hours PRN Mild Pain (1 - 3)    Home Medications:  atorvastatin 10 mg oral tablet: 1 tab(s) orally once a day (22 Jun 2022 17:48)  latanoprost 0.005% ophthalmic solution: 1 drop(s) to each affected eye once a day (in the evening); left eye (22 Jun 2022 17:48)    Vital Signs Last 24 Hrs  T(C): 37 (27 Jun 2022 04:01), Max: 37.4 (27 Jun 2022 02:46)  T(F): 98.6 (27 Jun 2022 04:01), Max: 99.3 (27 Jun 2022 02:46)  HR: 70 (27 Jun 2022 08:00) (68 - 90)  BP: 115/50 (27 Jun 2022 08:00) (113/59 - 147/58)  BP(mean): 76 (27 Jun 2022 08:00) (68 - 95)  RR: 18 (27 Jun 2022 08:00) (17 - 19)  SpO2: 97% (27 Jun 2022 08:00) (94% - 100%)  CAPILLARY BLOOD GLUCOSE        LABS:                        10.7   5.74  )-----------( 276      ( 27 Jun 2022 07:58 )             31.3     06-27    138  |  101  |  11  ----------------------------<  88  3.9   |  29  |  <0.5<L>    Ca    8.7      27 Jun 2022 07:58    TPro  5.8<L>  /  Alb  3.5  /  TBili  0.8  /  DBili  x   /  AST  15  /  ALT  10  /  AlkPhos  46  06-27    LIVER FUNCTIONS - ( 27 Jun 2022 07:58 )  Alb: 3.5 g/dL / Pro: 5.8 g/dL / ALK PHOS: 46 U/L / ALT: 10 U/L / AST: 15 U/L / GGT: x                           Consultant Notes Reviewed:  [x ] YES  [ ] NO  Care Discussed with Consultants/Other Providers/ Housestaff [ x] YES  [ ] NO  Radiology, labs, new studies personally reviewed.                                 DIDI HENAO  81y, Female  Allergy: No Known Allergies    Patient seen and examined earlier today. Resting comfortably, noted to have tachycardia with Aflutter on EKG 1 night ago. No complaints.    HPI:  81-year-old female with PMHx HLD p/w dysarthria, mild right facial droop, and RUE/RLE weakness, LKW 1330, a/w upset stomach, nausea, and decreased PO intake, NIHSS = 4. Stroke code activated. CTH revealed hyperdensity in the left carotid terminus could reflect underlying thrombosis, atherosclerotic disease, or artifact from adjacent bony structures, with no acute territorial infarct, intracranial hemorrhage, or midline shift. 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 The gradual loss of vascular enhancement in the proximal left cervical ICA could reflect pseudoocclusion. CTP revealed delayed perfusion in the left frontotemporal regions along the MCA territory measuring 81 mL. No perfusion evidence of core infarct. Risks versus benefits of tPA discussed with daughter, tPA administered @ 1630. Code NI actual for Left ICA occlusion, cancelled 2/2 improving NIHSS. Patient examined by NCC team during tPA administration, NIHSS = 1 for notable RUE drift. Admit to NCCU for close neurological monitoring and optimization.     Interval events: At 1700, patient aphasic, dilated left pupil, dense right-sided hemiplegia, and decreased sensation right lower arm and RLE, however still able to follow commands on left side. Repeat CTH revealed no acute territorial infarct, intracranial hemorrhage, or midline shift, with retained contrast in the intracranial vessels. Code NI reactivated. Patient taken for mechanical thrombectomy.    (22 Jun 2022 17:32)    PMH/PSH:  PAST MEDICAL & SURGICAL HISTORY:  Hyperlipidemia      PHYSICAL EXAM:  GENERAL: NAD,  + mixed aphasia (expressive>receptive)  HNENT: EOMI, PERRLA    NECK: No LAD/swelling  CHEST/LUNG:  decreased BS b/l bases  HEART: RRR, No murmurs  ABDOMEN: Soft, NT, ND,  Bowel sounds present  EXTREMITIES:  Warm well perfused, no edema   NEURO: Rt 4/5, LT 5/5    tele: PAF, nonspecific changes (on my own evaluation of tele monitor)            MEDICATIONS  (STANDING):  aspirin enteric coated 81 milliGRAM(s) Oral daily  atorvastatin 10 milliGRAM(s) Oral at bedtime  chlorhexidine 4% Liquid 1 Application(s) Topical <User Schedule>  famotidine    Tablet 20 milliGRAM(s) Oral daily  heparin   Injectable 5000 Unit(s) SubCutaneous every 12 hours  levETIRAcetam  IVPB 500 milliGRAM(s) IV Intermittent every 12 hours  nicotine -  14 mG/24Hr(s) Patch 1 patch Transdermal daily  ondansetron Injectable 4 milliGRAM(s) IV Push once  polyethylene glycol 3350 17 Gram(s) Oral every 12 hours  senna 2 Tablet(s) Oral at bedtime    MEDICATIONS  (PRN):  acetaminophen     Tablet .. 650 milliGRAM(s) Oral every 6 hours PRN Mild Pain (1 - 3)    Home Medications:  atorvastatin 10 mg oral tablet: 1 tab(s) orally once a day (22 Jun 2022 17:48)  latanoprost 0.005% ophthalmic solution: 1 drop(s) to each affected eye once a day (in the evening); left eye (22 Jun 2022 17:48)    Vital Signs Last 24 Hrs  T(C): 37 (27 Jun 2022 04:01), Max: 37.4 (27 Jun 2022 02:46)  T(F): 98.6 (27 Jun 2022 04:01), Max: 99.3 (27 Jun 2022 02:46)  HR: 70 (27 Jun 2022 08:00) (68 - 90)  BP: 115/50 (27 Jun 2022 08:00) (113/59 - 147/58)  BP(mean): 76 (27 Jun 2022 08:00) (68 - 95)  RR: 18 (27 Jun 2022 08:00) (17 - 19)  SpO2: 97% (27 Jun 2022 08:00) (94% - 100%)  CAPILLARY BLOOD GLUCOSE        LABS:                        10.7   5.74  )-----------( 276      ( 27 Jun 2022 07:58 )             31.3     06-27    138  |  101  |  11  ----------------------------<  88  3.9   |  29  |  <0.5<L>    Ca    8.7      27 Jun 2022 07:58    TPro  5.8<L>  /  Alb  3.5  /  TBili  0.8  /  DBili  x   /  AST  15  /  ALT  10  /  AlkPhos  46  06-27    LIVER FUNCTIONS - ( 27 Jun 2022 07:58 )  Alb: 3.5 g/dL / Pro: 5.8 g/dL / ALK PHOS: 46 U/L / ALT: 10 U/L / AST: 15 U/L / GGT: x                           Consultant Notes Reviewed:  [x ] YES  [ ] NO  Care Discussed with Consultants/Other Providers/ Housestaff [ x] YES  [ ] NO  Radiology, labs, new studies personally reviewed.

## 2022-06-27 NOTE — PROGRESS NOTE ADULT - SUBJECTIVE AND OBJECTIVE BOX
Neurology Progress Note    Interval History:      HPI:  81-year-old female with PMHx HLD p/w dysarthria, mild right facial droop, and RUE/RLE weakness, LKW 1330, a/w upset stomach, nausea, and decreased PO intake, NIHSS = 4. Stroke code activated. CTH revealed hyperdensity in the left carotid terminus could reflect underlying thrombosis, atherosclerotic disease, or artifact from adjacent bony structures, with no acute territorial infarct, intracranial hemorrhage, or midline shift. 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 The gradual loss of vascular enhancement in the proximal left cervical ICA could reflect pseudoocclusion. CTP revealed delayed perfusion in the left frontotemporal regions along the MCA territory measuring 81 mL. No perfusion evidence of core infarct. Risks versus benefits of tPA discussed with daughter, tPA administered @ 1630. Code NI actual for Left ICA occlusion, cancelled 2/2 improving NIHSS. Patient examined by NCC team during tPA administration, NIHSS = 1 for notable LUE drift. Admit to NCCU for close neurological monitoring and optimization.     Interval events: At 1700, patient aphasic, dilated left pupil, dense right-sided hemiplegia, and decreased sensation right lower arm and RLE, however still able to follow commands on left side. Repeat CTH revealed no acute territorial infarct, intracranial hemorrhage, or midline shift, with retained contrast in the intracranial vessels. Code NI reactivated. Patient taken for mechanical thrombectomy.    (22 Jun 2022 17:32)      PAST MEDICAL & SURGICAL HISTORY:  Hyperlipidemia            Medications:  acetaminophen     Tablet .. 650 milliGRAM(s) Oral every 6 hours PRN  aspirin enteric coated 81 milliGRAM(s) Oral daily  atorvastatin 10 milliGRAM(s) Oral at bedtime  chlorhexidine 4% Liquid 1 Application(s) Topical <User Schedule>  famotidine    Tablet 20 milliGRAM(s) Oral daily  heparin   Injectable 5000 Unit(s) SubCutaneous every 12 hours  levETIRAcetam  IVPB 500 milliGRAM(s) IV Intermittent every 12 hours  nicotine -  14 mG/24Hr(s) Patch 1 patch Transdermal daily  ondansetron Injectable 4 milliGRAM(s) IV Push once  polyethylene glycol 3350 17 Gram(s) Oral every 12 hours  senna 2 Tablet(s) Oral at bedtime      Vital Signs Last 24 Hrs  T(C): 37 (27 Jun 2022 04:01), Max: 37.4 (27 Jun 2022 02:46)  T(F): 98.6 (27 Jun 2022 04:01), Max: 99.3 (27 Jun 2022 02:46)  HR: 70 (27 Jun 2022 08:00) (68 - 90)  BP: 115/50 (27 Jun 2022 08:00) (113/59 - 147/58)  BP(mean): 76 (27 Jun 2022 08:00) (68 - 95)  RR: 18 (27 Jun 2022 08:00) (17 - 19)  SpO2: 97% (27 Jun 2022 08:00) (94% - 100%)    Neurological Exam:   Mental status: Awake, alert and oriented x3.  Recent and remote memory intact.  Naming, repetition and comprehension intact.  Attention/concentration intact.  No dysarthria, no aphasia.  Fund of knowledge appropriate.    Cranial nerves: Pupils equally round and reactive to light, visual fields full, no nystagmus, extraocular muscles intact, V1 through V3 intact bilaterally and symmetric, face symmetric, hearing intact to finger rub, palate elevation symmetric, tongue was midline.  Motor:  MRC grading 5/5 b/l UE/LE.   strength 5/5.  Normal tone and bulk.  No abnormal movements.    Sensation: Intact to light touch, proprioception, and pinprick.   Coordination: No dysmetria on finger-to-nose and heel-to-shin.  No dysdiadokinesia.  Reflexes: 2+ in bilateral UE/LE, downgoing toes bilaterally. (-) Armas.  Gait: Narrow and steady. No ataxia.  Romberg negative    Labs:  CBC Full  -  ( 27 Jun 2022 07:58 )  WBC Count : 5.74 K/uL  RBC Count : 3.60 M/uL  Hemoglobin : 10.7 g/dL  Hematocrit : 31.3 %  Platelet Count - Automated : 276 K/uL  Mean Cell Volume : 86.9 fL  Mean Cell Hemoglobin : 29.7 pg  Mean Cell Hemoglobin Concentration : 34.2 g/dL  Auto Neutrophil # : x  Auto Lymphocyte # : x  Auto Monocyte # : x  Auto Eosinophil # : x  Auto Basophil # : x  Auto Neutrophil % : x  Auto Lymphocyte % : x  Auto Monocyte % : x  Auto Eosinophil % : x  Auto Basophil % : x    06-27    138  |  101  |  11  ----------------------------<  88  3.9   |  29  |  <0.5<L>    Ca    8.7      27 Jun 2022 07:58    TPro  5.8<L>  /  Alb  3.5  /  TBili  0.8  /  DBili  x   /  AST  15  /  ALT  10  /  AlkPhos  46  06-27    LIVER FUNCTIONS - ( 27 Jun 2022 07:58 )  Alb: 3.5 g/dL / Pro: 5.8 g/dL / ALK PHOS: 46 U/L / ALT: 10 U/L / AST: 15 U/L / GGT: x                 Assessment:  This is a 81y Female w/ h/o     Plan: Neurology Progress Note    Interval History:    Patient seen and examined at bedside. There were no acute events overnight. Patient states she feels well, denied any complaints.      PAST MEDICAL & SURGICAL HISTORY:  Hyperlipidemia      Medications:  acetaminophen     Tablet .. 650 milliGRAM(s) Oral every 6 hours PRN  aspirin enteric coated 81 milliGRAM(s) Oral daily  atorvastatin 10 milliGRAM(s) Oral at bedtime  chlorhexidine 4% Liquid 1 Application(s) Topical <User Schedule>  famotidine    Tablet 20 milliGRAM(s) Oral daily  heparin   Injectable 5000 Unit(s) SubCutaneous every 12 hours  levETIRAcetam  IVPB 500 milliGRAM(s) IV Intermittent every 12 hours  nicotine -  14 mG/24Hr(s) Patch 1 patch Transdermal daily  ondansetron Injectable 4 milliGRAM(s) IV Push once  polyethylene glycol 3350 17 Gram(s) Oral every 12 hours  senna 2 Tablet(s) Oral at bedtime      Vital Signs Last 24 Hrs  T(C): 37 (2022 04:01), Max: 37.4 (2022 02:46)  T(F): 98.6 (2022 04:01), Max: 99.3 (2022 02:46)  HR: 70 (2022 08:00) (68 - 90)  BP: 115/50 (2022 08:00) (113/59 - 147/58)  BP(mean): 76 (2022 08:00) (68 - 95)  RR: 18 (2022 08:00) (17 - 19)  SpO2: 97% (2022 08:00) (94% - 100%)      Neurological Examination:  General: Appearance is consistent with chronologic age.  Cognitive/Language: Awake, alert, and oriented to person, , and month. 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  Cranial Nerves  - Eyes: Visual acuity intact, Visual fields full. EOMI w/o nystagmus  - Face:  Facial sensation normal, no facial asymmetry.    - Ears/Nose/Throat:  Hearing grossly intact, tongue midline.   Motor examination: Upper Extremities: L 5/5, R 3+/5; Lower extremities: L 5/5, R 4+/5. RUE/RLE drift.  Sensory examination: Intact to light touch in B/L UE and LE.  Cerebellum: FTN impaired on right side.     NIHSS 6      Labs:  CBC Full  -  ( 2022 07:58 )  WBC Count : 5.74 K/uL  RBC Count : 3.60 M/uL  Hemoglobin : 10.7 g/dL  Hematocrit : 31.3 %  Platelet Count - Automated : 276 K/uL  Mean Cell Volume : 86.9 fL  Mean Cell Hemoglobin : 29.7 pg  Mean Cell Hemoglobin Concentration : 34.2 g/dL          138  |  101  |  11  ----------------------------<  88  3.9   |  29  |  <0.5<L>    Ca    8.7      2022 07:58    TPro  5.8<L>  /  Alb  3.5  /  TBili  0.8  /  DBili  x   /  AST  15  /  ALT  10  /  AlkPhos  46      LIVER FUNCTIONS - ( 2022 07:58 )  Alb: 3.5 g/dL / Pro: 5.8 g/dL / ALK PHOS: 46 U/L / ALT: 10 U/L / AST: 15 U/L / GGT: x                 Assessment:  This is a 81y Female w/ h/o     Plan: Neurology Progress Note    Interval History:    Patient seen and examined at bedside. There were no acute events overnight. Patient states she feels well, denied any complaints.      PAST MEDICAL & SURGICAL HISTORY:  Hyperlipidemia      Medications:  acetaminophen     Tablet .. 650 milliGRAM(s) Oral every 6 hours PRN  aspirin enteric coated 81 milliGRAM(s) Oral daily  atorvastatin 10 milliGRAM(s) Oral at bedtime  chlorhexidine 4% Liquid 1 Application(s) Topical <User Schedule>  famotidine    Tablet 20 milliGRAM(s) Oral daily  heparin   Injectable 5000 Unit(s) SubCutaneous every 12 hours  levETIRAcetam  IVPB 500 milliGRAM(s) IV Intermittent every 12 hours  nicotine -  14 mG/24Hr(s) Patch 1 patch Transdermal daily  ondansetron Injectable 4 milliGRAM(s) IV Push once  polyethylene glycol 3350 17 Gram(s) Oral every 12 hours  senna 2 Tablet(s) Oral at bedtime      Vital Signs Last 24 Hrs  T(C): 37 (2022 04:01), Max: 37.4 (2022 02:46)  T(F): 98.6 (2022 04:01), Max: 99.3 (2022 02:46)  HR: 70 (2022 08:00) (68 - 90)  BP: 115/50 (2022 08:00) (113/59 - 147/58)  BP(mean): 76 (2022 08:00) (68 - 95)  RR: 18 (2022 08:00) (17 - 19)  SpO2: 97% (2022 08:00) (94% - 100%)      Neurological Examination:  General: Appearance is consistent with chronologic age.  Cognitive/Language: Awake, alert, and oriented to person, , and month. 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  Cranial Nerves  - Eyes: Visual acuity intact, Visual fields full. EOMI w/o nystagmus  - Face:  Facial sensation normal, no facial asymmetry.    - Ears/Nose/Throat:  Hearing grossly intact, tongue midline.   Motor examination: Upper Extremities: L 5/5, R 3+/5; Lower extremities: L 5/5, R 4+/5. RUE/RLE drift.  Sensory examination: Intact to light touch in B/L UE and LE.  Cerebellum: FTN impaired on right side.     NIHSS 6      Labs:  CBC Full  -  ( 2022 07:58 )  WBC Count : 5.74 K/uL  RBC Count : 3.60 M/uL  Hemoglobin : 10.7 g/dL  Hematocrit : 31.3 %  Platelet Count - Automated : 276 K/uL  Mean Cell Volume : 86.9 fL  Mean Cell Hemoglobin : 29.7 pg  Mean Cell Hemoglobin Concentration : 34.2 g/dL          138  |  101  |  11  ----------------------------<  88  3.9   |  29  |  <0.5<L>    Ca    8.7      2022 07:58    TPro  5.8<L>  /  Alb  3.5  /  TBili  0.8  /  DBili  x   /  AST  15  /  ALT  10  /  AlkPhos  46      LIVER FUNCTIONS - ( 2022 07:58 )  Alb: 3.5 g/dL / Pro: 5.8 g/dL / ALK PHOS: 46 U/L / ALT: 10 U/L / AST: 15 U/L / GGT: x            Neurology Progress Note    Interval History:    Patient seen and examined at bedside. There were no acute events overnight. Patient states she feels well, denied any complaints.    PAST MEDICAL & SURGICAL HISTORY:  Hyperlipidemia    Medications:  acetaminophen     Tablet .. 650 milliGRAM(s) Oral every 6 hours PRN  aspirin enteric coated 81 milliGRAM(s) Oral daily  atorvastatin 10 milliGRAM(s) Oral at bedtime  chlorhexidine 4% Liquid 1 Application(s) Topical <User Schedule>  famotidine    Tablet 20 milliGRAM(s) Oral daily  heparin   Injectable 5000 Unit(s) SubCutaneous every 12 hours  levETIRAcetam  IVPB 500 milliGRAM(s) IV Intermittent every 12 hours  nicotine -  14 mG/24Hr(s) Patch 1 patch Transdermal daily  ondansetron Injectable 4 milliGRAM(s) IV Push once  polyethylene glycol 3350 17 Gram(s) Oral every 12 hours  senna 2 Tablet(s) Oral at bedtime    Vital Signs Last 24 Hrs  T(C): 37 (2022 04:01), Max: 37.4 (2022 02:46)  T(F): 98.6 (2022 04:01), Max: 99.3 (2022 02:46)  HR: 70 (2022 08:00) (68 - 90)  BP: 115/50 (2022 08:00) (113/59 - 147/58)  BP(mean): 76 (2022 08:00) (68 - 95)  RR: 18 (2022 08:00) (17 - 19)  SpO2: 97% (2022 08:00) (94% - 100%)      Neurological Examination:  General: Appearance is consistent with chronologic age.  Cognitive/Language: Awake, alert, and oriented to person, , and month. 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  Cranial Nerves  - Eyes: Visual acuity intact, Visual fields full. EOMI w/o nystagmus  - Face:  Facial sensation normal, no facial asymmetry.    - Ears/Nose/Throat:  Hearing grossly intact, tongue midline.   Motor examination: Upper Extremities: L 5/5, R 3+/5; Lower extremities: L 5/5, R 4+/5. RUE/RLE drift.  Sensory examination: Intact to light touch in B/L UE and LE.  Cerebellum: FTN impaired on right side.     NIHSS 6      Labs:  CBC Full  -  ( 2022 07:58 )  WBC Count : 5.74 K/uL  RBC Count : 3.60 M/uL  Hemoglobin : 10.7 g/dL  Hematocrit : 31.3 %  Platelet Count - Automated : 276 K/uL  Mean Cell Volume : 86.9 fL  Mean Cell Hemoglobin : 29.7 pg  Mean Cell Hemoglobin Concentration : 34.2 g/dL          138  |  101  |  11  ----------------------------<  88  3.9   |  29  |  <0.5<L>    Ca    8.7      2022 07:58    TPro  5.8<L>  /  Alb  3.5  /  TBili  0.8  /  DBili  x   /  AST  15  /  ALT  10  /  AlkPhos  46      LIVER FUNCTIONS - ( 2022 07:58 )  Alb: 3.5 g/dL / Pro: 5.8 g/dL / ALK PHOS: 46 U/L / ALT: 10 U/L / AST: 15 U/L / GGT: x

## 2022-06-27 NOTE — PROGRESS NOTE ADULT - ASSESSMENT
81F presenting with dysarthria, mild right facial droop, and RUE/RLE weakness. 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 s/p tpa @ 1630, 6/22. No NI intervention 2/2 NIHSS improving from 4 --> 1. After tpa, patient became aphasic, dilated left pupil, dense right-sided hemiplegia, and decreased sensation right lower arm and RLE, however still able to follow commands on left side. Code NI reactivated. Patient taken for mechanical thrombectomy. 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 Patient extubated post procedure, noted to be improving neurologically. Repeat CTH stable, and pt taken off levophed.     #L MCA Stroke w/ Brain Compression s/p TPA and Thrombectomy c/b Acute SAH and Hemorrhagic Conversion   - Neuro checks Q4  - MRI brain: Acute left basal ganglia infarct with associated mass effect upon the adjacent left lateral ventricle and slight shift of the third and lateral ventricles to the right of midline. Small acute infarcts the left frontal, left temporal, and left  parietal regions  - repeat CTH 6/25:  Slightly decreased subarachnoid hemorrhage in the left sylvian fissure and adjacent left temporal and parietal sulci compared to the prior CT. Redemonstrated evolving acute infarct in the left basal ganglia, left frontal lobe, and left temporal lobe with hemorrhagic transformation in the left subinsular region.   - TTE pending  - F/u repeat CTH for stable hemorrhage  -  - 140  - Seizure precautions, Keppra 500 BID x 7 days  - Stroke core measures done   - PT/OT/Rehab  - Atorvastatin     # New Onset Aflutter   - tachycardic and EKG reported to be A flutter with 2:1 conduction   - received PO metoprolol and now HR improved  -A/c once ok w/ neuro  -cont Metoprolol for HR control    #Hx of Tobacco Use   - Nicotine patch     #HLD   - c/w atorvastatin      DVT ppx: currently on heparin SQ, SCDs    #Progress Note Handoff  Pending: Consults____Clinical improvement and stability__x___Tests__TTE______PT____x____  Pt/Family discussion: Pt informed and agrees with the current plan  Disposition: Home______/SNF_______/4A______/To be determined____x____    My note supersedes the residents note should a discrepancy arise.    Chart and notes personally reviewed.  Care Discussed with Consultants/Other Providers/ Housestaff [ x] YES [ ] NO   Radiology, labs, old records personally reviewed.    discussed w/ housestaff, nursing, case management, neuro team    THIS IS AN INCOMPLETE NOTE     81F presenting with dysarthria, mild right facial droop, and RUE/RLE weakness. 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 s/p tpa @ 1630, 6/22. No NI intervention 2/2 NIHSS improving from 4 --> 1. After tpa, patient became aphasic, dilated left pupil, dense right-sided hemiplegia, and decreased sensation right lower arm and RLE, however still able to follow commands on left side. Code NI reactivated. Patient taken for mechanical thrombectomy. 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 Patient extubated post procedure, noted to be improving neurologically. Repeat CTH stable, and pt taken off levophed.     #L MCA Stroke w/ Brain Compression s/p TPA and Thrombectomy c/b Acute SAH and Hemorrhagic Conversion   - likely cardioembolic due to PAF  - Neuro checks Q4  - MRI brain: Acute left basal ganglia infarct with associated mass effect upon the adjacent left lateral ventricle and slight shift of the third and lateral ventricles to the right of midline. Small acute infarcts the left frontal, left temporal, and left  parietal regions  - repeat CTH 6/25:  Slightly decreased subarachnoid hemorrhage in the left sylvian fissure and adjacent left temporal and parietal sulci compared to the prior CT. Redemonstrated evolving acute infarct in the left basal ganglia, left frontal lobe, and left temporal lobe with hemorrhagic transformation in the left subinsular region.   - TTE nl EF  - F/u repeat CTH for stable hemorrhage  -  - 140  - Seizure precautions, Keppra 500 BID x 7 days  - Stroke core measures done   - PT/OT/Rehab  - Atorvastatin     # New Onset PAF   - tachycardic and EKG reported to be A flutter with 2:1 conduction   - received PO metoprolol and now HR improved  -A/c once ok w/ neuro (has SAH)  -cont Metoprolol for HR control    #Hx of Tobacco Use   - Nicotine patch     #HLD   - c/w atorvastatin      DVT ppx: currently on heparin SQ, SCDs    #Progress Note Handoff  Pending: Consults____Clinical improvement and stability__x___Tests__CTH______PT____x____  Pt/Family discussion: Pt/friend informed and agree with the current plan  Disposition: Home______/SNF_______/4A______/To be determined____x____    My note supersedes the residents note should a discrepancy arise.    Chart and notes personally reviewed.  Care Discussed with Consultants/Other Providers/ Housestaff [ x] YES [ ] NO   Radiology, labs, old records personally reviewed.    discussed w/ housestaff, nursing, case management, neuro team, best friend (also physician)

## 2022-06-27 NOTE — PROGRESS NOTE ADULT - ASSESSMENT
IMPRESSION: Rehab of CVA, right hemiparesis, aphasia, s/p TPA, s/p thrombectomy    PRECAUTIONS: [x  ] Cardiac  [  ] Respiratory  [  ] Seizures [  ] Contact Isolation  [  ] Droplet Isolation  [  ] Other    Weight Bearing Status:     RECOMMENDATION:    Out of Bed to Chair     DVT/Decubiti Prophylaxis    REHAB PLAN:     [ x  ] Bedside P/T 3-5 times a week   [  x ]   Bedside O/T  2-3 times a week             [   ] No Rehab Therapy Indicated                   [  x ]  Speech Therapy   Conditioning/ROM                                    ADL  Bed Mobility                                               Conditioning/ROM  Transfers                                                     Bed Mobility  Sitting /Standing Balance                         Transfers                                        Gait Training                                               Sitting/Standing Balance  Stair Training [   ]Applicable                    Home equipment Eval                                                                        Splinting  [   ] Only      GOALS:   ADL   [ x  ]   Independent                    Transfers  [ x  ] Independent                          Ambulation  [ x  ] Independent     [  x  ] With device                            [   ]  CG                                                         [   ]  CG                                                                  [   ] CG                            [    ] Min A                                                   [   ] Min A                                                              [   ] Min  A          DISCHARGE PLAN:   [xx  ]  Excellent candidate for Intensive Rehabilitation/Hospital based-4A [facility]                                             Will tolerate 3hrs Intensive Rehab Daily                                       [    ]  Short Term Rehab in Skilled Nursing Facility                                       [    ]  Home with Outpatient or  services                                         [    ]  Possible Candidate for Intensive Hospital based Rehab

## 2022-06-28 LAB
ALBUMIN SERPL ELPH-MCNC: 3.6 G/DL — SIGNIFICANT CHANGE UP (ref 3.5–5.2)
ALP SERPL-CCNC: 50 U/L — SIGNIFICANT CHANGE UP (ref 30–115)
ALT FLD-CCNC: 11 U/L — SIGNIFICANT CHANGE UP (ref 0–41)
ANION GAP SERPL CALC-SCNC: 9 MMOL/L — SIGNIFICANT CHANGE UP (ref 7–14)
AST SERPL-CCNC: 17 U/L — SIGNIFICANT CHANGE UP (ref 0–41)
BILIRUB SERPL-MCNC: 0.8 MG/DL — SIGNIFICANT CHANGE UP (ref 0.2–1.2)
BUN SERPL-MCNC: 9 MG/DL — LOW (ref 10–20)
CALCIUM SERPL-MCNC: 8.8 MG/DL — SIGNIFICANT CHANGE UP (ref 8.5–10.1)
CHLORIDE SERPL-SCNC: 97 MMOL/L — LOW (ref 98–110)
CO2 SERPL-SCNC: 29 MMOL/L — SIGNIFICANT CHANGE UP (ref 17–32)
CREAT SERPL-MCNC: <0.5 MG/DL — LOW (ref 0.7–1.5)
EGFR: 101 ML/MIN/1.73M2 — SIGNIFICANT CHANGE UP
GLUCOSE SERPL-MCNC: 100 MG/DL — HIGH (ref 70–99)
HCT VFR BLD CALC: 33.4 % — LOW (ref 37–47)
HGB BLD-MCNC: 11.3 G/DL — LOW (ref 12–16)
MAGNESIUM SERPL-MCNC: 2 MG/DL — SIGNIFICANT CHANGE UP (ref 1.8–2.4)
MCHC RBC-ENTMCNC: 29.1 PG — SIGNIFICANT CHANGE UP (ref 27–31)
MCHC RBC-ENTMCNC: 33.8 G/DL — SIGNIFICANT CHANGE UP (ref 32–37)
MCV RBC AUTO: 86.1 FL — SIGNIFICANT CHANGE UP (ref 81–99)
NRBC # BLD: 0 /100 WBCS — SIGNIFICANT CHANGE UP (ref 0–0)
PHOSPHATE SERPL-MCNC: 4.2 MG/DL — SIGNIFICANT CHANGE UP (ref 2.1–4.9)
PLATELET # BLD AUTO: 344 K/UL — SIGNIFICANT CHANGE UP (ref 130–400)
POTASSIUM SERPL-MCNC: 4.5 MMOL/L — SIGNIFICANT CHANGE UP (ref 3.5–5)
POTASSIUM SERPL-SCNC: 4.5 MMOL/L — SIGNIFICANT CHANGE UP (ref 3.5–5)
PROT SERPL-MCNC: 6.2 G/DL — SIGNIFICANT CHANGE UP (ref 6–8)
RBC # BLD: 3.88 M/UL — LOW (ref 4.2–5.4)
RBC # FLD: 12.1 % — SIGNIFICANT CHANGE UP (ref 11.5–14.5)
SARS-COV-2 RNA SPEC QL NAA+PROBE: SIGNIFICANT CHANGE UP
SODIUM SERPL-SCNC: 135 MMOL/L — SIGNIFICANT CHANGE UP (ref 135–146)
WBC # BLD: 6.54 K/UL — SIGNIFICANT CHANGE UP (ref 4.8–10.8)
WBC # FLD AUTO: 6.54 K/UL — SIGNIFICANT CHANGE UP (ref 4.8–10.8)

## 2022-06-28 PROCEDURE — 99233 SBSQ HOSP IP/OBS HIGH 50: CPT

## 2022-06-28 PROCEDURE — 99232 SBSQ HOSP IP/OBS MODERATE 35: CPT

## 2022-06-28 RX ADMIN — Medication 1 PATCH: at 13:52

## 2022-06-28 RX ADMIN — LEVETIRACETAM 400 MILLIGRAM(S): 250 TABLET, FILM COATED ORAL at 05:12

## 2022-06-28 RX ADMIN — Medication 81 MILLIGRAM(S): at 13:52

## 2022-06-28 RX ADMIN — CHLORHEXIDINE GLUCONATE 1 APPLICATION(S): 213 SOLUTION TOPICAL at 05:11

## 2022-06-28 RX ADMIN — ATORVASTATIN CALCIUM 80 MILLIGRAM(S): 80 TABLET, FILM COATED ORAL at 21:05

## 2022-06-28 RX ADMIN — LEVETIRACETAM 400 MILLIGRAM(S): 250 TABLET, FILM COATED ORAL at 17:47

## 2022-06-28 RX ADMIN — HEPARIN SODIUM 5000 UNIT(S): 5000 INJECTION INTRAVENOUS; SUBCUTANEOUS at 05:11

## 2022-06-28 RX ADMIN — Medication 1 PATCH: at 07:45

## 2022-06-28 RX ADMIN — POLYETHYLENE GLYCOL 3350 17 GRAM(S): 17 POWDER, FOR SOLUTION ORAL at 17:47

## 2022-06-28 RX ADMIN — Medication 12.5 MILLIGRAM(S): at 17:47

## 2022-06-28 RX ADMIN — HEPARIN SODIUM 5000 UNIT(S): 5000 INJECTION INTRAVENOUS; SUBCUTANEOUS at 17:46

## 2022-06-28 RX ADMIN — FAMOTIDINE 20 MILLIGRAM(S): 10 INJECTION INTRAVENOUS at 13:52

## 2022-06-28 RX ADMIN — Medication 12.5 MILLIGRAM(S): at 05:11

## 2022-06-28 NOTE — PROGRESS NOTE ADULT - SUBJECTIVE AND OBJECTIVE BOX
Neurology Progress Note    Interval History:    Patient seen and examined at bedside. There were no acute events overnight. Patient states she feels well, denied any complaints.      PAST MEDICAL & SURGICAL HISTORY:  Hyperlipidemia      Medications:  acetaminophen     Tablet .. 650 milliGRAM(s) Oral every 6 hours PRN  aspirin enteric coated 81 milliGRAM(s) Oral daily  atorvastatin 80 milliGRAM(s) Oral at bedtime  bisacodyl Suppository 10 milliGRAM(s) Rectal at bedtime  chlorhexidine 4% Liquid 1 Application(s) Topical <User Schedule>  famotidine    Tablet 20 milliGRAM(s) Oral daily  heparin   Injectable 5000 Unit(s) SubCutaneous every 12 hours  levETIRAcetam  IVPB 500 milliGRAM(s) IV Intermittent every 12 hours  metoprolol tartrate 12.5 milliGRAM(s) Oral two times a day  nicotine -  14 mG/24Hr(s) Patch 1 patch Transdermal daily  ondansetron Injectable 4 milliGRAM(s) IV Push once  polyethylene glycol 3350 17 Gram(s) Oral every 12 hours  senna 2 Tablet(s) Oral at bedtime      Vital Signs Last 24 Hrs  T(C): 36.9 (2022 11:30), Max: 37.3 (2022 18:38)  T(F): 98.5 (2022 11:30), Max: 99.1 (2022 18:38)  HR: 76 (2022 14:45) (70 - 82)  BP: 112/48 (2022 14:45) (112/48 - 158/58)  BP(mean): 68 (2022 14:45) (67 - 96)  RR: 16 (2022 14:45) (16 - 19)  SpO2: 97% (2022 14:45) (94% - 99%)      Neurological Examination:  General: Appearance is consistent with chronologic age.  Cognitive/Language: Awake, alert, and oriented to person, , and month. 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  Cranial Nerves  - Eyes: Visual acuity intact, Visual fields full. EOMI w/o nystagmus  - Face:  Facial sensation normal, no facial asymmetry.    - Ears/Nose/Throat:  Hearing grossly intact, tongue midline.   Motor examination: Upper Extremities: L 5/5, R 3+/5; Lower extremities: L 5/5, R 4+/5. RUE/RLE drift.  Sensory examination: Intact to light touch in B/L UE and LE.  Cerebellum: FTN impaired on right side.     NIHSS 6      Labs:  CBC Full  -  ( 2022 07:20 )  WBC Count : 6.54 K/uL  RBC Count : 3.88 M/uL  Hemoglobin : 11.3 g/dL  Hematocrit : 33.4 %  Platelet Count - Automated : 344 K/uL  Mean Cell Volume : 86.1 fL  Mean Cell Hemoglobin : 29.1 pg  Mean Cell Hemoglobin Concentration : 33.8 g/dL          135  |  97<L>  |  9<L>  ----------------------------<  100<H>  4.5   |  29  |  <0.5<L>    Ca    8.8      2022 07:20  Phos  4.2       Mg     2.0         TPro  6.2  /  Alb  3.6  /  TBili  0.8  /  DBili  x   /  AST  17  /  ALT  11  /  AlkPhos  50      LIVER FUNCTIONS - ( 2022 07:20 )  Alb: 3.6 g/dL / Pro: 6.2 g/dL / ALK PHOS: 50 U/L / ALT: 11 U/L / AST: 17 U/L / GGT: x

## 2022-06-28 NOTE — PROGRESS NOTE ADULT - ASSESSMENT
IMPRESSION: Rehab of CVA, right hemiparesis, expressive aphasia, s/p TPA, s/p thrombectomy     PRECAUTIONS: [x  ] Cardiac  [  ] Respiratory  [  ] Seizures [  ] Contact Isolation  [  ] Droplet Isolation  [  ] Other    Weight Bearing Status:     RECOMMENDATION:    Out of Bed to Chair     DVT/Decubiti Prophylaxis    REHAB PLAN:     [ x  ] Bedside P/T 3-5 times a week   [  x ]   Bedside O/T  2-3 times a week             [   ] No Rehab Therapy Indicated                   [  x ]  Speech Therapy   Conditioning/ROM                                    ADL  Bed Mobility                                               Conditioning/ROM  Transfers                                                     Bed Mobility  Sitting /Standing Balance                         Transfers                                        Gait Training                                               Sitting/Standing Balance  Stair Training [   ]Applicable                    Home equipment Eval                                                                        Splinting  [   ] Only      GOALS:   ADL   [ x  ]   Independent                    Transfers  [ x  ] Independent                          Ambulation  [ x  ] Independent     [  x  ] With device                            [   ]  CG                                                         [   ]  CG                                                                  [   ] CG                            [    ] Min A                                                   [   ] Min A                                                              [   ] Min  A          DISCHARGE PLAN:   [xx  ]  Excellent candidate for Intensive Rehabilitation/Hospital based                                             Will tolerate 3hrs Intensive Rehab Daily                                       [    ]  Short Term Rehab in Skilled Nursing Facility                                       [    ]  Home with Outpatient or  services                                         [    ]  Possible Candidate for Intensive Hospital based Rehab

## 2022-06-28 NOTE — PROGRESS NOTE ADULT - ASSESSMENT
81F presenting with dysarthria, mild right facial droop, and RUE/RLE weakness. 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 s/p tpa @ 1630, 6/22. No NI intervention 2/2 NIHSS improving from 4 --> 1. After tpa, patient became aphasic, dilated left pupil, dense right-sided hemiplegia, and decreased sensation right lower arm and RLE, however still able to follow commands on left side. Code NI reactivated. Patient taken for mechanical thrombectomy. 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 Patient extubated post procedure, noted to be improving neurologically. Repeat CTH stable, and pt taken off levophed.     #L MCA Stroke w/ Brain Compression s/p TPA and Thrombectomy c/b Acute SAH and Hemorrhagic Conversion   - likely cardioembolic due to PAF  - Neuro checks Q4  - MRI brain: Acute left basal ganglia infarct with associated mass effect upon the adjacent left lateral ventricle and slight shift of the third and lateral ventricles to the right of midline. Small acute infarcts the left frontal, left temporal, and left  parietal regions  - repeat CTH 6/25:  Slightly decreased subarachnoid hemorrhage in the left sylvian fissure and adjacent left temporal and parietal sulci compared to the prior CT. Redemonstrated evolving acute infarct in the left basal ganglia, left frontal lobe, and left temporal lobe with hemorrhagic transformation in the left subinsular region.   - TTE nl EF  - repeat CTH w/ stable hemorrhage (last one on 6/27)  -  - 140  - Seizure precautions, Keppra 500 BID x 7 days  - Stroke core measures done   - PT/OT/Rehab  - Atorvastatin     # New Onset PAF   - tachycardic and EKG reported to be A flutter with 2:1 conduction   - received PO metoprolol and now HR improved  -A/c once ok w/ neuro (has SAH)  -cont Metoprolol for HR control    #Hx of Tobacco Use   - Nicotine patch     #HLD   - c/w atorvastatin      DVT ppx: currently on heparin SQ, SCDs    #Progress Note Handoff  Pending: Consults____Clinical improvement and stability__x___Tests______PT____x____  Pt/Family discussion: Pt/friend informed and agree with the current plan  Disposition: ?[facility]__    My note supersedes the residents note should a discrepancy arise.    Chart and notes personally reviewed.  Care Discussed with Consultants/Other Providers/ Housestaff [ x] YES [ ] NO   Radiology, labs, old records personally reviewed.    discussed w/ housestaff, nursing, case management, neuro team, best friend (also physician)

## 2022-06-28 NOTE — PROGRESS NOTE ADULT - SUBJECTIVE AND OBJECTIVE BOX
DIDI HENAO  81y, Female  Allergy: No Known Allergies    Patient seen and examined earlier today. Resting comfortably. No complaints.    HPI:  81-year-old female with PMHx HLD p/w dysarthria, mild right facial droop, and RUE/RLE weakness, LKW 1330, a/w upset stomach, nausea, and decreased PO intake, NIHSS = 4. Stroke code activated. CTH revealed hyperdensity in the left carotid terminus could reflect underlying thrombosis, atherosclerotic disease, or artifact from adjacent bony structures, with no acute territorial infarct, intracranial hemorrhage, or midline shift. 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 The gradual loss of vascular enhancement in the proximal left cervical ICA could reflect pseudoocclusion. CTP revealed delayed perfusion in the left frontotemporal regions along the MCA territory measuring 81 mL. No perfusion evidence of core infarct. Risks versus benefits of tPA discussed with daughter, tPA administered @ 1630. Code NI actual for Left ICA occlusion, cancelled 2/2 improving NIHSS. Patient examined by NCC team during tPA administration, NIHSS = 1 for notable RUE drift. Admit to NCCU for close neurological monitoring and optimization.     Interval events: At 1700, patient aphasic, dilated left pupil, dense right-sided hemiplegia, and decreased sensation right lower arm and RLE, however still able to follow commands on left side. Repeat CTH revealed no acute territorial infarct, intracranial hemorrhage, or midline shift, with retained contrast in the intracranial vessels. Code NI reactivated. Patient taken for mechanical thrombectomy.    (22 Jun 2022 17:32)    PMH/PSH:  PAST MEDICAL & SURGICAL HISTORY:  Hyperlipidemia      PHYSICAL EXAM:  GENERAL: NAD,  + mixed aphasia (expressive>receptive)  HNENT: EOMI, PERRLA    NECK: No LAD/swelling  CHEST/LUNG:  decreased BS b/l bases  HEART: RRR, No murmurs  ABDOMEN: Soft, NT, ND,  Bowel sounds present  EXTREMITIES:  Warm well perfused, no edema   NEURO: Rt 4/5, LT 5/5    tele: PAF, nonspecific changes (on my own evaluation of tele monitor)            MEDICATIONS  (STANDING):  aspirin enteric coated 81 milliGRAM(s) Oral daily  atorvastatin 80 milliGRAM(s) Oral at bedtime  bisacodyl Suppository 10 milliGRAM(s) Rectal at bedtime  chlorhexidine 4% Liquid 1 Application(s) Topical <User Schedule>  famotidine    Tablet 20 milliGRAM(s) Oral daily  heparin   Injectable 5000 Unit(s) SubCutaneous every 12 hours  levETIRAcetam  IVPB 500 milliGRAM(s) IV Intermittent every 12 hours  metoprolol tartrate 12.5 milliGRAM(s) Oral two times a day  nicotine -  14 mG/24Hr(s) Patch 1 patch Transdermal daily  ondansetron Injectable 4 milliGRAM(s) IV Push once  polyethylene glycol 3350 17 Gram(s) Oral every 12 hours  senna 2 Tablet(s) Oral at bedtime    MEDICATIONS  (PRN):  acetaminophen     Tablet .. 650 milliGRAM(s) Oral every 6 hours PRN Mild Pain (1 - 3)    Home Medications:  atorvastatin 10 mg oral tablet: 1 tab(s) orally once a day (22 Jun 2022 17:48)  latanoprost 0.005% ophthalmic solution: 1 drop(s) to each affected eye once a day (in the evening); left eye (22 Jun 2022 17:48)    Vital Signs Last 24 Hrs  T(C): 36.9 (28 Jun 2022 11:30), Max: 37.3 (27 Jun 2022 18:38)  T(F): 98.5 (28 Jun 2022 11:30), Max: 99.1 (27 Jun 2022 18:38)  HR: 76 (28 Jun 2022 14:45) (70 - 82)  BP: 112/48 (28 Jun 2022 14:45) (112/48 - 158/58)  BP(mean): 68 (28 Jun 2022 14:45) (67 - 96)  RR: 16 (28 Jun 2022 14:45) (16 - 19)  SpO2: 97% (28 Jun 2022 14:45) (94% - 99%)  CAPILLARY BLOOD GLUCOSE        LABS:                        11.3   6.54  )-----------( 344      ( 28 Jun 2022 07:20 )             33.4     06-28    135  |  97<L>  |  9<L>  ----------------------------<  100<H>  4.5   |  29  |  <0.5<L>    Ca    8.8      28 Jun 2022 07:20  Phos  4.2     06-28  Mg     2.0     06-28    TPro  6.2  /  Alb  3.6  /  TBili  0.8  /  DBili  x   /  AST  17  /  ALT  11  /  AlkPhos  50  06-28    LIVER FUNCTIONS - ( 28 Jun 2022 07:20 )  Alb: 3.6 g/dL / Pro: 6.2 g/dL / ALK PHOS: 50 U/L / ALT: 11 U/L / AST: 17 U/L / GGT: x                           Consultant Notes Reviewed:  [x ] YES  [ ] NO  Care Discussed with Consultants/Other Providers/ Housestaff [ x] YES  [ ] NO  Radiology, labs, new studies personally reviewed.

## 2022-06-28 NOTE — PROGRESS NOTE ADULT - SUBJECTIVE AND OBJECTIVE BOX
HPI:  81-year-old female with PMHx HLD p/w dysarthria, mild right facial droop, and RUE/RLE weakness, LKW 1330, a/w upset stomach, nausea, and decreased PO intake, NIHSS = 4. Stroke code activated. CTH revealed hyperdensity in the left carotid terminus could reflect underlying thrombosis, atherosclerotic disease, or artifact from adjacent bony structures, with no acute territorial infarct, intracranial hemorrhage, or midline shift. 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 The gradual loss of vascular enhancement in the proximal left cervical ICA could reflect pseudoocclusion. CTP revealed delayed perfusion in the left frontotemporal regions along the MCA territory measuring 81 mL. No perfusion evidence of core infarct. Risks versus benefits of tPA discussed with daughter, tPA administered @ 1630. Code NI actual for Left ICA occlusion, cancelled 2/2 improving NIHSS. Patient examined by NCC team during tPA administration, NIHSS = 1 for notable LUE drift. Admit to NCCU for close neurological monitoring and optimization.     Interval events: At 1700, patient aphasic, dilated left pupil, dense right-sided hemiplegia, and decreased sensation right lower arm and RLE, however still able to follow commands on left side. Repeat CTH revealed no acute territorial infarct, intracranial hemorrhage, or midline shift, with retained contrast in the intracranial vessels. Code NI reactivated. Patient taken for mechanical thrombectomy.    (22 Jun 2022 17:32)      PAST MEDICAL & SURGICAL HISTORY:  Hyperlipidemia          Hospital Course:  She is s/p TPA.  She is S/p thrombectomy. Some improvement after the thrombectomy.   She amb 25 ft RW mod assist with PT on 24th.  TODAY'S SUBJECTIVE & REVIEW OF SYMPTOMS:  On telemetry it appears she is in sinus rhythm at 81 today 6/28.   Paucity of speech. Denies complaint.     Constitutional WNL   Cardio WNL   Resp WNL   GI WNL  Heme WNL  Endo WNL  Skin WNL  MSK WNL  Neuro WNL  Cognitive WNL  Psych WNL      MEDICATIONS  (STANDING):  atorvastatin 10 milliGRAM(s) Oral at bedtime  calcium gluconate IVPB 1 Gram(s) IV Intermittent once  chlorhexidine 4% Liquid 1 Application(s) Topical <User Schedule>  famotidine    Tablet 20 milliGRAM(s) Oral daily  heparin   Injectable 5000 Unit(s) SubCutaneous every 12 hours  levETIRAcetam  IVPB 500 milliGRAM(s) IV Intermittent every 12 hours  nicotine -  14 mG/24Hr(s) Patch 1 patch Transdermal daily  ondansetron Injectable 4 milliGRAM(s) IV Push once  polyethylene glycol 3350 17 Gram(s) Oral every 12 hours  senna 2 Tablet(s) Oral at bedtime  sodium chloride 0.9%. 1000 milliLiter(s) (75 mL/Hr) IV Continuous <Continuous>    MEDICATIONS  (PRN):      FAMILY HISTORY:      Allergies    No Known Allergies    Intolerances        SOCIAL HISTORY:    [  ] Etoh  [  ] Smoking  [  ] Substance abuse     Home Environment:  [  ] Home Alone  [x  ] Lives with Family-  [  ] Home Health Aid    Dwelling:  [  ] Apartment  [ x ] Private House  [  ] Adult Home  [  ] Skilled Nursing Facility      [  ] Short Term  [  ] Long Term  [x  ] [facility] [place]       E[place] [  ]    FUNCTIONAL STATUS PTA: (Check all that apply)  Ambulation: [ x  ]Independent    [  ] Dependent     [  ] Non-Ambulatory  Assistive Device: [  ] SA Cane  [  ]  Q Cane  [  ] Walker  [  ]  Wheelchair  ADL : [ x ] Independent  [  ]  Dependent       Vital Signs Last 24 Hrs  T(C): 37 (06-27-22 @ 04:01), Max: 37.4 (06-27-22 @ 02:46)  HR: 78 (06-27-22 @ 14:09) (68 - 90)  BP: 126/57 (06-27-22 @ 14:09) (112/55 - 147/58)  RR: 18 (06-27-22 @ 14:09) (17 - 19)  SpO2: 97% (06-27-22 @ 14:09) (95% - 100%)                PHYSICAL EXAM: Alert & Oriented X 0  GENERAL: NAD, well-groomed, well-developed  HEAD:  Atraumatic, Normocephalic  EYES: EOMI, PERRLA, conjunctiva and sclera clear  NECK: Supple, No JVD, Normal thyroid  CHEST/LUNG: Clear bilaterally; No rales, rhonchi, wheezing, or rubs  HEART: Regular rate and rhythm; No murmurs, rubs, or gallops  ABDOMEN: Soft, Nontender, Nondistended; Bowel sounds present  EXTREMITIES:  2+ Peripheral Pulses, No clubbing, cyanosis, or edema    NERVOUS SYSTEM:  Cranial Nerves 2-12 intact [x  ] Abnormal  [  ]  She has trouble naming several items. She is cooperative.   ROM: WFL all extremities [  ]  Abnormal [  ]  Motor Strength: WFL all extremities  [  ]  Abnormal [ x ] right hemiparesis RUE weaker distally right hand intrinsics 4-/5; RLE weaker proximally right hip flex 3+/5  Sensation: intact to light touch [x  ] Abnormal [  ]  Reflexes: Symmetric [  ]  Abnormal [  ]    FUNCTIONAL STATUS:  Bed Mobility: Independent [  ]  Supervision [  ]  Needs Assistance [  ]  N/A [  ]  Transfers: Independent [  ]  Supervision [  ]  Needs Assistance [  ]  N/A [  ]   Ambulation: Independent [  ]  Supervision [  ]  Needs Assistance [  ]  N/A [  ]  ADL: Independent [  ] Requires Assistance [  ] N/A [  ]      LABS:    06-27    138  |  101  |  11  ----------------------------<  88  3.9   |  29  |  <0.5<L>    Ca    8.7      27 Jun 2022 07:58    TPro  5.8<L>  /  Alb  3.5  /  TBili  0.8  /  DBili  x   /  AST  15  /  ALT  10  /  AlkPhos  46  06-27                            10.7   5.74  )-----------( 276      ( 27 Jun 2022 07:58 )             31.3                           10.5   10.07 )-----------( 210      ( 24 Jun 2022 04:52 )             30.5     06-24    142  |  107  |  10  ----------------------------<  102<H>  3.3<L>   |  21  |  <0.5<L>    Ca    8.1<L>      24 Jun 2022 04:52  Phos  2.6     06-24  Mg     2.2     06-24    TPro  5.8<L>  /  Alb  3.5  /  TBili  0.8  /  DBili  x   /  AST  14  /  ALT  14  /  AlkPhos  46  06-24    PT/INR - ( 22 Jun 2022 16:45 )   PT: 14.90 sec;   INR: 1.30 ratio         PTT - ( 22 Jun 2022 16:45 )  PTT:28.0 sec      RADIOLOGY & ADDITIONAL STUDIES:    Assesment: HPI:  81-year-old female with PMHx HLD p/w dysarthria, mild right facial droop, and RUE/RLE weakness, LKW 1330, a/w upset stomach, nausea, and decreased PO intake, NIHSS = 4. Stroke code activated. CTH revealed hyperdensity in the left carotid terminus could reflect underlying thrombosis, atherosclerotic disease, or artifact from adjacent bony structures, with no acute territorial infarct, intracranial hemorrhage, or midline shift. 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 The gradual loss of vascular enhancement in the proximal left cervical ICA could reflect pseudoocclusion. CTP revealed delayed perfusion in the left frontotemporal regions along the MCA territory measuring 81 mL. No perfusion evidence of core infarct. Risks versus benefits of tPA discussed with daughter, tPA administered @ 1630. Code NI actual for Left ICA occlusion, cancelled 2/2 improving NIHSS. Patient examined by NCC team during tPA administration, NIHSS = 1 for notable LUE drift. Admit to NCCU for close neurological monitoring and optimization.     Interval events: At 1700, patient aphasic, dilated left pupil, dense right-sided hemiplegia, and decreased sensation right lower arm and RLE, however still able to follow commands on left side. Repeat CTH revealed no acute territorial infarct, intracranial hemorrhage, or midline shift, with retained contrast in the intracranial vessels. Code NI reactivated. Patient taken for mechanical thrombectomy.    (22 Jun 2022 17:32)      PAST MEDICAL & SURGICAL HISTORY:  Hyperlipidemia          Hospital Course:  She is s/p TPA.  She is S/p thrombectomy. Some improvement after the thrombectomy.   She amb 25 ft RW mod assist with PT on 24th.  TODAY'S SUBJECTIVE & REVIEW OF SYMPTOMS:  On telemetry it appears she is in sinus rhythm at 79 today 6/28.   Paucity of speech. Denies complaint.     Constitutional WNL   Cardio WNL   Resp WNL   GI WNL  Heme WNL  Endo WNL  Skin WNL  MSK WNL  Neuro WNL  Cognitive WNL  Psych WNL      MEDICATIONS  (STANDING):  atorvastatin 10 milliGRAM(s) Oral at bedtime  calcium gluconate IVPB 1 Gram(s) IV Intermittent once  chlorhexidine 4% Liquid 1 Application(s) Topical <User Schedule>  famotidine    Tablet 20 milliGRAM(s) Oral daily  heparin   Injectable 5000 Unit(s) SubCutaneous every 12 hours  levETIRAcetam  IVPB 500 milliGRAM(s) IV Intermittent every 12 hours  nicotine -  14 mG/24Hr(s) Patch 1 patch Transdermal daily  ondansetron Injectable 4 milliGRAM(s) IV Push once  polyethylene glycol 3350 17 Gram(s) Oral every 12 hours  senna 2 Tablet(s) Oral at bedtime  sodium chloride 0.9%. 1000 milliLiter(s) (75 mL/Hr) IV Continuous <Continuous>    MEDICATIONS  (PRN):      FAMILY HISTORY:      Allergies    No Known Allergies    Intolerances        SOCIAL HISTORY:    [  ] Etoh  [  ] Smoking  [  ] Substance abuse     Home Environment:  [  ] Home Alone  [x  ] Lives with Family-  [  ] Home Health Aid    Dwelling:  [  ] Apartment  [ x ] Private House  [  ] Adult Home  [  ] Skilled Nursing Facility      [  ] Short Term  [  ] Long Term  [x  ] [place] [place]       [place] [  ]    FUNCTIONAL STATUS PTA: (Check all that apply)  Ambulation: [ x  ]Independent    [  ] Dependent     [  ] Non-Ambulatory  Assistive Device: [  ] SA Cane  [  ]  Q Cane  [  ] Walker  [  ]  Wheelchair  ADL : [ x ] Independent  [  ]  Dependent       Vital Signs Last 24 Hrs  T(C): 36.9 (06-28-22 @ 11:30), Max: 37.3 (06-27-22 @ 18:38)  HR: 76 (06-28-22 @ 14:45) (70 - 82)  BP: 112/48 (06-28-22 @ 14:45) (112/48 - 158/58)  RR: 16 (06-28-22 @ 14:45) (16 - 19)  SpO2: 97% (06-28-22 @ 14:45) (94% - 99%)              PHYSICAL EXAM: Alert & Oriented X 0  GENERAL: NAD, well-groomed, well-developed  HEAD:  Atraumatic, Normocephalic  EYES: EOMI, PERRLA, conjunctiva and sclera clear  NECK: Supple, No JVD, Normal thyroid  CHEST/LUNG: Clear bilaterally; No rales, rhonchi, wheezing, or rubs  HEART: Regular rate and rhythm; No murmurs, rubs, or gallops  ABDOMEN: Soft, Nontender, Nondistended; Bowel sounds present  EXTREMITIES:  2+ Peripheral Pulses, No clubbing, cyanosis, or edema    NERVOUS SYSTEM:  Cranial Nerves 2-12 intact [x  ] Abnormal  [  ]  She has trouble naming several items. She is cooperative.   ROM: WFL all extremities [  ]  Abnormal [  ]  Motor Strength: WFL all extremities  [  ]  Abnormal [ x ] right hemiparesis RUE weaker distally right hand intrinsics 4-/5; RLE weaker proximally right hip flex 3+/5  Sensation: intact to light touch [x  ] Abnormal [  ]  Reflexes: Symmetric [  ]  Abnormal [  ]    FUNCTIONAL STATUS:  Bed Mobility: Independent [  ]  Supervision [  ]  Needs Assistance [  ]  N/A [  ]  Transfers: Independent [  ]  Supervision [  ]  Needs Assistance [  ]  N/A [  ]   Ambulation: Independent [  ]  Supervision [  ]  Needs Assistance [  ]  N/A [  ]  ADL: Independent [  ] Requires Assistance [  ] N/A [  ]      LABS:    06-28    135  |  97<L>  |  9<L>  ----------------------------<  100<H>  4.5   |  29  |  <0.5<L>    Ca    8.8      28 Jun 2022 07:20  Phos  4.2     06-28  Mg     2.0     06-28    TPro  6.2  /  Alb  3.6  /  TBili  0.8  /  DBili  x   /  AST  17  /  ALT  11  /  AlkPhos  50  06-28                            11.3   6.54  )-----------( 344      ( 28 Jun 2022 07:20 )             33.4       06-27    138  |  101  |  11  ----------------------------<  88  3.9   |  29  |  <0.5<L>    Ca    8.7      27 Jun 2022 07:58    TPro  5.8<L>  /  Alb  3.5  /  TBili  0.8  /  DBili  x   /  AST  15  /  ALT  10  /  AlkPhos  46  06-27                            10.7   5.74  )-----------( 276      ( 27 Jun 2022 07:58 )             31.3                           10.5   10.07 )-----------( 210      ( 24 Jun 2022 04:52 )             30.5     06-24    142  |  107  |  10  ----------------------------<  102<H>  3.3<L>   |  21  |  <0.5<L>    Ca    8.1<L>      24 Jun 2022 04:52  Phos  2.6     06-24  Mg     2.2     06-24    TPro  5.8<L>  /  Alb  3.5  /  TBili  0.8  /  DBili  x   /  AST  14  /  ALT  14  /  AlkPhos  46  06-24    PT/INR - ( 22 Jun 2022 16:45 )   PT: 14.90 sec;   INR: 1.30 ratio         PTT - ( 22 Jun 2022 16:45 )  PTT:28.0 sec      RADIOLOGY & ADDITIONAL STUDIES:    Assesment:

## 2022-06-29 ENCOUNTER — TRANSCRIPTION ENCOUNTER (OUTPATIENT)
Age: 82
End: 2022-06-29

## 2022-06-29 VITALS
DIASTOLIC BLOOD PRESSURE: 53 MMHG | HEART RATE: 86 BPM | RESPIRATION RATE: 18 BRPM | TEMPERATURE: 97 F | SYSTOLIC BLOOD PRESSURE: 111 MMHG

## 2022-06-29 LAB
ANION GAP SERPL CALC-SCNC: 11 MMOL/L — SIGNIFICANT CHANGE UP (ref 7–14)
BUN SERPL-MCNC: 9 MG/DL — LOW (ref 10–20)
CALCIUM SERPL-MCNC: 8.9 MG/DL — SIGNIFICANT CHANGE UP (ref 8.5–10.1)
CHLORIDE SERPL-SCNC: 99 MMOL/L — SIGNIFICANT CHANGE UP (ref 98–110)
CO2 SERPL-SCNC: 28 MMOL/L — SIGNIFICANT CHANGE UP (ref 17–32)
CREAT SERPL-MCNC: <0.5 MG/DL — LOW (ref 0.7–1.5)
EGFR: 101 ML/MIN/1.73M2 — SIGNIFICANT CHANGE UP
GLUCOSE SERPL-MCNC: 98 MG/DL — SIGNIFICANT CHANGE UP (ref 70–99)
HCT VFR BLD CALC: 34.2 % — LOW (ref 37–47)
HGB BLD-MCNC: 11.7 G/DL — LOW (ref 12–16)
MAGNESIUM SERPL-MCNC: 2 MG/DL — SIGNIFICANT CHANGE UP (ref 1.8–2.4)
MCHC RBC-ENTMCNC: 29.8 PG — SIGNIFICANT CHANGE UP (ref 27–31)
MCHC RBC-ENTMCNC: 34.2 G/DL — SIGNIFICANT CHANGE UP (ref 32–37)
MCV RBC AUTO: 87 FL — SIGNIFICANT CHANGE UP (ref 81–99)
NRBC # BLD: 0 /100 WBCS — SIGNIFICANT CHANGE UP (ref 0–0)
PHOSPHATE SERPL-MCNC: 4.1 MG/DL — SIGNIFICANT CHANGE UP (ref 2.1–4.9)
PLATELET # BLD AUTO: 369 K/UL — SIGNIFICANT CHANGE UP (ref 130–400)
POTASSIUM SERPL-MCNC: 4.3 MMOL/L — SIGNIFICANT CHANGE UP (ref 3.5–5)
POTASSIUM SERPL-SCNC: 4.3 MMOL/L — SIGNIFICANT CHANGE UP (ref 3.5–5)
RBC # BLD: 3.93 M/UL — LOW (ref 4.2–5.4)
RBC # FLD: 12.1 % — SIGNIFICANT CHANGE UP (ref 11.5–14.5)
SODIUM SERPL-SCNC: 138 MMOL/L — SIGNIFICANT CHANGE UP (ref 135–146)
WBC # BLD: 7.49 K/UL — SIGNIFICANT CHANGE UP (ref 4.8–10.8)
WBC # FLD AUTO: 7.49 K/UL — SIGNIFICANT CHANGE UP (ref 4.8–10.8)

## 2022-06-29 PROCEDURE — 99232 SBSQ HOSP IP/OBS MODERATE 35: CPT

## 2022-06-29 PROCEDURE — 99238 HOSP IP/OBS DSCHRG MGMT 30/<: CPT

## 2022-06-29 RX ORDER — LEVETIRACETAM 250 MG/1
500 TABLET, FILM COATED ORAL
Refills: 0 | Status: DISCONTINUED | OUTPATIENT
Start: 2022-06-29 | End: 2022-06-29

## 2022-06-29 RX ORDER — LATANOPROST 0.05 MG/ML
1 SOLUTION/ DROPS OPHTHALMIC; TOPICAL
Qty: 0 | Refills: 0 | DISCHARGE

## 2022-06-29 RX ORDER — METOPROLOL TARTRATE 50 MG
1 TABLET ORAL
Qty: 0 | Refills: 0 | DISCHARGE
Start: 2022-06-29

## 2022-06-29 RX ORDER — LEVETIRACETAM 250 MG/1
1 TABLET, FILM COATED ORAL
Qty: 0 | Refills: 0 | DISCHARGE
Start: 2022-06-29

## 2022-06-29 RX ORDER — ASPIRIN/CALCIUM CARB/MAGNESIUM 324 MG
1 TABLET ORAL
Qty: 0 | Refills: 0 | DISCHARGE
Start: 2022-06-29

## 2022-06-29 RX ADMIN — HEPARIN SODIUM 5000 UNIT(S): 5000 INJECTION INTRAVENOUS; SUBCUTANEOUS at 05:04

## 2022-06-29 RX ADMIN — Medication 12.5 MILLIGRAM(S): at 05:04

## 2022-06-29 RX ADMIN — CHLORHEXIDINE GLUCONATE 1 APPLICATION(S): 213 SOLUTION TOPICAL at 05:04

## 2022-06-29 RX ADMIN — Medication 1 PATCH: at 12:15

## 2022-06-29 RX ADMIN — Medication 81 MILLIGRAM(S): at 12:13

## 2022-06-29 RX ADMIN — FAMOTIDINE 20 MILLIGRAM(S): 10 INJECTION INTRAVENOUS at 12:14

## 2022-06-29 RX ADMIN — LEVETIRACETAM 400 MILLIGRAM(S): 250 TABLET, FILM COATED ORAL at 05:04

## 2022-06-29 NOTE — SWALLOW BEDSIDE ASSESSMENT ADULT - SLP PERTINENT HISTORY OF CURRENT PROBLEM
Pt admitted w/ dysarthria, R facial droop, RUE and RLE weakness w/ decreased po intake. CTH: hyperdensity L carotid terminus, occlusion L ICA, s/p TPA, s/p thombectomy
pt is a 80 y/o F p/w dysarthria, mild right facial droop, and RUE/RLE weakness. 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 Pt s/p tpa. No NI intervention 2' NIHSS improving from 4 --> 1. After tpa, pt became aphasic, dilated left pupil, dense right-sided hemiplegia, and decreased sensation right lower arm and RLE, however still able to follow commands on left side. Pt taken for mechanical thrombectomy. Left proximal ICA/M1 occlusion with TICI 0-2C. Post CTH showed Redemonstrated evolving acute infarct in the left basal ganglia, left frontal lobe, and left temporal lobe with hemorrhagic transformation in the left subinsular region. Transferred to Stroke Unit for further monitoring and work-up.

## 2022-06-29 NOTE — DISCHARGE NOTE PROVIDER - NSDCFUADDINST_GEN_ALL_CORE_FT
If you have problems with swallowing, you must learn to follow a special diet that makes eating safer. The signs of swallowing problems are choking or coughing when eating. Learn tips to make feeding and swallowing easier and safer.    Avoid salty and fatty foods and stay away from fast food restaurants to make your heart and blood vessels healthier.    Limit how much alcohol you drink to a maximum of 1 drink a day if you are a woman and 2 drinks a day if you are a man. Ask your provider if it is OK for you to drink alcohol.    Keep up to date with your vaccinations. Get a flu shot every year. Ask your doctor if you need a pneumonia shot.    Do not smoke. Ask your provider for help quitting if you need to. Do not let anybody smoke in your home.    Try to stay away from stressful situations. If you feel stressed all the time or feel very sad, talk with your provider.    If you feel sad or depressed at times, talk to family or friends about this. Ask your provider about seeking professional help.

## 2022-06-29 NOTE — SWALLOW BEDSIDE ASSESSMENT ADULT - PHARYNGEAL PHASE
w/ thin liquids/Cough post oral intake
1 instance of coughing w/ larger bolus/Within functional limits

## 2022-06-29 NOTE — PROGRESS NOTE ADULT - PROVIDER SPECIALTY LIST ADULT
Neurology
Neurology
Internal Medicine
Internal Medicine
Neurology
Physiatry
Physiatry
NSICU
Neurology
Internal Medicine
NSICU
Internal Medicine

## 2022-06-29 NOTE — DISCHARGE NOTE PROVIDER - NSDCMRMEDTOKEN_GEN_ALL_CORE_FT
aspirin 81 mg oral delayed release tablet: 1 tab(s) orally once a day  atorvastatin 10 mg oral tablet: 1 tab(s) orally once a day  levETIRAcetam 500 mg oral tablet: 1 tab(s) orally 2 times a day  metoprolol: 1 tab(s) orally 2 times a day

## 2022-06-29 NOTE — PROGRESS NOTE ADULT - TIME BILLING
time spent on review of labs, imaging studies, old records, obtaining history, personally examining patient, counselling and communicating with patient/ family, entering orders for medications/tests/etc, discussions with other health care providers, documentation in electronic health records, independent interpretation of labs, imaging/procedure results and care coordination.
Review of imaging and chart; obtaining history; examination of pt; discussion and coordination of care.
time spent on review of labs, imaging studies, old records, obtaining history, personally examining patient, counselling and communicating with patient/ family, entering orders for medications/tests/etc, discussions with other health care providers, documentation in electronic health records, independent interpretation of labs, imaging/procedure results and care coordination.
Review of imaging and chart; obtaining history; examination of pt; discussion and coordination of care.
time spent on review of labs, imaging studies, old records, obtaining history, personally examining patient, counselling and communicating with patient/ family, entering orders for medications/tests/etc, discussions with other health care providers, documentation in electronic health records, independent interpretation of labs, imaging/procedure results and care coordination.

## 2022-06-29 NOTE — DISCHARGE NOTE PROVIDER - NSDCCPCAREPLAN_GEN_ALL_CORE_FT
PRINCIPAL DISCHARGE DIAGNOSIS  Diagnosis: Cerebrovascular accident (CVA)  Assessment and Plan of Treatment: You were in the hospital after having a stroke. Stroke happens when blood flow to part of the brain stops.  At home follow your health care provider's instructions on self-care. Use the information below as a reminder.  When blood flow to an area of your brain stops, it's serious. It's called a stroke, and will often cause permanent, debilitating damage to your brain and change your life. Let's talk about strokes. If blood flow to your brain is stopped for longer than a few seconds, your brain can't get blood and oxygen. Brain cells die, causing permanent damage. There are two types, ischemic stroke and hemorrhagic stroke. Ischemic stroke happens when a blood clot forms in a very small artery, or when a blood clot breaks off from another artery and lodges in your brain. Hemorrhagic strokes can happen when a blood vessel in your brain becomes weak and bursts open. High blood pressure is the number one risk factor for strokes. People with atrial fibrillation, when your heart rhythm is fast and irregular, diabetes, a family history of stroke, and high cholesterol are most at risk. You are also at risk for stroke if you are older than age 55. Other risk factors include being overweight, drinking too much alcohol, eating too much salt, and smoking. Symptoms of a stroke usually develop suddenly, without warning.

## 2022-06-29 NOTE — PROGRESS NOTE ADULT - ASSESSMENT
81F presenting with dysarthria, mild right facial droop, and RUE/RLE weakness. 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 s/p tpa @ 1630, 6/22. No NI intervention 2/2 NIHSS improving from 4 --> 1. After tpa, patient became aphasic, dilated left pupil, dense right-sided hemiplegia, and decreased sensation right lower arm and RLE, however still able to follow commands on left side. Code NI reactivated. Patient taken for mechanical thrombectomy. 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 Patient extubated post procedure, noted to be improving neurologically. Repeat CTH stable, and pt taken off levophed.     #L MCA Stroke w/ Brain Compression s/p TPA and Thrombectomy c/b Acute SAH and Hemorrhagic Conversion   - likely cardioembolic due to PAF  - MRI brain: Acute left basal ganglia infarct with associated mass effect upon the adjacent left lateral ventricle and slight shift of the third and lateral ventricles to the right of midline. Small acute infarcts the left frontal, left temporal, and left  parietal regions  - repeat CTH 6/25:  Slightly decreased subarachnoid hemorrhage in the left sylvian fissure and adjacent left temporal and parietal sulci compared to the prior CT. Redemonstrated evolving acute infarct in the left basal ganglia, left frontal lobe, and left temporal lobe with hemorrhagic transformation in the left subinsular region.   - TTE nl EF  - repeat CTH w/ stable hemorrhage (last one on 6/27)  -  - 140  - Seizure precautions, Keppra 500 BID x 7 days  - Stroke core measures done   - PT/OT/Rehab  - Atorvastatin     # New Onset PAF   - tachycardic and EKG reported to be A flutter with 2:1 conduction   - received PO metoprolol and now HR improved  -A/c once ok w/ neuro (has SAH)  -cont Metoprolol for HR control    #Hx of Tobacco Use   - Nicotine patch     #HLD   - c/w atorvastatin     #Thyroid nodules  -outpt U/s     DVT ppx: currently on heparin SQ, SCDs    #Progress Note Handoff  Pending: Consults____Clinical improvement and stability__x___Tests______PT____x____  Pt/Family discussion: Pt/friend informed and agree with the current plan  Disposition: [place]__    My note supersedes the residents note should a discrepancy arise.    Chart and notes personally reviewed.  Care Discussed with Consultants/Other Providers/ Housestaff [ x] YES [ ] NO   Radiology, labs, old records personally reviewed.    discussed w/ housestaff, nursing, case management, neuro team

## 2022-06-29 NOTE — PROGRESS NOTE ADULT - SUBJECTIVE AND OBJECTIVE BOX
Neurology Progress Note    Interval History:    Patient seen and examined at bedside. There were no acute events overnight. Patient states she is doing well, denied any complaints.      PAST MEDICAL & SURGICAL HISTORY:  Hyperlipidemia      Medications:  acetaminophen     Tablet .. 650 milliGRAM(s) Oral every 6 hours PRN  aspirin enteric coated 81 milliGRAM(s) Oral daily  atorvastatin 80 milliGRAM(s) Oral at bedtime  bisacodyl Suppository 10 milliGRAM(s) Rectal at bedtime  chlorhexidine 4% Liquid 1 Application(s) Topical <User Schedule>  famotidine    Tablet 20 milliGRAM(s) Oral daily  heparin   Injectable 5000 Unit(s) SubCutaneous every 12 hours  levETIRAcetam 500 milliGRAM(s) Oral two times a day  metoprolol tartrate 12.5 milliGRAM(s) Oral two times a day  nicotine -  14 mG/24Hr(s) Patch 1 patch Transdermal daily  ondansetron Injectable 4 milliGRAM(s) IV Push once  polyethylene glycol 3350 17 Gram(s) Oral every 12 hours  senna 2 Tablet(s) Oral at bedtime      Vital Signs Last 24 Hrs  T(C): 36.2 (2022 13:18), Max: 36.8 (2022 20:15)  T(F): 97.1 (2022 13:18), Max: 98.2 (2022 20:15)  HR: 86 (2022 13:18) (68 - 86)  BP: 111/53 (2022 13:18) (111/53 - 120/47)  BP(mean): 69 (2022 23:55) (68 - 78)  RR: 18 (2022 13:18) (16 - 18)  SpO2: 94% (2022 05:13) (94% - 97%)      Neurological Examination:  General: Appearance is consistent with chronologic age.  Cognitive/Language: Awake, alert, and oriented to person, , and month. 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  Cranial Nerves  - Eyes: Visual acuity intact, Visual fields full. EOMI w/o nystagmus  - Face:  Facial sensation normal, no facial asymmetry.    - Ears/Nose/Throat:  Hearing grossly intact, tongue midline.   Motor examination: Upper Extremities: L 5/5, R 3+/5; Lower extremities: L 5/5, R 4+/5. RUE/RLE drift.  Sensory examination: Intact to light touch in B/L UE and LE.  Cerebellum: FTN impaired on right side.     NIHSS 6      Labs:  CBC Full  -  ( 2022 06:34 )  WBC Count : 7.49 K/uL  RBC Count : 3.93 M/uL  Hemoglobin : 11.7 g/dL  Hematocrit : 34.2 %  Platelet Count - Automated : 369 K/uL  Mean Cell Volume : 87.0 fL  Mean Cell Hemoglobin : 29.8 pg  Mean Cell Hemoglobin Concentration : 34.2 g/dL          138  |  99  |  9<L>  ----------------------------<  98  4.3   |  28  |  <0.5<L>    Ca    8.9      2022 06:34  Phos  4.1       Mg     2.0         TPro  6.2  /  Alb  3.6  /  TBili  0.8  /  DBili  x   /  AST  17  /  ALT  11  /  AlkPhos  50      LIVER FUNCTIONS - ( 2022 07:20 )  Alb: 3.6 g/dL / Pro: 6.2 g/dL / ALK PHOS: 50 U/L / ALT: 11 U/L / AST: 17 U/L / GGT: x

## 2022-06-29 NOTE — SWALLOW BEDSIDE ASSESSMENT ADULT - SWALLOW EVAL: DIAGNOSIS
+toleration for thin liquids and regular solids w/o overt s/s aspiration/penetration; +1 instance of coughing w/ larger bolus size of thin liquids; no further overt s/s aspiration/penetration
+resistant to po trials, +toleration of mildly thick liquids and puree w/o overt s/s of penetration/aspiration, +overt s/s of penetration/aspiration w/ thin liquids, minimal amt of soft solids accepted. +munching and w/o rotary mastication

## 2022-06-29 NOTE — DISCHARGE NOTE PROVIDER - HOSPITAL COURSE
This is a 80 yo F presenting with dysarthria, mild right facial droop, and RUE/RLE weakness. 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 s/p tpa @ 1630, 6/22. No NI intervention 2/2 NIHSS improving from 4 --> 1. After tpa, patient became aphasic, dilated left pupil, dense right-sided hemiplegia, and decreased sensation right lower arm and RLE, however still able to follow commands on left side. Patient taken for mechanical thrombectomy. Left proximal ICA/M1 occlusion with TICI 0-2C.  Post CT head showed acute SAH in the silvian fissure and left frontal/temporal/parietal sulci. Transferred to Stroke Unit for further monitoring and work-up.    Plan  Ischemic Stroke / SAH  - Neuro checks and vitals q4 hrs  - MRI Brain: Acute left basal ganglia infarct with associated mass effect upon the adjacent left lateral ventricle and slight shift of the third and lateral ventricles to the right of midline. Small acute infarcts the left frontal, left temporal, and left  parietal regions  - ILR cancelled as new onset of aflutter, EP consulted and aware. Recommends AC; however given bleed and size of stroke will hold off AC. Started ASA 6/26  - Repeat CTH (6/25): Slightly decreased subarachnoid hemorrhage in the left sylvian fissure and adjacent left temporal and parietal sulci compared to the prior CT. Redemonstrated evolving acute infarct in the left basal ganglia, left frontal lobe, and left temporal lobe with hemorrhagic transformation in the left subinsular region.   - Hold off on A/C for now  - Seizure precautions, Keppra 500 BID x 7 days  - Aspirin 81 mg daily  - Atorvastatin 80 mg qhs  - Stroke core measures done   - Repeat HCT stable, interval decrease in SAH  - PT/OT/Rehab  - Stroke education    Cardio/new onset Afib  -  - 140  - Added metoprolol 12.5 mg q12 hrs  - Continue high intensity statin  - LDL: 75  - Hold A/C for now - repeat HCT showed interval decrease in SAH     PULM: Smoker   - Nicotine patch   - HOB > 35 degrees  - Aspiration precautions  - Can d/c nasal cannula    GI  - Minced and moist diet  - Added Dulcolax for BM  - F/u repeat s/s eval    Renal/Lytes  - Keep K > 4, Mg > 2, replete PRN     Misc:  - Diet  - GI prophylaxis [X] other: H2 Blocker - on ant acid at home   - Bowel regimen [X] Miralax [X] senna [] other: Added Dulcolax  - VTE prophylaxis: [X] SCDs [X chemoprophylaxis  - PT/OT/SLP  - Dispo- family wants patient discharged to [facility]     New medications upon discharge:   Please take Aspirin 81 mg daily    Can take Keppra 500 mg po two times per day    Can take metoprolol tartrate 12.5 mg two time per day    No need for continuous telemetry monitoring in rehabilitation This is a 80 yo F presenting with dysarthria, mild right facial droop, and RUE/RLE weakness. 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 s/p tpa @ 1630, 6/22. No NI intervention 2/2 NIHSS improving from 4 --> 1. After tpa, patient became aphasic, dilated left pupil, dense right-sided hemiplegia, and decreased sensation right lower arm and RLE, however still able to follow commands on left side. Patient taken for mechanical thrombectomy. Left proximal ICA/M1 occlusion with TICI 0-2C.  Post CT head showed acute SAH in the silvian fissure and left frontal/temporal/parietal sulci. Transferred to Stroke Unit for further monitoring and work-up.    Plan  Ischemic Stroke / SAH  - Neuro checks and vitals q4 hrs  - MRI Brain: Acute left basal ganglia infarct with associated mass effect upon the adjacent left lateral ventricle and slight shift of the third and lateral ventricles to the right of midline. Small acute infarcts the left frontal, left temporal, and left  parietal regions  - ILR cancelled as new onset of aflutter, EP consulted and aware. Recommends AC; however given bleed and size of stroke will hold off AC. Started ASA 6/26  - Repeat CTH (6/25): Slightly decreased subarachnoid hemorrhage in the left sylvian fissure and adjacent left temporal and parietal sulci compared to the prior CT. Redemonstrated evolving acute infarct in the left basal ganglia, left frontal lobe, and left temporal lobe with hemorrhagic transformation in the left subinsular region.   - Hold off on A/C for now  - Seizure precautions, Keppra 500 BID x 7 days  - Aspirin 81 mg daily  - Atorvastatin 80 mg qhs  - Stroke core measures done   - Repeat HCT stable, interval decrease in SAH  - PT/OT/Rehab  - Stroke education    Cardio/new onset Afib  -  - 140  - Added metoprolol 12.5 mg q12 hrs  - Continue high intensity statin  - LDL: 75  - Hold A/C for now - repeat HCT showed interval decrease in SAH     PULM: Smoker   - Nicotine patch   - HOB > 35 degrees  - Aspiration precautions  - Can d/c nasal cannula    GI  - Minced and moist diet  - Added Dulcolax for BM  - F/u repeat s/s eval    Renal/Lytes  - Keep K > 4, Mg > 2, replete PRN     Misc:  - Diet  - GI prophylaxis [X] other: H2 Blocker - on ant acid at home   - Bowel regimen [X] Miralax [X] senna [] other: Added Dulcolax  - VTE prophylaxis: [X] SCDs [X chemoprophylaxis  - PT/OT/SLP  - Dispo- family wants patient discharged to [facility]     New medications upon discharge:   Please take Aspirin 81 mg daily    Can take Keppra 500 mg po two times per day    Can take metoprolol tartrate 12.5 mg two time per day    No need for continuous telemetry monitoring in rehabilitation      Stroke attending attestation:  Pt is a 80 yo F with PMhx of tobacco use, HLD, who presented with aphasia and right hemiparesis. S/p IV alteplase. Exam worsened, pt taken for left ICA/MCA thrombectomy, s/p EVT TICI 2c.    Impr: acute ischemic stroke of left MCA territory with SAH  new onset afib noted  Repeat CT head on 7/6, before considering starting anticoagulation for secondary stroke prevention  F/u in stroke clinic in 2-3 weeks

## 2022-06-29 NOTE — PROGRESS NOTE ADULT - ASSESSMENT
This is a 80 yo F presenting with dysarthria, mild right facial droop, and RUE/RLE weakness. 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 s/p tpa @ 1630, 6/22. No NI intervention 2/2 NIHSS improving from 4 --> 1. After tpa, patient became aphasic, dilated left pupil, dense right-sided hemiplegia, and decreased sensation right lower arm and RLE, however still able to follow commands on left side. Patient taken for mechanical thrombectomy. Left proximal ICA/M1 occlusion with TICI 0-2C.  Post CT head showed acute SAH in the silvian fissure and left frontal/temporal/parietal sulci. Transferred to Stroke Unit for further monitoring and work-up.    Plan  Ischemic Stroke / SAH  - Neuro checks and vitals q4 hrs  - MRI Brain: Acute left basal ganglia infarct with associated mass effect upon the adjacent left lateral ventricle and slight shift of the third and lateral ventricles to the right of midline. Small acute infarcts the left frontal, left temporal, and left  parietal regions  - ILR cancelled as new onset of aflutter, EP consulted and aware. Recommends AC; however given bleed and size of stroke will hold off AC. Started ASA 6/26  - Repeat CTH (6/25): Slightly decreased subarachnoid hemorrhage in the left sylvian fissure and adjacent left temporal and parietal sulci compared to the prior CT. Redemonstrated evolving acute infarct in the left basal ganglia, left frontal lobe, and left temporal lobe with hemorrhagic transformation in the left subinsular region.   - Hold off on A/C for now  - Seizure precautions, Keppra po 500 BID x 7 days  - Aspirin 81 mg daily  - Atorvastatin 80 mg qhs  - Stroke core measures done   - Repeat HCT stable, interval decrease in SAH  - PT/OT/Rehab  - Stroke education    Cardio/new onset Afib  -  - 140  - Added metoprolol 12.5 mg q12 hrs  - Continue high intensity statin  - LDL: 75  - Hold A/C for now - repeat HCT showed interval decrease in SAH   - No longer need for continuous telemetry monitoring while in rehab    PULM: Smoker   - Nicotine patch   - HOB > 35 degrees  - Aspiration precautions  - Can d/c nasal cannula    GI  - Added Dulcolax for BM  - Repeat s/s eval, advanced to DASH/TLC diet    Renal/Lytes  - Keep K > 4, Mg > 2, replete PRN     Misc:  - Diet: DASH/TLC diet  - GI prophylaxis [X] other: H2 Blocker - on ant acid at home   - Bowel regimen [X] Miralax [X] senna [] other: Added Dulcolax  - VTE prophylaxis: [X] SCDs [X chemoprophylaxis  - PT/OT/SLP  - Dispo- family wants patient discharged to [facility]

## 2022-06-29 NOTE — PROGRESS NOTE ADULT - SUBJECTIVE AND OBJECTIVE BOX
DIDI HENAO  81y, Female  Allergy: No Known Allergies    Patient seen and examined earlier today. Resting comfortably. No complaints.    HPI:  81-year-old female with PMHx HLD p/w dysarthria, mild right facial droop, and RUE/RLE weakness, LKW 1330, a/w upset stomach, nausea, and decreased PO intake, NIHSS = 4. Stroke code activated. CTH revealed hyperdensity in the left carotid terminus could reflect underlying thrombosis, atherosclerotic disease, or artifact from adjacent bony structures, with no acute territorial infarct, intracranial hemorrhage, or midline shift. 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 The gradual loss of vascular enhancement in the proximal left cervical ICA could reflect pseudoocclusion. CTP revealed delayed perfusion in the left frontotemporal regions along the MCA territory measuring 81 mL. No perfusion evidence of core infarct. Risks versus benefits of tPA discussed with daughter, tPA administered @ 1630. Code NI actual for Left ICA occlusion, cancelled 2/2 improving NIHSS. Patient examined by NCC team during tPA administration, NIHSS = 1 for notable RUE drift. Admit to NCCU for close neurological monitoring and optimization.     Interval events: At 1700, patient aphasic, dilated left pupil, dense right-sided hemiplegia, and decreased sensation right lower arm and RLE, however still able to follow commands on left side. Repeat CTH revealed no acute territorial infarct, intracranial hemorrhage, or midline shift, with retained contrast in the intracranial vessels. Code NI reactivated. Patient taken for mechanical thrombectomy.    (22 Jun 2022 17:32)    PMH/PSH:  PAST MEDICAL & SURGICAL HISTORY:  Hyperlipidemia      PHYSICAL EXAM:  GENERAL: NAD,  + mixed aphasia (expressive>receptive)  HNENT: EOMI, PERRLA    NECK: No LAD/swelling  CHEST/LUNG:  decreased BS b/l bases  HEART: RRR, No murmurs  ABDOMEN: Soft, NT, ND,  Bowel sounds present  EXTREMITIES:  Warm well perfused, no edema   NEURO: Rt 4/5, LT 5/5    tele: PAF, nonspecific changes (on my own evaluation of tele monitor)            MEDICATIONS  (STANDING):  aspirin enteric coated 81 milliGRAM(s) Oral daily  atorvastatin 80 milliGRAM(s) Oral at bedtime  bisacodyl Suppository 10 milliGRAM(s) Rectal at bedtime  chlorhexidine 4% Liquid 1 Application(s) Topical <User Schedule>  famotidine    Tablet 20 milliGRAM(s) Oral daily  heparin   Injectable 5000 Unit(s) SubCutaneous every 12 hours  levETIRAcetam 500 milliGRAM(s) Oral two times a day  metoprolol tartrate 12.5 milliGRAM(s) Oral two times a day  nicotine -  14 mG/24Hr(s) Patch 1 patch Transdermal daily  ondansetron Injectable 4 milliGRAM(s) IV Push once  polyethylene glycol 3350 17 Gram(s) Oral every 12 hours  senna 2 Tablet(s) Oral at bedtime    MEDICATIONS  (PRN):  acetaminophen     Tablet .. 650 milliGRAM(s) Oral every 6 hours PRN Mild Pain (1 - 3)    Home Medications:  aspirin 81 mg oral delayed release tablet: 1 tab(s) orally once a day (29 Jun 2022 13:45)  atorvastatin 10 mg oral tablet: 1 tab(s) orally once a day (22 Jun 2022 17:48)  levETIRAcetam 500 mg oral tablet: 1 tab(s) orally 2 times a day (29 Jun 2022 13:45)  metoprolol: 1 tab(s) orally 2 times a day (29 Jun 2022 13:45)    Vital Signs Last 24 Hrs  T(C): 36.2 (29 Jun 2022 13:18), Max: 36.8 (28 Jun 2022 20:15)  T(F): 97.1 (29 Jun 2022 13:18), Max: 98.2 (28 Jun 2022 20:15)  HR: 86 (29 Jun 2022 13:18) (68 - 86)  BP: 111/53 (29 Jun 2022 13:18) (111/53 - 120/47)  BP(mean): 69 (28 Jun 2022 23:55) (69 - 78)  RR: 18 (29 Jun 2022 13:18) (16 - 18)  SpO2: 94% (29 Jun 2022 05:13) (94% - 96%)  CAPILLARY BLOOD GLUCOSE        LABS:                        11.7   7.49  )-----------( 369      ( 29 Jun 2022 06:34 )             34.2     06-29    138  |  99  |  9<L>  ----------------------------<  98  4.3   |  28  |  <0.5<L>    Ca    8.9      29 Jun 2022 06:34  Phos  4.1     06-29  Mg     2.0     06-29    TPro  6.2  /  Alb  3.6  /  TBili  0.8  /  DBili  x   /  AST  17  /  ALT  11  /  AlkPhos  50  06-28    LIVER FUNCTIONS - ( 28 Jun 2022 07:20 )  Alb: 3.6 g/dL / Pro: 6.2 g/dL / ALK PHOS: 50 U/L / ALT: 11 U/L / AST: 17 U/L / GGT: x                           Consultant Notes Reviewed:  [x ] YES  [ ] NO  Care Discussed with Consultants/Other Providers/ Housestaff [ x] YES  [ ] NO  Radiology, labs, new studies personally reviewed.

## 2022-06-29 NOTE — DISCHARGE NOTE NURSING/CASE MANAGEMENT/SOCIAL WORK - PATIENT PORTAL LINK FT
You can access the FollowMyHealth Patient Portal offered by [facility] by registering at the following website: [email or web address]. By joining [employer]’s FollowMyHealth portal, you will also be able to view your health information using other applications (apps) compatible with our system.

## 2022-06-29 NOTE — DISCHARGE NOTE PROVIDER - CARE PROVIDER_API CALL
Marilee Maher)  Neurology  [address] [place] [address]  Phone: [phone]  Fax: [phone]  Follow Up Time:

## 2022-06-29 NOTE — PROGRESS NOTE ADULT - ATTENDING COMMENTS
I have personally seen and examined this patient.  I have fully participated in the care of this patient.  I have reviewed all pertinent clinical information, including history, physical exam, plan and note. Patient remains aphasic with right side weakness. CT head showed stable hemorrhage. Could start ASA from tomorrow. PT/OT. ILR on Monday   I have reviewed all pertinent clinical information and reviewed all relevant imaging and diagnostic studies personally.  Recommendations as above.  Agree with above assessment except as noted.
Pt is a 82 yo F with PMhx of tobacco use, HLD, who presented with aphasia and right hemiparesis. S/p IV alteplase. Exam worsened, pt taken for left ICA/MCA thrombectomy, s/p EVT TICI 2c. profound expressive aphasia on exam, right hemiparesis improved    Impr: acute ischemic stroke of left MCA territory with SAH  new onset afib noted  Dispo pending IPR acceptance at [facility] (pt's son works at [employer])  Repeat CT head on 7/6, before considering starting anticoagulation for secondary stroke prevention  tele, PT/ST/OT. discussed tobacco cessation.
PT WAS SEEN AND EXAMINED PLAN OF MANAGEMENT WAS DISCUSSED WITH THE TEAM AND BRIEFED ABOVE BY DEISY.
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  Recommendations as above.  Discussed with son who is orthopedist at [employer] and prefers pt to receive rehab at [facility].
Pt is a 80 yo F with PMhx of tobacco use, HLD, who presented with aphasia and right hemiparesis. S/p IV alteplase. Exam worsened, pt taken for left ICA/MCA thrombectomy, s/p EVT TICI 2c. profound expressive aphasia on exam, right hemiparesis improved    Impr: acute ischemic stroke of left MCA territory with SAH  new onset afib noted  Repeat CT head on 7/6, before considering starting anticoagulation for secondary stroke prevention  tele, PT/ST/OT. dispo planning

## 2022-06-29 NOTE — DISCHARGE NOTE NURSING/CASE MANAGEMENT/SOCIAL WORK - NSDCPEFALRISK_GEN_ALL_CORE
For information on Fall & Injury Prevention, visit: [email or web address] OR  [email or web address] OR  https://www.cdc.gov/steadi/patient.html

## 2022-06-29 NOTE — SWALLOW BEDSIDE ASSESSMENT ADULT - SLP GENERAL OBSERVATIONS
pt received in bed awake alert w/o c/o pain. +aphasic +room air +family at bedside
Pt received in bed asleep, woke to verbal stim, required cues to attend, +aphasic

## 2022-07-05 DIAGNOSIS — R29.810 FACIAL WEAKNESS: ICD-10-CM

## 2022-07-05 DIAGNOSIS — I60.8 OTHER NONTRAUMATIC SUBARACHNOID HEMORRHAGE: ICD-10-CM

## 2022-07-05 DIAGNOSIS — I63.032 CEREBRAL INFARCTION DUE TO THROMBOSIS OF LEFT CAROTID ARTERY: ICD-10-CM

## 2022-07-05 DIAGNOSIS — I63.9 CEREBRAL INFARCTION, UNSPECIFIED: ICD-10-CM

## 2022-07-05 DIAGNOSIS — G46.0 MIDDLE CEREBRAL ARTERY SYNDROME: ICD-10-CM

## 2022-07-05 DIAGNOSIS — I48.92 UNSPECIFIED ATRIAL FLUTTER: ICD-10-CM

## 2022-07-05 DIAGNOSIS — I10 ESSENTIAL (PRIMARY) HYPERTENSION: ICD-10-CM

## 2022-07-05 DIAGNOSIS — F17.210 NICOTINE DEPENDENCE, CIGARETTES, UNCOMPLICATED: ICD-10-CM

## 2022-07-05 DIAGNOSIS — R47.01 APHASIA: ICD-10-CM
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
## 2022-07-05 DIAGNOSIS — R29.721 NIHSS SCORE 21: ICD-10-CM

## 2022-07-05 DIAGNOSIS — G93.5 COMPRESSION OF BRAIN: ICD-10-CM

## 2022-07-05 DIAGNOSIS — I67.1 CEREBRAL ANEURYSM, NONRUPTURED: ICD-10-CM

## 2022-07-05 DIAGNOSIS — E78.5 HYPERLIPIDEMIA, UNSPECIFIED: ICD-10-CM

## 2022-07-05 DIAGNOSIS — G97.51 POSTPROCEDURAL HEMORRHAGE OF A NERVOUS SYSTEM ORGAN OR STRUCTURE FOLLOWING A NERVOUS SYSTEM PROCEDURE: ICD-10-CM

## 2022-07-05 DIAGNOSIS — I48.0 PAROXYSMAL ATRIAL FIBRILLATION: ICD-10-CM

## 2022-07-05 DIAGNOSIS — I61.8 OTHER NONTRAUMATIC INTRACEREBRAL HEMORRHAGE: ICD-10-CM

## 2022-07-05 DIAGNOSIS — G81.91 HEMIPLEGIA, UNSPECIFIED AFFECTING RIGHT DOMINANT SIDE: ICD-10-CM

## 2022-07-05 DIAGNOSIS — R47.1 DYSARTHRIA AND ANARTHRIA: ICD-10-CM
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
## 2022-07-05 DIAGNOSIS — H40.9 UNSPECIFIED GLAUCOMA: ICD-10-CM

## 2022-08-02 PROBLEM — Z00.00 ENCOUNTER FOR PREVENTIVE HEALTH EXAMINATION: Status: ACTIVE | Noted: 2022-08-02

## 2022-08-02 PROBLEM — E78.5 HYPERLIPIDEMIA, UNSPECIFIED: Chronic | Status: ACTIVE | Noted: 2022-06-22

## 2022-08-28 RX ORDER — METOPROLOL TARTRATE 25 MG/1
25 TABLET, FILM COATED ORAL
Qty: 90 | Refills: 3 | Status: ACTIVE | COMMUNITY

## 2022-08-30 NOTE — PROGRESS NOTE ADULT - TIME-BASED BILLING (NON-CRITICAL CARE)
Time-based billing (NON-critical care)
[facility]

## 2022-08-31 NOTE — PHYSICAL EXAM
[Alert] : alert [No Acute Distress] : in no acute distress [Sclera] : the sclera and conjunctiva were normal [Normal Outer Ear/Nose] : the ears and nose were normal in appearance [Normal Appearance] : the appearance of the neck was normal [Supple] : the neck was supple [No Respiratory Distress] : no respiratory distress [No Acc Muscle Use] : no accessory muscle use [Respiration, Rhythm And Depth] : normal respiratory rhythm and effort [Auscultation Breath Sounds / Voice Sounds] : lungs were clear to auscultation bilaterally [Edema] : edema was not present [Abdomen Tenderness] : non-tender [Abdomen Soft] : soft [No Spinal Tenderness] : no spinal tenderness [No Clubbing, Cyanosis] : no clubbing or cyanosis of the fingernails [Involuntary Movements] : no involuntary movements were seen [] : no rash [Skin Lesions] : no skin lesions [Oriented To Time, Place, And Person] : oriented to person, place, and time [de-identified] : + right sided weakness

## 2022-08-31 NOTE — HISTORY OF PRESENT ILLNESS
[FreeTextEntry1] : Patient scheduled for home visit but cancelled.  Assessment is pre-visit research done and will remain in the event there is another visit.  The plan is preliminary, was not implemented nor discussed

## 2022-08-31 NOTE — ASSESSMENT
[FreeTextEntry1] : ***PRELIM ASSESSMENT***\par \par Patient seen and examined at home.  Patient is homebound 2/2 CVA with residual right sided weakness.  82F w/PMHx of HLD recent L MCA CVA w/brain compression s/p TPA and Thrombectomy c/b acute SAH and Hemorrhagic conversion, new onset PAF seen for initial home visit.  Patient admitted 6/22-6/29 for above.  Patient presented with dysarthria and right sided weakness, received TPA and then developed worsneing right sided weakness.  Taken for thrombectomy and SAH noted.  Patient also developed PAF, did not receive AC 2/2 SAH, only received ASA.  Started on Keppra for Seizure PPx.  Patient discharged to rehab.  \par \par #L MCA CVA w/brain compression s/p TPA and Thrombectomy c/b acute SAH and Hemorrhagic conversion\par - c/w Keppra for Sz PPx\par - c/w statin\par - outpatient Neuro FU as scheduled\par - PT/OT\par - DME for ambulation\par - Minced/Moist diet, w/liquids????\par \par #Paroxysmal AF\par - + rate control\par - c/w BB\par - AC per Neuro give Hx of SAH, c/w ASA for now\par \par #HLD\par - c/w Atorvastatin\par \par

## 2022-09-01 ENCOUNTER — APPOINTMENT (OUTPATIENT)
Dept: GERIATRICS | Facility: HOME HEALTH | Age: 82
End: 2022-09-01

## 2022-09-11 PROBLEM — Z23 ENCOUNTER FOR VACCINATION: Status: ACTIVE | Noted: 2022-09-11

## 2022-09-11 PROBLEM — I48.0 PAROXYSMAL ATRIAL FIBRILLATION: Status: ACTIVE | Noted: 2022-08-28

## 2022-09-11 PROBLEM — I60.9 SUBARACHNOID HEMORRHAGE, NONTRAUMATIC: Status: ACTIVE | Noted: 2022-08-28

## 2022-09-11 PROBLEM — E78.5 HYPERLIPIDEMIA, UNSPECIFIED HYPERLIPIDEMIA TYPE: Status: ACTIVE | Noted: 2022-08-28

## 2022-09-15 ENCOUNTER — APPOINTMENT (OUTPATIENT)
Dept: GERIATRICS | Facility: HOME HEALTH | Age: 82
End: 2022-09-15

## 2022-09-15 VITALS
SYSTOLIC BLOOD PRESSURE: 108 MMHG | TEMPERATURE: 98.2 F | OXYGEN SATURATION: 98 % | HEART RATE: 80 BPM | DIASTOLIC BLOOD PRESSURE: 52 MMHG | RESPIRATION RATE: 16 BRPM

## 2022-09-15 DIAGNOSIS — E78.5 HYPERLIPIDEMIA, UNSPECIFIED: ICD-10-CM

## 2022-09-15 DIAGNOSIS — I60.9 NONTRAUMATIC SUBARACHNOID HEMORRHAGE, UNSPECIFIED: ICD-10-CM

## 2022-09-15 DIAGNOSIS — Z23 ENCOUNTER FOR IMMUNIZATION: ICD-10-CM

## 2022-09-15 DIAGNOSIS — I48.0 PAROXYSMAL ATRIAL FIBRILLATION: ICD-10-CM

## 2022-09-15 PROCEDURE — 99342 HOME/RES VST NEW LOW MDM 30: CPT

## 2022-09-15 RX ORDER — LATANOPROST/PF 0.005 %
0.01 DROPS OPHTHALMIC (EYE)
Refills: 0 | Status: ACTIVE | COMMUNITY

## 2022-09-15 RX ORDER — TIMOLOL MALEATE 5 MG/ML
0.5 SOLUTION OPHTHALMIC
Refills: 0 | Status: ACTIVE | COMMUNITY

## 2022-09-15 RX ORDER — LEVETIRACETAM 500 MG/1
500 TABLET, FILM COATED ORAL TWICE DAILY
Refills: 0 | Status: DISCONTINUED | COMMUNITY
End: 2022-09-15

## 2022-09-15 RX ORDER — ASPIRIN 81 MG/1
81 TABLET, CHEWABLE ORAL
Refills: 0 | Status: DISCONTINUED | COMMUNITY
End: 2022-09-15

## 2022-09-15 NOTE — PHYSICAL EXAM
[Alert] : alert [Sclera] : the sclera and conjunctiva were normal [Normal Outer Ear/Nose] : the ears and nose were normal in appearance [Normal Appearance] : the appearance of the neck was normal [Supple] : the neck was supple [No Respiratory Distress] : no respiratory distress [No Acc Muscle Use] : no accessory muscle use [Respiration, Rhythm And Depth] : normal respiratory rhythm and effort [Auscultation Breath Sounds / Voice Sounds] : lungs were clear to auscultation bilaterally [Edema] : edema was not present [Abdomen Tenderness] : non-tender [Abdomen Soft] : soft [No Spinal Tenderness] : no spinal tenderness [No Clubbing, Cyanosis] : no clubbing or cyanosis of the fingernails [Involuntary Movements] : no involuntary movements were seen [] : no rash [Skin Lesions] : no skin lesions [Oriented To Time, Place, And Person] : oriented to person, place, and time [de-identified] : + right sided weakness

## 2022-09-15 NOTE — ASSESSMENT
[FreeTextEntry1] : #L MCA CVA w/brain compression s/p TPA and Thrombectomy c/b acute SAH and Hemorrhagic conversion\par - c/w Keppra for Sz PPx, Dtr reports was stopped at [facility]\par - c/w statin, off ASA\par - outpatient Neuro FU as scheduled\par - c/w PT/OT\par - DME for ambulation\par - on regular diet\par \par #Paroxysmal AF\par - + rate control\par - c/w BB\par - started on DOAC by [facility]\par - outpatient Cards FU as scheduled\par \par #HLD\par - c/w Atorvastatin\par - DASH diet\par \par #Mood Disorder/Depression\par - c/w Remeron 7.5mg PO QHS

## 2022-09-15 NOTE — HISTORY OF PRESENT ILLNESS
[FreeTextEntry1] : Patient seen and examined at home. Patient is homebound 2/2 CVA with residual right sided weakness. 82F w/PMHx of HLD recent L MCA CVA w/brain compression s/p TPA and Thrombectomy c/b acute SAH and Hemorrhagic conversion, new onset PAF seen for initial home visit. Patient admitted 6/22-6/29 for above. Patient presented with dysarthria and right sided weakness, received TPA and then developed worsening right sided weakness. Taken for thrombectomy and SAH noted. Patient also developed PAF, did not receive AC 2/2 SAH, only received ASA. Started on Keppra for Seizure PPx. Patient was transferred to [facility], Keppra stopped and sent to [facility].  Patient recently discharged home.  Doing well since discharge.  No acute complaints.  No CP/SOB. No abdominal complaints with good appetite and regular BM's.  No urinary complaints.  No recent falls.  No skin wounds.  Patient lives with family.  Patient uses a walker.  Has HHA 24/7.  \par

## 2022-09-26 DIAGNOSIS — N39.0 URINARY TRACT INFECTION, SITE NOT SPECIFIED: ICD-10-CM

## 2022-09-26 RX ORDER — APIXABAN 5 MG/1
5 TABLET, FILM COATED ORAL
Qty: 90 | Refills: 3 | Status: ACTIVE | COMMUNITY
Start: 1900-01-01 | End: 1900-01-01

## 2022-11-18 ENCOUNTER — OUTPATIENT (OUTPATIENT)
Dept: OUTPATIENT SERVICES | Facility: HOSPITAL | Age: 82
LOS: 1 days | Discharge: HOME | End: 2022-11-18

## 2022-11-18 PROCEDURE — 93970 EXTREMITY STUDY: CPT | Mod: 26

## 2022-11-28 DIAGNOSIS — M79.89 OTHER SPECIFIED SOFT TISSUE DISORDERS: ICD-10-CM

## 2023-02-03 ENCOUNTER — APPOINTMENT (OUTPATIENT)
Dept: CARDIOLOGY | Facility: CLINIC | Age: 83
End: 2023-02-03

## 2023-02-05 ENCOUNTER — APPOINTMENT (OUTPATIENT)
Dept: GERIATRICS | Facility: HOME HEALTH | Age: 83
End: 2023-02-05
Payer: MEDICARE

## 2023-02-05 PROCEDURE — 99441: CPT | Mod: 95

## 2023-02-08 ENCOUNTER — APPOINTMENT (OUTPATIENT)
Dept: NEUROLOGY | Facility: CLINIC | Age: 83
End: 2023-02-08

## 2023-02-26 NOTE — PHYSICAL THERAPY INITIAL EVALUATION ADULT - IMPAIRED TRANSFERS: SIT/STAND, REHAB EVAL
Physical therapy
ataxic/impaired balance/cognition/impaired coordination/decreased flexibility/impaired motor control/impaired postural control/decreased ROM/decreased strength

## 2023-03-21 ENCOUNTER — EMERGENCY (EMERGENCY)
Facility: HOSPITAL | Age: 83
LOS: 0 days | Discharge: HOME | End: 2023-03-22
Attending: EMERGENCY MEDICINE
Payer: MEDICARE

## 2023-03-21 VITALS — WEIGHT: 151.9 LBS

## 2023-03-21 DIAGNOSIS — E78.5 HYPERLIPIDEMIA, UNSPECIFIED: ICD-10-CM

## 2023-03-21 DIAGNOSIS — Z79.01 LONG TERM (CURRENT) USE OF ANTICOAGULANTS: ICD-10-CM

## 2023-03-21 DIAGNOSIS — Z87.891 PERSONAL HISTORY OF NICOTINE DEPENDENCE: ICD-10-CM

## 2023-03-21 DIAGNOSIS — R29.898 OTHER SYMPTOMS AND SIGNS INVOLVING THE MUSCULOSKELETAL SYSTEM: ICD-10-CM

## 2023-03-21 DIAGNOSIS — U07.1 COVID-19: ICD-10-CM

## 2023-03-21 DIAGNOSIS — I67.1 CEREBRAL ANEURYSM, NONRUPTURED: ICD-10-CM

## 2023-03-21 DIAGNOSIS — R53.1 WEAKNESS: ICD-10-CM

## 2023-03-21 DIAGNOSIS — R29.810 FACIAL WEAKNESS: ICD-10-CM

## 2023-03-21 DIAGNOSIS — Z79.82 LONG TERM (CURRENT) USE OF ASPIRIN: ICD-10-CM

## 2023-03-21 DIAGNOSIS — R47.1 DYSARTHRIA AND ANARTHRIA: ICD-10-CM

## 2023-03-21 LAB
ALBUMIN SERPL ELPH-MCNC: 4.2 G/DL — SIGNIFICANT CHANGE UP (ref 3.5–5.2)
ALP SERPL-CCNC: 99 U/L — SIGNIFICANT CHANGE UP (ref 30–115)
ALT FLD-CCNC: 41 U/L — SIGNIFICANT CHANGE UP (ref 0–41)
ANION GAP SERPL CALC-SCNC: 11 MMOL/L — SIGNIFICANT CHANGE UP (ref 7–14)
APTT BLD: 34.9 SEC — SIGNIFICANT CHANGE UP (ref 27–39.2)
AST SERPL-CCNC: 32 U/L — SIGNIFICANT CHANGE UP (ref 0–41)
BASOPHILS # BLD AUTO: 0.02 K/UL — SIGNIFICANT CHANGE UP (ref 0–0.2)
BASOPHILS NFR BLD AUTO: 0.2 % — SIGNIFICANT CHANGE UP (ref 0–1)
BILIRUB SERPL-MCNC: 1.4 MG/DL — HIGH (ref 0.2–1.2)
BUN SERPL-MCNC: 14 MG/DL — SIGNIFICANT CHANGE UP (ref 10–20)
CALCIUM SERPL-MCNC: 9.3 MG/DL — SIGNIFICANT CHANGE UP (ref 8.4–10.5)
CHLORIDE SERPL-SCNC: 100 MMOL/L — SIGNIFICANT CHANGE UP (ref 98–110)
CO2 SERPL-SCNC: 27 MMOL/L — SIGNIFICANT CHANGE UP (ref 17–32)
CREAT SERPL-MCNC: 0.5 MG/DL — LOW (ref 0.7–1.5)
EGFR: 95 ML/MIN/1.73M2 — SIGNIFICANT CHANGE UP
EOSINOPHIL # BLD AUTO: 0.02 K/UL — SIGNIFICANT CHANGE UP (ref 0–0.7)
EOSINOPHIL NFR BLD AUTO: 0.2 % — SIGNIFICANT CHANGE UP (ref 0–8)
GLUCOSE SERPL-MCNC: 135 MG/DL — HIGH (ref 70–99)
HCT VFR BLD CALC: 39.2 % — SIGNIFICANT CHANGE UP (ref 37–47)
HGB BLD-MCNC: 13.1 G/DL — SIGNIFICANT CHANGE UP (ref 12–16)
IMM GRANULOCYTES NFR BLD AUTO: 0.2 % — SIGNIFICANT CHANGE UP (ref 0.1–0.3)
INR BLD: 1.62 RATIO — HIGH (ref 0.65–1.3)
LYMPHOCYTES # BLD AUTO: 0.51 K/UL — LOW (ref 1.2–3.4)
LYMPHOCYTES # BLD AUTO: 5 % — LOW (ref 20.5–51.1)
MCHC RBC-ENTMCNC: 29.8 PG — SIGNIFICANT CHANGE UP (ref 27–31)
MCHC RBC-ENTMCNC: 33.4 G/DL — SIGNIFICANT CHANGE UP (ref 32–37)
MCV RBC AUTO: 89.1 FL — SIGNIFICANT CHANGE UP (ref 81–99)
MONOCYTES # BLD AUTO: 0.76 K/UL — HIGH (ref 0.1–0.6)
MONOCYTES NFR BLD AUTO: 7.5 % — SIGNIFICANT CHANGE UP (ref 1.7–9.3)
NEUTROPHILS # BLD AUTO: 8.79 K/UL — HIGH (ref 1.4–6.5)
NEUTROPHILS NFR BLD AUTO: 86.9 % — HIGH (ref 42.2–75.2)
NRBC # BLD: 0 /100 WBCS — SIGNIFICANT CHANGE UP (ref 0–0)
PLATELET # BLD AUTO: 256 K/UL — SIGNIFICANT CHANGE UP (ref 130–400)
POTASSIUM SERPL-MCNC: 4.4 MMOL/L — SIGNIFICANT CHANGE UP (ref 3.5–5)
POTASSIUM SERPL-SCNC: 4.4 MMOL/L — SIGNIFICANT CHANGE UP (ref 3.5–5)
PROT SERPL-MCNC: 7 G/DL — SIGNIFICANT CHANGE UP (ref 6–8)
PROTHROM AB SERPL-ACNC: 18.7 SEC — HIGH (ref 9.95–12.87)
RBC # BLD: 4.4 M/UL — SIGNIFICANT CHANGE UP (ref 4.2–5.4)
RBC # FLD: 13.2 % — SIGNIFICANT CHANGE UP (ref 11.5–14.5)
SARS-COV-2 RNA SPEC QL NAA+PROBE: DETECTED
SODIUM SERPL-SCNC: 138 MMOL/L — SIGNIFICANT CHANGE UP (ref 135–146)
TROPONIN T SERPL-MCNC: <0.01 NG/ML — SIGNIFICANT CHANGE UP
WBC # BLD: 10.12 K/UL — SIGNIFICANT CHANGE UP (ref 4.8–10.8)
WBC # FLD AUTO: 10.12 K/UL — SIGNIFICANT CHANGE UP (ref 4.8–10.8)

## 2023-03-21 PROCEDURE — 85025 COMPLETE CBC W/AUTO DIFF WBC: CPT

## 2023-03-21 PROCEDURE — 70498 CT ANGIOGRAPHY NECK: CPT | Mod: MA

## 2023-03-21 PROCEDURE — 85610 PROTHROMBIN TIME: CPT

## 2023-03-21 PROCEDURE — G0378: CPT

## 2023-03-21 PROCEDURE — 99285 EMERGENCY DEPT VISIT HI MDM: CPT | Mod: 25

## 2023-03-21 PROCEDURE — 70498 CT ANGIOGRAPHY NECK: CPT | Mod: 26,MA

## 2023-03-21 PROCEDURE — 70496 CT ANGIOGRAPHY HEAD: CPT | Mod: 26,MA

## 2023-03-21 PROCEDURE — 93010 ELECTROCARDIOGRAM REPORT: CPT

## 2023-03-21 PROCEDURE — 0042T: CPT | Mod: MA

## 2023-03-21 PROCEDURE — 99283 EMERGENCY DEPT VISIT LOW MDM: CPT

## 2023-03-21 PROCEDURE — 80053 COMPREHEN METABOLIC PANEL: CPT

## 2023-03-21 PROCEDURE — 70551 MRI BRAIN STEM W/O DYE: CPT | Mod: MA

## 2023-03-21 PROCEDURE — 93005 ELECTROCARDIOGRAM TRACING: CPT

## 2023-03-21 PROCEDURE — U0003: CPT

## 2023-03-21 PROCEDURE — U0005: CPT

## 2023-03-21 PROCEDURE — 36415 COLL VENOUS BLD VENIPUNCTURE: CPT

## 2023-03-21 PROCEDURE — 85730 THROMBOPLASTIN TIME PARTIAL: CPT

## 2023-03-21 PROCEDURE — 70496 CT ANGIOGRAPHY HEAD: CPT | Mod: MA

## 2023-03-21 PROCEDURE — 82962 GLUCOSE BLOOD TEST: CPT

## 2023-03-21 PROCEDURE — 70450 CT HEAD/BRAIN W/O DYE: CPT | Mod: XU,MA

## 2023-03-21 PROCEDURE — 84484 ASSAY OF TROPONIN QUANT: CPT

## 2023-03-21 PROCEDURE — 36000 PLACE NEEDLE IN VEIN: CPT | Mod: XU

## 2023-03-21 PROCEDURE — 99223 1ST HOSP IP/OBS HIGH 75: CPT | Mod: FS,CS

## 2023-03-21 RX ORDER — IBUPROFEN 200 MG
600 TABLET ORAL ONCE
Refills: 0 | Status: COMPLETED | OUTPATIENT
Start: 2023-03-21 | End: 2023-03-21

## 2023-03-21 RX ORDER — APIXABAN 2.5 MG/1
5 TABLET, FILM COATED ORAL
Refills: 0 | Status: DISCONTINUED | OUTPATIENT
Start: 2023-03-21 | End: 2023-03-22

## 2023-03-21 RX ORDER — TIMOLOL 0.5 %
1 DROPS OPHTHALMIC (EYE) ONCE
Refills: 0 | Status: DISCONTINUED | OUTPATIENT
Start: 2023-03-21 | End: 2023-03-22

## 2023-03-21 RX ORDER — ATORVASTATIN CALCIUM 80 MG/1
1 TABLET, FILM COATED ORAL
Qty: 0 | Refills: 0 | DISCHARGE

## 2023-03-21 RX ORDER — SODIUM CHLORIDE 9 MG/ML
1000 INJECTION, SOLUTION INTRAVENOUS ONCE
Refills: 0 | Status: COMPLETED | OUTPATIENT
Start: 2023-03-21 | End: 2023-03-21

## 2023-03-21 RX ORDER — DORZOLAMIDE HYDROCHLORIDE TIMOLOL MALEATE 20; 5 MG/ML; MG/ML
1 SOLUTION/ DROPS OPHTHALMIC
Refills: 0 | Status: DISCONTINUED | OUTPATIENT
Start: 2023-03-21 | End: 2023-03-21

## 2023-03-21 RX ORDER — DORZOLAMIDE HYDROCHLORIDE 20 MG/ML
1 SOLUTION/ DROPS OPHTHALMIC ONCE
Refills: 0 | Status: DISCONTINUED | OUTPATIENT
Start: 2023-03-21 | End: 2023-03-22

## 2023-03-21 RX ORDER — LATANOPROST 0.05 MG/ML
1 SOLUTION/ DROPS OPHTHALMIC; TOPICAL AT BEDTIME
Refills: 0 | Status: DISCONTINUED | OUTPATIENT
Start: 2023-03-21 | End: 2023-03-22

## 2023-03-21 RX ORDER — ATORVASTATIN CALCIUM 80 MG/1
80 TABLET, FILM COATED ORAL AT BEDTIME
Refills: 0 | Status: DISCONTINUED | OUTPATIENT
Start: 2023-03-21 | End: 2023-03-22

## 2023-03-21 RX ORDER — METOPROLOL TARTRATE 50 MG
12.5 TABLET ORAL
Refills: 0 | Status: DISCONTINUED | OUTPATIENT
Start: 2023-03-21 | End: 2023-03-22

## 2023-03-21 RX ADMIN — Medication 600 MILLIGRAM(S): at 20:00

## 2023-03-21 RX ADMIN — SODIUM CHLORIDE 1000 MILLILITER(S): 9 INJECTION, SOLUTION INTRAVENOUS at 12:01

## 2023-03-21 RX ADMIN — Medication 600 MILLIGRAM(S): at 21:16

## 2023-03-21 NOTE — ED CDU PROVIDER INITIAL DAY NOTE - OBJECTIVE STATEMENT
Patient is an 83yo female hx of HLD p/w dysarthria, mild right facial droop, and RUE/RLE weakness presenting for Evaluation of worsening right-sided weakness. Patient is on Eliquis. Patient's home health aide states that at 4 AM she had a bowel movement in which she strained and became weak. Patient needed to be lowered to the floor slowly by the aide, did not fall, no head trauma, no LOC, no nausea, no vomiting.  Patient then went to bed and this morning he states she seemed weaker than usual on the right side and facial droop looked worse than usual

## 2023-03-21 NOTE — ED CDU PROVIDER INITIAL DAY NOTE - CLINICAL SUMMARY MEDICAL DECISION MAKING FREE TEXT BOX
Patient presented with stroke-like symptoms as documented. Initial stroke work up in ED negative - placed in obs per neuro recs for MRI. NAD at this time, agreeable with plan. Will re-eval.

## 2023-03-21 NOTE — ED ADULT NURSE NOTE - OBJECTIVE STATEMENT
Arrived as a pre note for stroke for right sided facial droop ,patient had a history of stroke . Alert,oriented

## 2023-03-21 NOTE — CONSULT NOTE ADULT - ASSESSMENT
Assessment/Plan:    82y Female with PMHx of HLD, left MCA stroke in 6/2022 with residual right sided weakness and aphasia, walks with walker with assistance at baseline who presented to the ED due to a presyncopal episode that occurred about she was trying to have a bowel movement this morning at 4:00am & 8:30am prenotification stroke code was activated in the ED.   CTH negative for acute hemorrhage or transcortical infarction CTA with interval patency of previously occluded left ICA since prior CTA form 6/22/22. Tenecteplase was not administered as patient was back to baseline.   Sx likely secondary to syncopal episode/vasovagal which could acutely temporarily worsen baseline residual deficits from previous stroke. Unlikely new stroke, however, recommend observation in the ED.    Recommendations  - observation  - MRI without contrast to rule out extension of prior stroke  - metabolic/infectious w/u in the ED  - syncope w/u in ED  - follow up with Stroke Clinic, Dr. Marilee Maher

## 2023-03-21 NOTE — ED PROVIDER NOTE - CLINICAL SUMMARY MEDICAL DECISION MAKING FREE TEXT BOX
82-year-old female past medical history as documented presented with right-sided weakness and dysarthria since 4 AM as per home health aide.  Stroke alert activated on arrival.  All labs reviewed.  EKG with A-fib and no ischemia.  CT scans with no acute ischemic event.  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  Patient evaluated by neurology and neuro neurology recommended ED OU for MRI brain.  Patient transferred to ED OU.

## 2023-03-21 NOTE — ED CDU PROVIDER INITIAL DAY NOTE - PHYSICAL EXAMINATION
CONST: Well appearing in NAD  EYES: PERRL, EOMI, Sclera and conjunctiva clear.   ENT: No nasal discharge. TM's clear B/L without drainage. Oropharynx normal appearing, no erythema or exudates. Uvula midline.  NECK: Non-tender, no meningeal signs  CARD: Normal S1 S2; Normal rate and rhythm  RESP: Equal BS B/L, No wheezes, rhonchi or rales. No distress  GI: Soft, non-tender, non-distended.  MS: Normal ROM in all extremities. No midline spinal tenderness.  SKIN: Warm, dry, no acute rashes. Good turgor  NEURO: A&Ox3, No focal deficits. Strength 5/5 with no sensory deficits ALEX and LLE; 3/5 strength RUE and RLE. Steady gait

## 2023-03-21 NOTE — ED PROVIDER NOTE - PROGRESS NOTE DETAILS
MS- Dr. Maher recommends placing patient in observation for MRI Brain. MS–Dr. Mills spoke to neurology who are aware of CT findings of 0.2 cm aneurysm.  Neurology recommended patient be placed in observation and have an MRI brain. Dr. Maher aware of aneurysm, patient can continue her anticoagulation.  Will follow-up Dr. Fontenot as an outpatient after discharge. Will place in ED.

## 2023-03-21 NOTE — ED ADULT TRIAGE NOTE - CHIEF COMPLAINT QUOTE
Patient STROKE PRENOTE - last known well was @ 4am; right sided facial droop noticed by patient's aide. As per daughter, patient has a hx of CVA

## 2023-03-21 NOTE — ED PROVIDER NOTE - OBJECTIVE STATEMENT
Patient is an 83yo female hx of HLD p/w dysarthria, mild right facial droop, and RUE/RLE weakness presenting for Patient is an 81yo female hx of HLD p/w dysarthria, mild right facial droop, and RUE/RLE weakness presenting for Evaluation of worsening right-sided weakness. Patient is on Eliquis. Patient's home health aide states that at 4 AM she had a bowel movement in which she strained and became weak. Patient needed to be lowered to the floor slowly by the aide, did not fall, no head trauma, no LOC, no nausea, no vomiting.  Patient then went to bed and this morning he states she seemed weaker than usual on the right side and facial droop looked worse than usual.

## 2023-03-21 NOTE — CONSULT NOTE ADULT - NS ATTEND AMEND GEN_ALL_CORE FT
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 NIHSS 8, pt is back tp her baseline. CT head without acute process. Reasonable to obtain MRI brain to r/o interval ischemia. Continue home AC/statin. R/o toxic-metabolic etiology as per primary team.

## 2023-03-21 NOTE — ED PROVIDER NOTE - WET READ LAUNCH FT
Results   MA [facility] SCREEN [facility] W NIMCO W CAD   04 Aug 2017 09:05 AM  -   [facility] SCREEN [facility] W [facility]IMCO W CAD  Accession #     [id number]     #[id number] - MA [facility] SCREEN [facility] W [facility]IMCO W CAD     BILATERAL DIGITAL SCREENING MAMMOGRAM 3D/2D WITH CAD: 8/4/2017     CLINICAL HISTORY:Routine Screening.     COMPARISON:   Comparison is made to exams dated:  12/4/2015 mammogram and 8/13/2014 mammogram - [facility].     FINDINGS:  There are scattered fibroglandular elements in both breasts.     There is a benign density in the left breast.     No significant masses, calcifications, or other findings are seen in either breast.  Current study was also evaluated with a Computer Aided Detection (CAD) system. Digital Breast   Tomosynthesis (DBT) images were obtained and used to assist in the interpretation of this   examination.  There has been no significant interval change.     IMPRESSION: BENIGN     There is no mammographic evidence of malignancy. A 1 year screening mammogram is recommended.     MAMMOGRAPHY BI-RADS: 2 BENIGN     Gary Zarate MD  kl/penrad:8/4/2017 10:42:19     Imaging Technologist: Lashonda Jimenez, RT(R)(M), [facility] Read  letter sent: Normal Single Exam  V76.12      ****  FINAL  ****     Dictated By:     GARY TRAVIS MD     Electronically Reviewed and Approved By:    GARY TRAVIS MD.  Discussed   Normal mammogram.   There are no Wet Read(s) to document.

## 2023-03-21 NOTE — CONSULT NOTE ADULT - SUBJECTIVE AND OBJECTIVE BOX
**STROKE CODE CONSULT NOTE**    Last known well time: 8:00am    HPI: 82y Female with PMHx of HLD, left MCA stroke in 6/2022 with residual right sided weakness and aphasia, walks with walker with assistance at baseline who presented to the ED due to a presyncopal episode that occurred about she was trying to have a bowel movement this morning at 4:00am, prenotification stroke code was activated in the ED.   Patient woke up feeling normal at 4am and aid assisting her to use the bathroom. Patient was straining on the toilet trying to have a bowel movement and when she got up and started washing her hands, the aid noticed that she became diaphoretic, pallor, and dizzy as if she was going to fall, therefore, lowered herself to the ground. The aid stated that she noticed she may have had a worsened right facial asymmetry however, unclear. The patient's symptoms improved and then she went back to bed. When she woke up around 8:00am she felt normal, however, she then had another similar episode after using the bathroom, which improved, therefore, she was brought to the hospital for further evaluation. Back to baseline in the ED.  Daughter at bedside states that she had alteplase and thrombectomy with small ICH during her previous admission. States that she is currently at her neurological baseline with right arm and leg weakness, mild aphasia and right facial asymmetry.   The patient states that she is feeling much better and that she is currently at her baseline. Denies worsened aphasia, dizziness, headache, chest pain, shortness of breath, worsened unilateral weakness.     T(C): 36.2 (03-21-23 @ 11:32), Max: 36.4 (03-21-23 @ 11:11)  HR: 106 (03-21-23 @ 11:32) (106 - 106)  BP: 134/60 (03-21-23 @ 11:32) (134/60 - 134/60)  RR: 18 (03-21-23 @ 11:11) (18 - 18)  SpO2: 98% (03-21-23 @ 11:32) (98% - 98%)    PAST MEDICAL & SURGICAL HISTORY:  Hyperlipidemia    Left MCA stroke 2022        FAMILY HISTORY:      SOCIAL HISTORY:  denies smoking    ROS:   as per  HPI    MEDICATIONS  (STANDING):    MEDICATIONS  (PRN):    Allergies    No Known Allergies    Intolerances      Vital Signs Last 24 Hrs  T(C): 36.2 (21 Mar 2023 11:32), Max: 36.4 (21 Mar 2023 11:11)  T(F): 97.1 (21 Mar 2023 11:32), Max: 97.5 (21 Mar 2023 11:11)  HR: 106 (21 Mar 2023 11:32) (106 - 106)  BP: 134/60 (21 Mar 2023 11:32) (134/60 - 134/60)  BP(mean): 86 (21 Mar 2023 11:32) (86 - 86)  RR: 18 (21 Mar 2023 11:11) (18 - 18)  SpO2: 98% (21 Mar 2023 11:32) (98% - 98%)    Parameters below as of 21 Mar 2023 11:32  Patient On (Oxygen Delivery Method): room air        Physical exam:  General: No acute distress, awake and alert    Neurologic:  -Mental status: Awake, alert, oriented to person, place, and time. Some loss of fluency with speech.  Intact naming, repetition, and comprehension, mild dysarthria. Follows commands.  -Cranial nerves:   II: Visual fields are full to confrontation.  III, IV, VI: Extraocular movements are intact without nystagmus.  V:  Facial sensation V1-V3 equal and intact   VII: right nasolabial fold flattening (baseline)  Motor: RUE 3/5, RLE 3/5 (baseline).  LLE 5/5, LUE 5/5.  Sensation: decreased sensation on right compared to left (baseline)   Coordination: No dysmetria on finger-to-nose on left, difficult to assess on right due to weakness  Reflexes: Downgoing toes bilaterally   Gait: deferred    NIHSS: 8 (all baseline from previous stroke)  ASPECTS Score: 8    Fingerstick Blood Glucose: CAPILLARY BLOOD GLUCOSE  122 (21 Mar 2023 12:02)      POCT Blood Glucose.: 122 mg/dL (21 Mar 2023 11:29)    LABS:                        13.1   10.12 )-----------( 256      ( 21 Mar 2023 11:08 )             39.2     03-21    138  |  100  |  14  ----------------------------<  135<H>  4.4   |  27  |  0.5<L>    Ca    9.3      21 Mar 2023 11:08    TPro  7.0  /  Alb  4.2  /  TBili  1.4<H>  /  DBili  x   /  AST  32  /  ALT  41  /  AlkPhos  99  03-21    PT/INR - ( 21 Mar 2023 11:08 )   PT: 18.70 sec;   INR: 1.62 ratio         PTT - ( 21 Mar 2023 11:08 )  PTT:34.9 sec  CARDIAC MARKERS ( 21 Mar 2023 11:08 )  x     / <0.01 ng/mL / x     / x     / x          RADIOLOGY & ADDITIONAL STUDIES:    HCT:  no acute infarction or hemorrhage.  CTP:     CT Angio Neck Stroke Protocol w/ IV Cont (03.21.23 @ 11:28) >  IMPRESSION:    CT PERFUSION:  No perfusion deficits to suggest areas of completed infarction or at risk   territory.    CTA HEAD/NECK:  Interval patency of the previously occluded left ICA since the prior CTA   from 6/22/2022.    -----------------------------------------------------------------------------------------------------------------  IV-tenecetplase(Y/N):   no                            Reason IV-tenecetplase not given: out of the window for tPA, sx rapidly improved

## 2023-03-21 NOTE — ED CDU PROVIDER INITIAL DAY NOTE - NS ED ATTENDING STATEMENT MOD
This was a shared visit with the HUNTER. I reviewed and verified the documentation and independently performed the documented:

## 2023-03-21 NOTE — ED PROVIDER NOTE - ATTENDING CONTRIBUTION TO CARE
I personally evaluated the patient. I reviewed the Resident’s or Physician Assistant’s note (as assigned above), and agree with the findings and plan except as documented in my note.  82-year-old female former smoker, dyslipidemia, status post basal ganglia CVA 6/22, status post tPA, status post thrombectomy, postprocedure hospitalization complicated by subarachnoid hemorrhage, new onset A-fib, discharged home on anticoagulation, presents now with worsening of her residual right-sided weakness. I personally evaluated the patient. I reviewed the Resident’s or Physician Assistant’s note (as assigned above), and agree with the findings and plan except as documented in my note.  82-year-old female former smoker, dyslipidemia, status post basal ganglia CVA 6/22, status post tPA, status post thrombectomy, postprocedure hospitalization complicated by subarachnoid hemorrhage, new onset A-fib, discharged home on anticoagulation, presents now with worsening of her residual right-sided weakness and dysarthria. Patient had a near syncopal episode when attempting to have a bowel movement at 4 AM.  8 8 AM patient had another similar episode and home health aide noted worsening right-sided weakness and dysarthria.  Vitals noted.  CONSTITUTIONAL: Well-appearing; well-nourished; in no apparent distress.   HEAD: Normocephalic; atraumatic.   EYES: PERRL; EOM intact. Conjunctiva normal B/L.   ENT: Normal pharynx with no tonsillar hypertrophy. MMM.  NECK: Supple; non-tender; no cervical lymphadenopathy.   CHEST: Normal chest excursion with respiration.   CARDIOVASCULAR: Irreg irreg rhythm. no murmurs, rubs, or gallops.   RESPIRATORY: Normal chest excursion with respiration; breath sounds clear and equal bilaterally; no wheezes, rhonchi, or rales.  GI/: Normal bowel sounds; non-distended; non-tender.  BACK: No evidence of trauma or deformity. Non-tender to palpation. No CVA tenderness.   SKIN: Normal for age and race; warm; dry; good turgor.  NEURO: A & O. NIHSS-8 Chronic.

## 2023-03-21 NOTE — ED PROVIDER NOTE - PHYSICAL EXAMINATION
VITAL SIGNS: I have reviewed nursing notes and confirm.  CONSTITUTIONAL: well-appearing, non-toxic, NAD  SKIN: Warm dry, normal skin turgor  HEAD: NCAT  EYES: EOMI, PERRLA, no scleral icterus  ENT: Moist mucous membranes, normal pharynx with no erythema or exudates  NECK: Supple; non tender  CARD: RRR, no murmurs, rubs or gallops  RESP: clear to ausculation b/l.  No rales, rhonchi, or wheezing.  ABD: soft, + BS, non-tender, non-distended, no rebound or guarding  EXT: Full ROM, no bony tenderness, no pedal edema, no calf tenderness  NEURO: (+) rigth sided hand contraction, no  in right hand. Decreased ROM of right arm. Full ROM of left arm. Normal left  strength. (+) mild right sided facial droop  PSYCH: Cooperative, appropriate.

## 2023-03-22 VITALS
HEART RATE: 86 BPM | TEMPERATURE: 100 F | RESPIRATION RATE: 18 BRPM | SYSTOLIC BLOOD PRESSURE: 111 MMHG | OXYGEN SATURATION: 97 % | DIASTOLIC BLOOD PRESSURE: 52 MMHG

## 2023-03-22 PROCEDURE — 70551 MRI BRAIN STEM W/O DYE: CPT | Mod: 26,MA

## 2023-03-22 PROCEDURE — 99238 HOSP IP/OBS DSCHRG MGMT 30/<: CPT

## 2023-03-22 RX ORDER — ASPIRIN/CALCIUM CARB/MAGNESIUM 324 MG
1 TABLET ORAL
Qty: 21 | Refills: 0
Start: 2023-03-22 | End: 2023-04-11

## 2023-03-22 RX ADMIN — APIXABAN 5 MILLIGRAM(S): 2.5 TABLET, FILM COATED ORAL at 05:50

## 2023-03-22 RX ADMIN — Medication 12.5 MILLIGRAM(S): at 05:50

## 2023-03-22 NOTE — ED CDU PROVIDER DISPOSITION NOTE - CARE PROVIDER_API CALL
Bernard Gurrola; [facility])  Surgery  Neurosurgery  [address] [place] [address]  Phone: [phone]  Fax: [phone]  Follow Up Time: 1-3 Days

## 2023-03-22 NOTE — ED CDU PROVIDER DISPOSITION NOTE - PATIENT PORTAL LINK FT
You can access the FollowMyHealth Patient Portal offered by [facility] by registering at the following website: [email or web address]. By joining [employer]’s FollowMyHealth portal, you will also be able to view your health information using other applications (apps) compatible with our system.

## 2023-03-22 NOTE — ED CDU PROVIDER DISPOSITION NOTE - CLINICAL COURSE
Patient presented with stroke-like symptoms as documented. Initial stroke work up in ED negative - placed in obs per neuro recs for MRI. MRI obtained and negative, cleared from neuro standpoint. Patient ambulatory, tolerates PO. Given the above, will discharge home with outpatient follow up. Patient agreeable with plan. Agrees to return to ED for any new or worsening symptoms.

## 2023-03-22 NOTE — ED CDU PROVIDER DISPOSITION NOTE - NSFOLLOWUPCLINICS_GEN_ALL_ED_FT
[facility]  Neurology  [address] [place] [address], [facility] [address]  [place] [address]  Phone: [phone]  Fax:   Follow Up Time: 1-3 Days

## 2023-03-22 NOTE — ED CDU PROVIDER SUBSEQUENT DAY NOTE - CLINICAL SUMMARY MEDICAL DECISION MAKING FREE TEXT BOX
Patient presented with stroke-like symptoms as documented. Initial stroke work up in ED negative - placed in obs per neuro recs for MRI. MRI performed and pending read. NAD at this time.

## 2023-03-22 NOTE — ED CDU PROVIDER SUBSEQUENT DAY NOTE - PROGRESS NOTE DETAILS
MRI results noted, neuro resident aware, awaiting further recommendations. Daughter advised of CT findings of aneurysm and thyroid nodules. Copies of report given for outpatient f/u Per neuro, dc home with ASA 81mg x 3 weeks, f/u with neuroendovascular and neuro as outpatient

## 2023-03-22 NOTE — CHART NOTE - NSCHARTNOTEFT_GEN_A_CORE
MRI brain with possible mild extension of chronic left MCA stroke, likely realted to hypotension with pre-syncope/syncopal episode prior to admission. Recommend adding ASA 81mg daily x 21 days with AC, then d/c ASA and continue eliquis monotherapy. F/u in stroke clinic in 2 weeks and with SILVINA for 2mm cerebral aneurysm. MRI brain with possible mild extension of chronic left MCA stroke, likely related to hypotension with pre-syncope/syncopal episode prior to admission. Recommend adding ASA 81mg daily x 21 days with AC, then d/c ASA and continue eliquis monotherapy. F/u in stroke clinic in 2 weeks and with SILVINA for 2mm cerebral aneurysm.

## 2023-03-23 NOTE — PROGRESS NOTE ADULT - ASSESSMENT
Problem: Discharge Planning  Goal: Discharge to home or other facility with appropriate resources  3/22/2023 2313 by Sherly Kunz RN  Outcome: Progressing  Flowsheets (Taken 3/22/2023 2000)  Discharge to home or other facility with appropriate resources:   Identify barriers to discharge with patient and caregiver   Identify discharge learning needs (meds, wound care, etc)  3/22/2023 1039 by Shahriar Mims RN  Outcome: Progressing  Flowsheets (Taken 3/22/2023 0840)  Discharge to home or other facility with appropriate resources: Identify barriers to discharge with patient and caregiver     Problem: Chronic Conditions and Co-morbidities  Goal: Patient's chronic conditions and co-morbidity symptoms are monitored and maintained or improved  3/22/2023 2313 by Sherly Kunz RN  Outcome: Progressing  Flowsheets (Taken 3/22/2023 2000)  Care Plan - Patient's Chronic Conditions and Co-Morbidity Symptoms are Monitored and Maintained or Improved: Monitor and assess patient's chronic conditions and comorbid symptoms for stability, deterioration, or improvement  3/22/2023 1039 by Shahriar Mims RN  Outcome: Progressing  Flowsheets (Taken 3/22/2023 0840)  Care Plan - Patient's Chronic Conditions and Co-Morbidity Symptoms are Monitored and Maintained or Improved: Monitor and assess patient's chronic conditions and comorbid symptoms for stability, deterioration, or improvement     Problem: Safety - Adult  Goal: Free from fall injury  3/22/2023 2313 by Sherly Kunz RN  Outcome: Progressing  3/22/2023 1039 by Shahriar Mims RN  Outcome: Progressing     Problem: Pain  Goal: Verbalizes/displays adequate comfort level or baseline comfort level  3/22/2023 2313 by Sherly Kunz RN  Outcome: Progressing  Flowsheets (Taken 3/22/2023 2000)  Verbalizes/displays adequate comfort level or baseline comfort level:   Encourage patient to monitor pain and request assistance   Assess pain using appropriate pain scale  3/22/2023 1039 by Josh Prakash, RN  Outcome: Progressing This is a 82 yo F presenting with dysarthria, mild right facial droop, and RUE/RLE weakness. 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 s/p tpa @ 1630, 6/22. No NI intervention 2/2 NIHSS improving from 4 --> 1. After tpa, patient became aphasic, dilated left pupil, dense right-sided hemiplegia, and decreased sensation right lower arm and RLE, however still able to follow commands on left side. Patient taken for mechanical thrombectomy. Left proximal ICA/M1 occlusion with TICI 0-2C.  Post CT head showed acute SAH in the silvian fissure and left frontal/temporal/parietal sulci. Transferred to Stroke Unit for further monitoring and work-up.    Plan  Ischemic Stroke / SAH  - Neuro checks and vitals q4 hrs  - MRI Brain: Acute left basal ganglia infarct with associated mass effect upon the adjacent left lateral ventricle and slight shift of the third and lateral ventricles to the right of midline. Small acute infarcts the left frontal, left temporal, and left  parietal regions  - ILR cancelled as new onset of aflutter, EP consulted and aware. Recommends AC; however given bleed and size of stroke will hold off AC. Started ASA 6/26  - Repeat CTH (6/25): Slightly decreased subarachnoid hemorrhage in the left sylvian fissure and adjacent left temporal and parietal sulci compared to the prior CT. Redemonstrated evolving acute infarct in the left basal ganglia, left frontal lobe, and left temporal lobe with hemorrhagic transformation in the left subinsular region.   - Hold off on A/C for now  - Seizure precautions, Keppra 500 BID x 7 days  - Aspirin 81 mg daily  - Atorvastatin 80 mg qhs  - Stroke core measures done   - Repeat HCT stable, interval decrease in SAH  - PT/OT/Rehab  - Stroke education    Cardio/new onset Afib  -  - 140  - Added metoprolol 12.5 mg q12 hrs  - Continue high intensity statin  - LDL: 75  - Hold A/C for now - repeat HCT showed interval decrease in SAH     PULM: Smoker   - Nicotine patch   - HOB > 35 degrees  - Aspiration precautions  - Can d/c nasal cannula    GI  - Minced and moist diet  - Added Dulcolax for BM  - F/u repeat s/s eval    Renal/Lytes  - Keep K > 4, Mg > 2, replete PRN     Misc:  - Diet  - GI prophylaxis [X] other: H2 Blocker - on ant acid at home   - Bowel regimen [X] Miralax [X] senna [] other: Added Dulcolax  - VTE prophylaxis: [X] SCDs [X chemoprophylaxis  - PT/OT/SLP  - Dispo- family wants patient discharged to [facility]

## 2023-04-03 ENCOUNTER — EMERGENCY (EMERGENCY)
Facility: HOSPITAL | Age: 83
LOS: 0 days | Discharge: ANOTHER HOSPITAL | End: 2023-04-04
Attending: EMERGENCY MEDICINE
Payer: MEDICARE

## 2023-04-03 VITALS
HEART RATE: 86 BPM | DIASTOLIC BLOOD PRESSURE: 82 MMHG | RESPIRATION RATE: 18 BRPM | SYSTOLIC BLOOD PRESSURE: 166 MMHG | OXYGEN SATURATION: 100 %

## 2023-04-03 VITALS — TEMPERATURE: 97 F

## 2023-04-03 DIAGNOSIS — S72.011A UNSPECIFIED INTRACAPSULAR FRACTURE OF RIGHT FEMUR, INITIAL ENCOUNTER FOR CLOSED FRACTURE: ICD-10-CM
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
## 2023-04-03 DIAGNOSIS — Z79.82 LONG TERM (CURRENT) USE OF ASPIRIN: ICD-10-CM

## 2023-04-03 DIAGNOSIS — E78.5 HYPERLIPIDEMIA, UNSPECIFIED: ICD-10-CM

## 2023-04-03 DIAGNOSIS — U07.1 COVID-19: ICD-10-CM

## 2023-04-03 DIAGNOSIS — M25.551 PAIN IN RIGHT HIP: ICD-10-CM
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
## 2023-04-03 LAB
ALBUMIN SERPL ELPH-MCNC: 4.2 G/DL — SIGNIFICANT CHANGE UP (ref 3.5–5.2)
ALP SERPL-CCNC: 108 U/L — SIGNIFICANT CHANGE UP (ref 30–115)
ALT FLD-CCNC: 35 U/L — SIGNIFICANT CHANGE UP (ref 0–41)
ANION GAP SERPL CALC-SCNC: 11 MMOL/L — SIGNIFICANT CHANGE UP (ref 7–14)
APTT BLD: 36 SEC — SIGNIFICANT CHANGE UP (ref 27–39.2)
AST SERPL-CCNC: 32 U/L — SIGNIFICANT CHANGE UP (ref 0–41)
BASOPHILS # BLD AUTO: 0.04 K/UL — SIGNIFICANT CHANGE UP (ref 0–0.2)
BASOPHILS NFR BLD AUTO: 0.3 % — SIGNIFICANT CHANGE UP (ref 0–1)
BILIRUB SERPL-MCNC: 0.6 MG/DL — SIGNIFICANT CHANGE UP (ref 0.2–1.2)
BUN SERPL-MCNC: 20 MG/DL — SIGNIFICANT CHANGE UP (ref 10–20)
CALCIUM SERPL-MCNC: 9.5 MG/DL — SIGNIFICANT CHANGE UP (ref 8.4–10.5)
CHLORIDE SERPL-SCNC: 102 MMOL/L — SIGNIFICANT CHANGE UP (ref 98–110)
CO2 SERPL-SCNC: 29 MMOL/L — SIGNIFICANT CHANGE UP (ref 17–32)
CREAT SERPL-MCNC: 0.6 MG/DL — LOW (ref 0.7–1.5)
EGFR: 91 ML/MIN/1.73M2 — SIGNIFICANT CHANGE UP
EOSINOPHIL # BLD AUTO: 0.14 K/UL — SIGNIFICANT CHANGE UP (ref 0–0.7)
EOSINOPHIL NFR BLD AUTO: 1 % — SIGNIFICANT CHANGE UP (ref 0–8)
GLUCOSE SERPL-MCNC: 108 MG/DL — HIGH (ref 70–99)
HCT VFR BLD CALC: 40.2 % — SIGNIFICANT CHANGE UP (ref 37–47)
HGB BLD-MCNC: 13 G/DL — SIGNIFICANT CHANGE UP (ref 12–16)
IMM GRANULOCYTES NFR BLD AUTO: 1 % — HIGH (ref 0.1–0.3)
INR BLD: 2.02 RATIO — HIGH (ref 0.65–1.3)
LACTATE SERPL-SCNC: 0.8 MMOL/L — SIGNIFICANT CHANGE UP (ref 0.7–2)
LIDOCAIN IGE QN: 90 U/L — HIGH (ref 7–60)
LYMPHOCYTES # BLD AUTO: 1.44 K/UL — SIGNIFICANT CHANGE UP (ref 1.2–3.4)
LYMPHOCYTES # BLD AUTO: 10.7 % — LOW (ref 20.5–51.1)
MCHC RBC-ENTMCNC: 28.5 PG — SIGNIFICANT CHANGE UP (ref 27–31)
MCHC RBC-ENTMCNC: 32.3 G/DL — SIGNIFICANT CHANGE UP (ref 32–37)
MCV RBC AUTO: 88.2 FL — SIGNIFICANT CHANGE UP (ref 81–99)
MONOCYTES # BLD AUTO: 0.65 K/UL — HIGH (ref 0.1–0.6)
MONOCYTES NFR BLD AUTO: 4.8 % — SIGNIFICANT CHANGE UP (ref 1.7–9.3)
NEUTROPHILS # BLD AUTO: 11.06 K/UL — HIGH (ref 1.4–6.5)
NEUTROPHILS NFR BLD AUTO: 82.2 % — HIGH (ref 42.2–75.2)
NRBC # BLD: 0 /100 WBCS — SIGNIFICANT CHANGE UP (ref 0–0)
PLATELET # BLD AUTO: 435 K/UL — HIGH (ref 130–400)
POTASSIUM SERPL-MCNC: 4.3 MMOL/L — SIGNIFICANT CHANGE UP (ref 3.5–5)
POTASSIUM SERPL-SCNC: 4.3 MMOL/L — SIGNIFICANT CHANGE UP (ref 3.5–5)
PROT SERPL-MCNC: 7.2 G/DL — SIGNIFICANT CHANGE UP (ref 6–8)
PROTHROM AB SERPL-ACNC: 23.5 SEC — HIGH (ref 9.95–12.87)
RBC # BLD: 4.56 M/UL — SIGNIFICANT CHANGE UP (ref 4.2–5.4)
RBC # FLD: 13 % — SIGNIFICANT CHANGE UP (ref 11.5–14.5)
SODIUM SERPL-SCNC: 142 MMOL/L — SIGNIFICANT CHANGE UP (ref 135–146)
WBC # BLD: 13.46 K/UL — HIGH (ref 4.8–10.8)
WBC # FLD AUTO: 13.46 K/UL — HIGH (ref 4.8–10.8)

## 2023-04-03 PROCEDURE — 83605 ASSAY OF LACTIC ACID: CPT

## 2023-04-03 PROCEDURE — 86901 BLOOD TYPING SEROLOGIC RH(D): CPT

## 2023-04-03 PROCEDURE — 85025 COMPLETE CBC W/AUTO DIFF WBC: CPT

## 2023-04-03 PROCEDURE — 85730 THROMBOPLASTIN TIME PARTIAL: CPT

## 2023-04-03 PROCEDURE — 80053 COMPREHEN METABOLIC PANEL: CPT

## 2023-04-03 PROCEDURE — 71045 X-RAY EXAM CHEST 1 VIEW: CPT

## 2023-04-03 PROCEDURE — 99284 EMERGENCY DEPT VISIT MOD MDM: CPT | Mod: 25

## 2023-04-03 PROCEDURE — 83690 ASSAY OF LIPASE: CPT

## 2023-04-03 PROCEDURE — 96376 TX/PRO/DX INJ SAME DRUG ADON: CPT

## 2023-04-03 PROCEDURE — 73562 X-RAY EXAM OF KNEE 3: CPT | Mod: RT

## 2023-04-03 PROCEDURE — 99285 EMERGENCY DEPT VISIT HI MDM: CPT | Mod: CS

## 2023-04-03 PROCEDURE — 71045 X-RAY EXAM CHEST 1 VIEW: CPT | Mod: 26

## 2023-04-03 PROCEDURE — 73552 X-RAY EXAM OF FEMUR 2/>: CPT | Mod: 26,RT

## 2023-04-03 PROCEDURE — 87635 SARS-COV-2 COVID-19 AMP PRB: CPT

## 2023-04-03 PROCEDURE — 86900 BLOOD TYPING SEROLOGIC ABO: CPT

## 2023-04-03 PROCEDURE — 72170 X-RAY EXAM OF PELVIS: CPT

## 2023-04-03 PROCEDURE — 86850 RBC ANTIBODY SCREEN: CPT

## 2023-04-03 PROCEDURE — 73552 X-RAY EXAM OF FEMUR 2/>: CPT | Mod: RT

## 2023-04-03 PROCEDURE — 70450 CT HEAD/BRAIN W/O DYE: CPT | Mod: MA

## 2023-04-03 PROCEDURE — 72125 CT NECK SPINE W/O DYE: CPT | Mod: MA

## 2023-04-03 PROCEDURE — 72170 X-RAY EXAM OF PELVIS: CPT | Mod: 26

## 2023-04-03 PROCEDURE — 85610 PROTHROMBIN TIME: CPT

## 2023-04-03 PROCEDURE — 96374 THER/PROPH/DIAG INJ IV PUSH: CPT

## 2023-04-03 PROCEDURE — 36415 COLL VENOUS BLD VENIPUNCTURE: CPT

## 2023-04-03 PROCEDURE — 73562 X-RAY EXAM OF KNEE 3: CPT | Mod: 26,RT

## 2023-04-03 RX ORDER — MORPHINE SULFATE 50 MG/1
4 CAPSULE, EXTENDED RELEASE ORAL ONCE
Refills: 0 | Status: DISCONTINUED | OUTPATIENT
Start: 2023-04-03 | End: 2023-04-03

## 2023-04-03 RX ADMIN — MORPHINE SULFATE 4 MILLIGRAM(S): 50 CAPSULE, EXTENDED RELEASE ORAL at 23:49

## 2023-04-03 RX ADMIN — MORPHINE SULFATE 4 MILLIGRAM(S): 50 CAPSULE, EXTENDED RELEASE ORAL at 23:19

## 2023-04-03 NOTE — ED ADULT NURSE REASSESSMENT NOTE - NS ED NURSE REASSESS COMMENT FT1
Spoke with Beata at the [facility]. Pt. will be transported to [facility]. Pt. coordinator will be in touch when a bed becomes available and transport will be set up on their end.

## 2023-04-03 NOTE — ED PROVIDER NOTE - ATTENDING CONTRIBUTION TO CARE
82-year-old female past medical history as above mechanical slip and fall today landed on her right hip complaining of pain to her hip and knee.  Denies other symptoms.  Did not hit her head.  On exam vital signs unremarkable she has a right hip that is low with lower extremity is externally rotated and shortened.  Plan is CTs and x-rays.

## 2023-04-03 NOTE — ED PROVIDER NOTE - PHYSICAL EXAMINATION
CONSTITUTIONAL: NAD  SKIN: Warm dry  HEAD: NCAT  EYES: NL inspection  ENT: MMM  NECK: Supple; non tender.  CARD: RRR  RESP: CTAB  ABD: S/NT no R/G  EXT: no pedal edema, 2+pulses, right hip ttp  NEURO: Grossly unremarkable  PSYCH: Cooperative, appropriate.

## 2023-04-03 NOTE — ED ADULT NURSE NOTE - OBJECTIVE STATEMENT
Pt. BIBA from home for fall. Pt. states she was standing and tripped and fell. Pt. denies hitting head, LOC. Pt. is on AC, Eliquis. Right lower extremity deformity noted. Pt. complains of pain 9/10.

## 2023-04-03 NOTE — ED PROVIDER NOTE - OBJECTIVE STATEMENT
82 y f, pmh of hld, pw slip and fall. No blood thinners, no loc, no head trauma, endorses pain in right hip. No cp, abd pain, back pain, pain in extremities otherwise

## 2023-04-03 NOTE — ED ADULT TRIAGE NOTE - CHIEF COMPLAINT QUOTE
Pt. BIBA from home post trip and fall. Pt. denies any head trauma. Right lower extremity deformity noted. On Eliquis anticoagulant

## 2023-04-04 LAB
BLD GP AB SCN SERPL QL: SIGNIFICANT CHANGE UP
SARS-COV-2 RNA SPEC QL NAA+PROBE: DETECTED

## 2023-04-04 PROCEDURE — 72125 CT NECK SPINE W/O DYE: CPT | Mod: 26,MA

## 2023-04-04 PROCEDURE — 70450 CT HEAD/BRAIN W/O DYE: CPT | Mod: 26,MA

## 2023-04-04 RX ORDER — MORPHINE SULFATE 50 MG/1
4 CAPSULE, EXTENDED RELEASE ORAL ONCE
Refills: 0 | Status: DISCONTINUED | OUTPATIENT
Start: 2023-04-04 | End: 2023-04-04

## 2023-04-04 RX ADMIN — MORPHINE SULFATE 4 MILLIGRAM(S): 50 CAPSULE, EXTENDED RELEASE ORAL at 01:00

## 2023-04-04 RX ADMIN — MORPHINE SULFATE 4 MILLIGRAM(S): 50 CAPSULE, EXTENDED RELEASE ORAL at 00:30

## 2023-04-11 ENCOUNTER — APPOINTMENT (OUTPATIENT)
Dept: NEUROLOGY | Facility: CLINIC | Age: 83
End: 2023-04-11

## 2023-06-02 NOTE — PHYSICAL THERAPY INITIAL EVALUATION ADULT - DIAGNOSIS, PT EVAL
Albendazole Pregnancy And Lactation Text: This medication is Pregnancy Category C and it isn't known if it is safe during pregnancy. It is also excreted in breast milk. Dysfunction of gait and mobility due to CVA affecting R UE/LE.

## 2023-09-06 NOTE — CONSULT NOTE ADULT - CONSULT REQUESTED BY NAME
Alert-The patient is alert, awake and responds to voice. The patient is oriented to time, place, and person. The triage nurse is able to obtain subjective information.
med
Neurology
Dr Payton

## 2024-03-07 ENCOUNTER — APPOINTMENT (OUTPATIENT)
Dept: NEUROLOGY | Facility: CLINIC | Age: 84
End: 2024-03-07
Payer: MEDICARE

## 2024-03-07 VITALS
SYSTOLIC BLOOD PRESSURE: 121 MMHG | HEIGHT: 63 IN | WEIGHT: 140 LBS | HEART RATE: 75 BPM | DIASTOLIC BLOOD PRESSURE: 61 MMHG | BODY MASS INDEX: 24.8 KG/M2

## 2024-03-07 DIAGNOSIS — Z86.73 PERSONAL HISTORY OF TRANSIENT ISCHEMIC ATTACK (TIA), AND CEREBRAL INFARCTION W/OUT RESIDUAL DEFICITS: ICD-10-CM

## 2024-03-07 DIAGNOSIS — I67.1 CEREBRAL ANEURYSM, NONRUPTURED: ICD-10-CM

## 2024-03-07 PROCEDURE — G2211 COMPLEX E/M VISIT ADD ON: CPT

## 2024-03-07 PROCEDURE — 99215 OFFICE O/P EST HI 40 MIN: CPT

## 2024-03-07 RX ORDER — ERGOCALCIFEROL (VITAMIN D2) 1250 MCG
50000 CAPSULE ORAL
Refills: 0 | Status: ACTIVE | COMMUNITY

## 2024-03-07 RX ORDER — FAMOTIDINE 20 MG/1
20 TABLET, FILM COATED ORAL
Refills: 0 | Status: DISCONTINUED | COMMUNITY
End: 2024-03-07

## 2024-03-07 RX ORDER — ALIROCUMAB 75 MG/ML
75 INJECTION, SOLUTION SUBCUTANEOUS
Refills: 0 | Status: ACTIVE | COMMUNITY

## 2024-03-07 RX ORDER — ATORVASTATIN CALCIUM 80 MG/1
80 TABLET, FILM COATED ORAL
Refills: 0 | Status: DISCONTINUED | COMMUNITY
End: 2024-03-07

## 2024-03-07 RX ORDER — NITROFURANTOIN (MONOHYDRATE/MACROCRYSTALS) 25; 75 MG/1; MG/1
100 CAPSULE ORAL
Qty: 10 | Refills: 0 | Status: DISCONTINUED | COMMUNITY
Start: 2022-09-26 | End: 2024-03-07

## 2024-03-07 RX ORDER — ATORVASTATIN CALCIUM 40 MG/1
40 TABLET, FILM COATED ORAL
Refills: 0 | Status: ACTIVE | COMMUNITY

## 2024-03-07 NOTE — HISTORY OF PRESENT ILLNESS
[FreeTextEntry1] : Since her stroke in 2022 she has been doing well.  She did suffer a fall in 2023 and was transferred to [facility] for surgery.  She saw a neurologist in [facility] and has had no f/u since then.  Her only complaint is swelling in the right LE.  She does have right hemiparesis and does PT 2 days a week but doesnt exercise on the other days of week.  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 (+) Left ICA and prox M1 MCA occlusion, with perfusion  deficit in the left frontotemporal regions along the MCA territory measuring 81  mL, with no core infarct. s/p tpa @ 1630, 6/22. No NI intervention 2/2 NIHSS  improving from 4 --> 1. After tpa, patient became aphasic, dilated left pupil,  dense right-sided hemiplegia, and decreased sensation right lower arm and RLE,  however still able to follow commands on left side. Patient taken for  mechanical thrombectomy. Left proximal ICA/M1 occlusion with TICI 0-2C.  Post  CT head showed acute SAH in the silvian fissure and left  frontal/temporal/parietal sulci. Transferred to Stroke Unit for further  monitoring and work-up.    Plan  Ischemic Stroke / SAH  - Neuro checks and vitals q4 hrs  - MRI Brain: Acute left basal ganglia infarct with associated mass effect upon  the adjacent left lateral ventricle and slight shift of the third and lateral  ventricles to the right of midline. Small acute infarcts the left frontal, left  temporal, and left  parietal regions  - ILR cancelled as new onset of aflutter, EP consulted and aware. Recommends  AC; however given bleed and size of stroke will hold off AC. Started ASA 6/26  - Repeat CTH (6/25): Slightly decreased subarachnoid hemorrhage in the left  sylvian fissure and adjacent left temporal and parietal sulci compared to the  prior CT. Redemonstrated evolving acute infarct in the left basal ganglia, left  frontal lobe, and left temporal lobe with hemorrhagic transformation in the  left subinsular region.  - Hold off on A/C for now  - Seizure precautions, Keppra 500 BID x 7 days  - Aspirin 81 mg daily  - Atorvastatin 80 mg qhs  - Stroke core measures done  - Repeat HCT stable, interval decrease in SAH  - PT/OT/Rehab  - Stroke education    Cardio/new onset Afib  -  - 140  - Added metoprolol 12.5 mg q12 hrs  - Continue high intensity statin  - LDL: 75  - Hold A/C for now - repeat HCT showed interval decrease in SAH    PULM: Smoker  - Nicotine patch  - HOB > 35 degrees  - Aspiration precautions  - Can d/c nasal cannula    GI  - Minced and moist diet  - Added Dulcolax for BM  - F/u repeat s/s eval    Renal/Lytes  - Keep K > 4, Mg > 2, replete PRN    Misc:  - Diet  - GI prophylaxis [X] other: H2 Blocker - on ant acid at home  - Bowel regimen [X] Miralax [X] senna [] other: Added Dulcolax  - VTE prophylaxis: [X] SCDs [X chemoprophylaxis  - PT/OT/SLP  - Dispo- family wants patient discharged to [facility]    New medications upon discharge:  Please take Aspirin 81 mg daily    Can take Keppra 500 mg po two times per day    Can take metoprolol tartrate 12.5 mg two time per day    No need for continuous telemetry monitoring in rehabilitation      Stroke attending attestation:  Pt is a 82 yo F with PMhx of tobacco use, HLD, who presented with aphasia and  right hemiparesis. S/p IV alteplase. Exam worsened, pt taken for left ICA/MCA  thrombectomy, s/p EVT TICI 2c.    Impr: acute ischemic stroke of left MCA territory with SAH  new onset afib noted  Repeat CT head on 7/6, before considering starting anticoagulation for  secondary stroke prevention  F/u in stroke clinic in 2-3 weeks    Med Reconciliation:  Medication Reconciliation Status Admission Reconciliation is Completed  Discharge Reconciliation is Completed  Discharge Medications aspirin 81 mg oral delayed release tablet: 1 tab(s)  orally once a day  atorvastatin 10 mg oral tablet: 1 tab(s) orally once a day  levETIRAcetam 500 mg oral tablet: 1 tab(s) orally 2 times a day  metoprolol: 1 tab(s) orally 2 times a day  .

## 2024-03-07 NOTE — DATA REVIEWED
[de-identified] : reviewed records from stroke admission in 2022.  Reviewed MRI brain and CTA images

## 2024-03-07 NOTE — PHYSICAL EXAM
[FreeTextEntry1] : a+Ox3 language and attention normal CN 2-12 normal EXCEPT right UMN facial and no vision in left eye RUE 4/5 proximally and 4-/5 distally on extension and 5-/5 on flexion RLE 5-/5 proximally and 4/5 in left foot DF LUE/LLE 5/5 Some atrophy in the right hand (thenar/hypothenar) Swelling in the right leg FTN NL DTR 2+ in RUE trace LUE 1+ R-patellar 0 LLE Gait needs assistance

## 2024-03-07 NOTE — DISCUSSION/SUMMARY
[FreeTextEntry1] : Ms. Hogue is a 82yo woman with stroke in 2022 due too aflutter.  She was started on eliquis 5mg BID and other then fall and hip fx in 2023 has been doing well.  She is following up on a 2mm L-SCA aneurysm which was seen in 2022 and stable in 3/2023. 1. Continue eliquis 2. LDL goal 50-70 (follow lipids as currently in the 30's) 3. CTA Head (f/u aneurysm) 4. Encouraged daily PT and stretching 5. f/u in 1 year 6. If CTA shows change in aneurysm to 3mm or greater would refer to neuroendovascular specialist

## 2024-05-11 ENCOUNTER — RESULT REVIEW (OUTPATIENT)
Age: 84
End: 2024-05-11

## 2024-05-11 ENCOUNTER — OUTPATIENT (OUTPATIENT)
Dept: OUTPATIENT SERVICES | Facility: HOSPITAL | Age: 84
LOS: 1 days | End: 2024-05-11
Payer: MEDICARE

## 2024-05-11 DIAGNOSIS — I67.1 CEREBRAL ANEURYSM, NONRUPTURED: ICD-10-CM

## 2024-05-11 DIAGNOSIS — Z00.8 ENCOUNTER FOR OTHER GENERAL EXAMINATION: ICD-10-CM

## 2024-05-11 PROCEDURE — 70496 CT ANGIOGRAPHY HEAD: CPT | Mod: 26

## 2024-05-11 PROCEDURE — 70496 CT ANGIOGRAPHY HEAD: CPT

## 2024-05-12 DIAGNOSIS — I67.1 CEREBRAL ANEURYSM, NONRUPTURED: ICD-10-CM

## 2024-07-15 NOTE — ED PROVIDER NOTE - BIRTH SEX
Female

## 2024-09-06 ENCOUNTER — APPOINTMENT (OUTPATIENT)
Dept: CARDIOLOGY | Facility: CLINIC | Age: 84
End: 2024-09-06
Payer: MEDICARE

## 2024-09-06 VITALS
BODY MASS INDEX: 27.64 KG/M2 | SYSTOLIC BLOOD PRESSURE: 114 MMHG | DIASTOLIC BLOOD PRESSURE: 62 MMHG | WEIGHT: 156 LBS | HEIGHT: 63 IN | HEART RATE: 85 BPM

## 2024-09-06 DIAGNOSIS — I67.1 CEREBRAL ANEURYSM, NONRUPTURED: ICD-10-CM

## 2024-09-06 DIAGNOSIS — Z87.891 PERSONAL HISTORY OF NICOTINE DEPENDENCE: ICD-10-CM

## 2024-09-06 DIAGNOSIS — R13.0 APHAGIA: ICD-10-CM

## 2024-09-06 DIAGNOSIS — E78.5 HYPERLIPIDEMIA, UNSPECIFIED: ICD-10-CM

## 2024-09-06 DIAGNOSIS — I60.9 NONTRAUMATIC SUBARACHNOID HEMORRHAGE, UNSPECIFIED: ICD-10-CM

## 2024-09-06 DIAGNOSIS — I48.0 PAROXYSMAL ATRIAL FIBRILLATION: ICD-10-CM

## 2024-09-06 DIAGNOSIS — Z87.39 PERSONAL HISTORY OF OTHER DISEASES OF THE MUSCULOSKELETAL SYSTEM AND CONNECTIVE TISSUE: ICD-10-CM

## 2024-09-06 DIAGNOSIS — Z86.73 PERSONAL HISTORY OF TRANSIENT ISCHEMIC ATTACK (TIA), AND CEREBRAL INFARCTION W/OUT RESIDUAL DEFICITS: ICD-10-CM

## 2024-09-06 PROCEDURE — 93000 ELECTROCARDIOGRAM COMPLETE: CPT

## 2024-09-06 PROCEDURE — 99204 OFFICE O/P NEW MOD 45 MIN: CPT | Mod: 25

## 2024-09-06 RX ORDER — ATORVASTATIN CALCIUM 20 MG/1
20 TABLET, FILM COATED ORAL
Refills: 0 | Status: ACTIVE | COMMUNITY

## 2024-09-09 NOTE — HISTORY OF PRESENT ILLNESS
[FreeTextEntry1] : Pt is 84 year old female with PMH of CVA in 2022, subarachnoid hemorrhage, Stable 2mm aneurysm in L SCA, A flutter, R sided hemiplegia, vision loss L eye  Pt was followed by Dr Lu in the past and wishes to transfer care here  Pt denies chest pain, no SOB,. no palpitations, chronic RLE edema Chol 91 Trig 73 LDL 32  Hx of smoking 1 pack per day quit after the stroke

## 2024-09-09 NOTE — REVIEW OF SYSTEMS
[Lower Ext Edema] : lower extremity edema [Limb Weakness (Paresis)] : limb weakness (Paresis) [Speech Disturbance] : speech disturbance [Negative] : Heme/Lymph [Feeling Fatigued] : feeling fatigued [Dyspnea on exertion] : not dyspnea during exertion [Chest Discomfort] : no chest discomfort [Palpitations] : no palpitations [Orthopnea] : no orthopnea

## 2024-09-09 NOTE — ASSESSMENT
[FreeTextEntry1] : Assessment: #Aneurysm stable  #S/P CVA 2022 #AFib #HLD   Plan: Cont Eliquis 5mg BID Cont Praluent inj, cont Atorvastatin 20mg Labs next week with PMD Will decrease Atorvastatin if LDL is low Cont PT at home RLEs elevation  Low Chol, Low Na diet  F/u in 6 months or sooner PRN  att note pt seen and examined agree with above

## 2024-09-09 NOTE — REASON FOR VISIT
[CV Risk Factors and Non-Cardiac Disease] : CV risk factors and non-cardiac disease [Hyperlipidemia] : hyperlipidemia [Other: _____] : [unfilled] [Symptom and Test Evaluation] : symptom and test evaluation [Arrhythmia/ECG Abnorrmalities] : arrhythmia/ECG abnormalities [Hypertension] : hypertension

## 2024-09-09 NOTE — REVIEW OF SYSTEMS
[FreeTextEntry8] : Patient coming in with shortness of breath with asthma exacerbation.  No fever.  No overall changes in exertional or functional ability.  Has not used any inhalers or nebulizers.\par  [Lower Ext Edema] : lower extremity edema [Limb Weakness (Paresis)] : limb weakness (Paresis) [Speech Disturbance] : speech disturbance [Negative] : Heme/Lymph [Feeling Fatigued] : feeling fatigued [Dyspnea on exertion] : not dyspnea during exertion [Chest Discomfort] : no chest discomfort [Palpitations] : no palpitations [Orthopnea] : no orthopnea

## 2024-09-09 NOTE — PHYSICAL EXAM
[Well Developed] : well developed [No Acute Distress] : no acute distress [Normal Conjunctiva] : normal conjunctiva [Normal Venous Pressure] : normal venous pressure [No Carotid Bruit] : no carotid bruit [Normal S1, S2] : normal S1, S2 [No Murmur] : no murmur [No Rub] : no rub [No Gallop] : no gallop [Clear Lung Fields] : clear lung fields [Alert and Oriented] : alert and oriented [Normal memory] : normal memory [Soft] : abdomen soft [Non Tender] : non-tender [Abnormal Gait] : abnormal gait [de-identified] : OOB to W/C  [de-identified] : RLEs edema  [de-identified] : Aphagia, RLE hemiparesis

## 2024-09-09 NOTE — PHYSICAL EXAM
[Well Developed] : well developed [No Acute Distress] : no acute distress [Normal Conjunctiva] : normal conjunctiva [Normal Venous Pressure] : normal venous pressure [No Carotid Bruit] : no carotid bruit [Normal S1, S2] : normal S1, S2 [No Murmur] : no murmur [No Rub] : no rub [No Gallop] : no gallop [Clear Lung Fields] : clear lung fields [Alert and Oriented] : alert and oriented [Normal memory] : normal memory [Soft] : abdomen soft [Non Tender] : non-tender [Abnormal Gait] : abnormal gait [de-identified] : OOB to W/C  [de-identified] : RLEs edema  [de-identified] : Aphagia, RLE hemiparesis

## 2024-09-27 ENCOUNTER — EMERGENCY (EMERGENCY)
Facility: HOSPITAL | Age: 84
LOS: 0 days | Discharge: HOME | End: 2024-09-27
Attending: STUDENT IN AN ORGANIZED HEALTH CARE EDUCATION/TRAINING PROGRAM
Payer: MEDICARE

## 2024-09-27 VITALS
OXYGEN SATURATION: 95 % | RESPIRATION RATE: 16 BRPM | HEART RATE: 99 BPM | TEMPERATURE: 99 F | WEIGHT: 149.91 LBS | SYSTOLIC BLOOD PRESSURE: 135 MMHG | DIASTOLIC BLOOD PRESSURE: 73 MMHG

## 2024-09-27 DIAGNOSIS — H57.12 OCULAR PAIN, LEFT EYE: ICD-10-CM

## 2024-09-27 DIAGNOSIS — R51.9 HEADACHE, UNSPECIFIED: ICD-10-CM

## 2024-09-27 DIAGNOSIS — T50.906A UNDERDOSING OF UNSPECIFIED DRUGS, MEDICAMENTS AND BIOLOGICAL SUBSTANCES, INITIAL ENCOUNTER: ICD-10-CM

## 2024-09-27 DIAGNOSIS — Z86.73 PERSONAL HISTORY OF TRANSIENT ISCHEMIC ATTACK (TIA), AND CEREBRAL INFARCTION WITHOUT RESIDUAL DEFICITS: ICD-10-CM

## 2024-09-27 DIAGNOSIS — R11.2 NAUSEA WITH VOMITING, UNSPECIFIED: ICD-10-CM

## 2024-09-27 DIAGNOSIS — H40.212 ACUTE ANGLE-CLOSURE GLAUCOMA, LEFT EYE: ICD-10-CM

## 2024-09-27 DIAGNOSIS — E78.5 HYPERLIPIDEMIA, UNSPECIFIED: ICD-10-CM

## 2024-09-27 LAB
ALBUMIN SERPL ELPH-MCNC: 4.2 G/DL — SIGNIFICANT CHANGE UP (ref 3.5–5.2)
ALP SERPL-CCNC: 78 U/L — SIGNIFICANT CHANGE UP (ref 30–115)
ALT FLD-CCNC: 11 U/L — SIGNIFICANT CHANGE UP (ref 0–41)
ANION GAP SERPL CALC-SCNC: 8 MMOL/L — SIGNIFICANT CHANGE UP (ref 7–14)
AST SERPL-CCNC: 19 U/L — SIGNIFICANT CHANGE UP (ref 0–41)
BASE EXCESS BLDV CALC-SCNC: 3.1 MMOL/L — HIGH (ref -2–3)
BASOPHILS # BLD AUTO: 0.01 K/UL — SIGNIFICANT CHANGE UP (ref 0–0.2)
BASOPHILS NFR BLD AUTO: 0.1 % — SIGNIFICANT CHANGE UP (ref 0–1)
BILIRUB SERPL-MCNC: 0.9 MG/DL — SIGNIFICANT CHANGE UP (ref 0.2–1.2)
BUN SERPL-MCNC: 13 MG/DL — SIGNIFICANT CHANGE UP (ref 10–20)
CALCIUM SERPL-MCNC: 8.8 MG/DL — SIGNIFICANT CHANGE UP (ref 8.4–10.5)
CHLORIDE SERPL-SCNC: 103 MMOL/L — SIGNIFICANT CHANGE UP (ref 98–110)
CO2 SERPL-SCNC: 25 MMOL/L — SIGNIFICANT CHANGE UP (ref 17–32)
CREAT SERPL-MCNC: <0.5 MG/DL — LOW (ref 0.7–1.5)
EGFR: 99 ML/MIN/1.73M2 — SIGNIFICANT CHANGE UP
EOSINOPHIL # BLD AUTO: 0 K/UL — SIGNIFICANT CHANGE UP (ref 0–0.7)
EOSINOPHIL NFR BLD AUTO: 0 % — SIGNIFICANT CHANGE UP (ref 0–8)
GLUCOSE SERPL-MCNC: 125 MG/DL — HIGH (ref 70–99)
HCO3 BLDV-SCNC: 30 MMOL/L — HIGH (ref 22–29)
HCT VFR BLD CALC: 36.9 % — LOW (ref 37–47)
HGB BLD-MCNC: 12.3 G/DL — SIGNIFICANT CHANGE UP (ref 12–16)
IMM GRANULOCYTES NFR BLD AUTO: 0.4 % — HIGH (ref 0.1–0.3)
LIDOCAIN IGE QN: 34 U/L — SIGNIFICANT CHANGE UP (ref 7–60)
LYMPHOCYTES # BLD AUTO: 0.68 K/UL — LOW (ref 1.2–3.4)
LYMPHOCYTES # BLD AUTO: 8.5 % — LOW (ref 20.5–51.1)
MCHC RBC-ENTMCNC: 29.5 PG — SIGNIFICANT CHANGE UP (ref 27–31)
MCHC RBC-ENTMCNC: 33.3 G/DL — SIGNIFICANT CHANGE UP (ref 32–37)
MCV RBC AUTO: 88.5 FL — SIGNIFICANT CHANGE UP (ref 81–99)
MONOCYTES # BLD AUTO: 0.36 K/UL — SIGNIFICANT CHANGE UP (ref 0.1–0.6)
MONOCYTES NFR BLD AUTO: 4.5 % — SIGNIFICANT CHANGE UP (ref 1.7–9.3)
NEUTROPHILS # BLD AUTO: 6.92 K/UL — HIGH (ref 1.4–6.5)
NEUTROPHILS NFR BLD AUTO: 86.5 % — HIGH (ref 42.2–75.2)
NRBC # BLD: 0 /100 WBCS — SIGNIFICANT CHANGE UP (ref 0–0)
PCO2 BLDV: 51 MMHG — HIGH (ref 39–42)
PH BLDV: 7.37 — SIGNIFICANT CHANGE UP (ref 7.32–7.43)
PLATELET # BLD AUTO: 259 K/UL — SIGNIFICANT CHANGE UP (ref 130–400)
PMV BLD: 10.3 FL — SIGNIFICANT CHANGE UP (ref 7.4–10.4)
PO2 BLDV: 47 MMHG — HIGH (ref 25–45)
POTASSIUM SERPL-MCNC: 4.3 MMOL/L — SIGNIFICANT CHANGE UP (ref 3.5–5)
POTASSIUM SERPL-SCNC: 4.3 MMOL/L — SIGNIFICANT CHANGE UP (ref 3.5–5)
PROT SERPL-MCNC: 6.6 G/DL — SIGNIFICANT CHANGE UP (ref 6–8)
RBC # BLD: 4.17 M/UL — LOW (ref 4.2–5.4)
RBC # FLD: 12.7 % — SIGNIFICANT CHANGE UP (ref 11.5–14.5)
SAO2 % BLDV: 76.1 % — SIGNIFICANT CHANGE UP (ref 67–88)
SODIUM SERPL-SCNC: 136 MMOL/L — SIGNIFICANT CHANGE UP (ref 135–146)
WBC # BLD: 8 K/UL — SIGNIFICANT CHANGE UP (ref 4.8–10.8)
WBC # FLD AUTO: 8 K/UL — SIGNIFICANT CHANGE UP (ref 4.8–10.8)

## 2024-09-27 PROCEDURE — 80053 COMPREHEN METABOLIC PANEL: CPT

## 2024-09-27 PROCEDURE — 36415 COLL VENOUS BLD VENIPUNCTURE: CPT

## 2024-09-27 PROCEDURE — 99284 EMERGENCY DEPT VISIT MOD MDM: CPT

## 2024-09-27 PROCEDURE — 71045 X-RAY EXAM CHEST 1 VIEW: CPT | Mod: 26

## 2024-09-27 PROCEDURE — 82803 BLOOD GASES ANY COMBINATION: CPT

## 2024-09-27 PROCEDURE — 92002 INTRM OPH EXAM NEW PATIENT: CPT

## 2024-09-27 PROCEDURE — 83690 ASSAY OF LIPASE: CPT

## 2024-09-27 PROCEDURE — 36000 PLACE NEEDLE IN VEIN: CPT

## 2024-09-27 PROCEDURE — 99284 EMERGENCY DEPT VISIT MOD MDM: CPT | Mod: 25

## 2024-09-27 PROCEDURE — 85025 COMPLETE CBC W/AUTO DIFF WBC: CPT

## 2024-09-27 PROCEDURE — 71045 X-RAY EXAM CHEST 1 VIEW: CPT

## 2024-09-27 PROCEDURE — 83605 ASSAY OF LACTIC ACID: CPT

## 2024-09-27 RX ORDER — BRIMONIDINE TARTRATE 2 MG/ML
1 SOLUTION/ DROPS OPHTHALMIC ONCE
Refills: 0 | Status: DISCONTINUED | OUTPATIENT
Start: 2024-09-27 | End: 2024-09-27

## 2024-09-27 RX ORDER — BRIMONIDINE TARTRATE 2 MG/ML
1 SOLUTION/ DROPS OPHTHALMIC
Qty: 2 | Refills: 3
Start: 2024-09-27 | End: 2025-01-20

## 2024-09-27 RX ORDER — FLUORESCEIN SODIUM 2 %
1 DROPS OPHTHALMIC (EYE) ONCE
Refills: 0 | Status: COMPLETED | OUTPATIENT
Start: 2024-09-27 | End: 2024-09-27

## 2024-09-27 RX ORDER — CYCLOPENTOLATE HCL 1 %
1 DROPS OPHTHALMIC (EYE)
Qty: 2 | Refills: 3
Start: 2024-09-27 | End: 2025-01-24

## 2024-09-27 RX ORDER — SODIUM CHLORIDE 9 MG/ML
1000 INJECTION INTRAMUSCULAR; INTRAVENOUS; SUBCUTANEOUS ONCE
Refills: 0 | Status: COMPLETED | OUTPATIENT
Start: 2024-09-27 | End: 2024-09-27

## 2024-09-27 RX ORDER — TIMOLOL MALEATE 5 MG/ML
1 SOLUTION/ DROPS OPHTHALMIC ONCE
Refills: 0 | Status: DISCONTINUED | OUTPATIENT
Start: 2024-09-27 | End: 2024-09-27

## 2024-09-27 RX ORDER — ACETAZOLAMIDE 250 MG
500 TABLET ORAL ONCE
Refills: 0 | Status: DISCONTINUED | OUTPATIENT
Start: 2024-09-27 | End: 2024-09-27

## 2024-09-27 RX ADMIN — Medication 1 APPLICATION(S): at 13:03

## 2024-09-27 RX ADMIN — SODIUM CHLORIDE 1000 MILLILITER(S): 9 INJECTION INTRAMUSCULAR; INTRAVENOUS; SUBCUTANEOUS at 12:50

## 2024-09-27 NOTE — ED PROVIDER NOTE - WORK/EXCUSE FORM DATE
Bedside and Verbal shift change report given to Radha Roach (oncoming nurse) by Oneida WARREN (offgoing nurse). Report included the following information SBAR and ED Summary. 27-Sep-2024

## 2024-09-27 NOTE — ED ADULT NURSE NOTE - BEFAST EYES
"Subjective:       Patient ID: Jose Eduardo Cordova is a 21 y.o. male.    Vitals:  height is 5' 7.44" (1.713 m) and weight is 59 kg (130 lb). His oral temperature is 98.3 °F (36.8 °C). His blood pressure is 105/68 and his pulse is 70. His respiration is 16 and oxygen saturation is 98%.     Chief Complaint: Sinus Problem    21 Year old male presents to the urgent care with his mother for c/o sore throat, nasal congestion, cough, muscle aches, nausea, and chills. Patient states that he started having symptoms on Monday from [facility]. He denies any sick contact or recent travel. He rates his pain a 7/10. He has tried tylenol and an at home remedy for his cough that has brought him relief. He states that nothing aggravates his current symptoms. Patient denies CP, SOB, fever, diarrhea, constipation, and headache.     Sinus Problem  This is a new problem. The current episode started in the past 7 days (10/31/22). The problem has been gradually improving since onset. The maximum temperature recorded prior to his arrival was 100.4 - 100.9 F (100.4 F on 10/31/22). His pain is at a severity of 7/10. The pain is moderate. Associated symptoms include chills, coughing (non-productive) and a sore throat. Pertinent negatives include no congestion, diaphoresis, ear pain, headaches, hoarse voice, neck pain, shortness of breath, sinus pressure, sneezing or swollen glands. (weakness) Past treatments include acetaminophen (tylenol). The treatment provided moderate relief.     Constitution: Positive for chills. Negative for sweating, fatigue and fever.   HENT:  Positive for sore throat. Negative for ear pain, ear discharge, dental problem, congestion, nosebleeds, postnasal drip, sinus pain, sinus pressure, trouble swallowing and voice change.    Neck: Negative for neck pain and neck stiffness.   Cardiovascular:  Negative for chest pain, palpitations and sob on exertion.   Eyes:  Negative for eye itching, eye pain and eye redness. "   Respiratory:  Positive for cough (non-productive). Negative for shortness of breath, stridor, wheezing and asthma.    Gastrointestinal:  Positive for nausea. Negative for abdominal pain, vomiting, constipation and diarrhea.   Musculoskeletal:  Positive for muscle ache. Negative for muscle cramps.   Skin:  Negative for rash.   Allergic/Immunologic: Negative for asthma and sneezing.   Neurological:  Negative for light-headedness, headaches and altered mental status.   Psychiatric/Behavioral:  Negative for altered mental status.      Objective:      Physical Exam   Constitutional: He is oriented to person, place, and time. He appears well-developed. He is cooperative.  Non-toxic appearance. He does not appear ill. No distress.   HENT:   Head: Normocephalic and atraumatic.   Ears:   Right Ear: Hearing, tympanic membrane, external ear and ear canal normal.   Left Ear: Hearing, tympanic membrane, external ear and ear canal normal.   Nose: Rhinorrhea and congestion present. No mucosal edema, purulent discharge or nasal deformity. No epistaxis. Right sinus exhibits no maxillary sinus tenderness and no frontal sinus tenderness. Left sinus exhibits no maxillary sinus tenderness and no frontal sinus tenderness.   Mouth/Throat: Uvula is midline and mucous membranes are normal. Mucous membranes are moist. No trismus in the jaw. Normal dentition. No uvula swelling. Posterior oropharyngeal erythema present. No oropharyngeal exudate, posterior oropharyngeal edema or tonsillar abscesses.   Eyes: Conjunctivae and lids are normal. Pupils are equal, round, and reactive to light. No scleral icterus. Extraocular movement intact   Neck: Trachea normal and phonation normal. Neck supple. No edema present. No erythema present. No neck rigidity present.   Cardiovascular: Normal rate, regular rhythm, S1 normal, S2 normal, normal heart sounds and normal pulses.   No murmur heard.Exam reveals no gallop and no friction rub.   Pulmonary/Chest:  Effort normal and breath sounds normal. No stridor. No respiratory distress. He has no decreased breath sounds. He has no wheezes. He has no rhonchi.   Abdominal: Normal appearance and bowel sounds are normal. He exhibits no distension. Soft. flat abdomen There is no abdominal tenderness. There is no guarding.   Musculoskeletal: Normal range of motion.         General: No deformity. Normal range of motion.      Cervical back: He exhibits no tenderness.   Lymphadenopathy:     He has cervical adenopathy.   Neurological: no focal deficit. He is alert, oriented to person, place, and time and at baseline. He exhibits normal muscle tone. Coordination normal.   Skin: Skin is warm, dry, intact, not diaphoretic and not pale.   Psychiatric: His speech is normal and behavior is normal. Judgment and thought content normal.   Nursing note and vitals reviewed.      Results for orders placed or performed in visit on 11/02/22   POCT Influenza A/B MOLECULAR   Result Value Ref Range    POC Molecular Influenza A Ag Negative Negative, Not Reported    POC Molecular Influenza B Ag Negative Negative, Not Reported     Acceptable Yes        Assessment:       1. Upper respiratory tract infection, unspecified type    2. Acute cough    3. Sore throat          Plan:     Previous external notes reviewed.  Vital signs reviewed.  Labs ordered. Labs reviewed.    Patient Instructions   Please drink plenty of fluids.  Please get plenty of rest.  Please return here or go to the Emergency Department for any concerns or worsening of condition.  If you do not have Hypertension or any history of palpitations, it is ok to take over the counter Sudafed or Mucinex D or Allegra-D or Claritin-D or Zyrtec-D.  If you do take one of the above, it is ok to combine that with plain over the counter Mucinex or Allegra or Claritin or Zyrtec.  If for example you are taking Zyrtec -D, you can combine that with Mucinex, but not Mucinex-D.  If you are  taking Mucinex-D, you can combine that with plain Allegra or Claritin or Zyrtec.   If you do have Hypertension or palpitations, it is safe to take Coricidin HBP for relief of sinus symptoms.  We recommend you take over the counter Flonase (Fluticasone) or another nasally inhaled steroid unless you are already taking one.  Nasal irrigation with a saline spray or Netti Pot like device per their directions is also recommended.  If not allergic, please take over the counter Tylenol (Acetaminophen) and/or Motrin (Ibuprofen) as directed for control of pain and/or fever.  Please follow up with your primary care doctor or specialist as needed.    If you  smoke, please stop smoking.      Upper respiratory tract infection, unspecified type  -     Discontinue: fluticasone propionate (FLONASE) 50 mcg/actuation nasal spray; 1 spray (50 mcg total) by Each Nostril route once daily.  Dispense: 16 g; Refill: 0  -     fluticasone propionate (FLONASE) 50 mcg/actuation nasal spray; 1 spray (50 mcg total) by Each Nostril route once daily.  Dispense: 16 g; Refill: 0    Acute cough  -     POCT Influenza A/B MOLECULAR    Sore throat  -     Discontinue: magic mouthwash diphen/antac/lidoc; Swish and spit 10 mLs every 4 (four) hours as needed (Sore Throat).  Dispense: 180 mL; Refill: 0  -     (Magic mouthwash) 1:1:1 diphenhydrAMINE(Benadryl) 12.5mg/5ml liq, aluminum & magnesium hydroxide-simethicone (Maalox), LIDOcaine viscous 2%; Swish and spit 10 mLs every 4 (four) hours as needed (Sore Throat).  Dispense: 180 mL; Refill: 0       Jose Yost PA-C                 Yes

## 2024-09-27 NOTE — ED PROVIDER NOTE - PATIENT PORTAL LINK FT
You can access the FollowMyHealth Patient Portal offered by [facility] by registering at the following website: [email or web address]. By joining [employer]’s FollowMyHealth portal, you will also be able to view your health information using other applications (apps) compatible with our system.

## 2024-09-27 NOTE — ED PROVIDER NOTE - PROGRESS NOTE DETAILS
BF: Pt heading to ophtho clinic for further eval and management. Unable to get meds from pharmacy quickly enough, but I had patient take her home dose of latanoprost and timolol. Head of bed elevated, lights on. BF: Pt returned from ophtho clinic. Recommending additional meds for discharge which I prescribed. Cleared from their perspective.     On re-exam, pt's pain and nausea have resolved. Tolerating PO. Agreeable for dc.     labs  reviewed with pt. given good instructions when to return to ED and importance of f/u.  all incidental findings were discussed with pt as well. pt verbalized understanding. patient was given opportunity to ask questions.

## 2024-09-27 NOTE — ED PROVIDER NOTE - PHYSICAL EXAMINATION
CONSTITUTIONAL: Well-appearing; well-nourished; in no apparent distress.   HEAD: Normocephalic; atraumatic.   EYES:  EOM intact. L cornea is steamy, sclera injected  ENT: Normal pharynx with no tonsillar hypertrophy.  NECK: Supple; non-tender  CARDIOVASCULAR: Normal S1, S2; no murmurs, rubs, or gallops.   RESPIRATORY: Normal chest excursion with respiration; breath sounds clear and equal bilaterally; no wheezes, rhonchi, or rales.  GI/: soft, non-distended; non-tender.  BACK: No evidence of trauma or deformity. Non-tender to palpation. No CVA tenderness.   EXT: contracted RUE; non-tender to palpation; distal pulses are normal. R>L LE edema which is baseline.   SKIN: Normal for age and race; warm; dry  NEURO: A & O x 4;  R facial droop, R hemiparesis at baseline

## 2024-09-27 NOTE — ED PROVIDER NOTE - OBJECTIVE STATEMENT
85 y/o F h/o HLD, CVA with residual R sided deficits (facial droop, R hemiparesis), cerebral aneurysm s/p clipping, p/w HA, N/V, L eye pain since this morning. Didn't take her glaucoma meds this morning because of her vomiting. HA is localized over the L eye, nonradiating, no exacerbating/alleviating factors, no new neuro complaints.

## 2024-09-27 NOTE — ED PROVIDER NOTE - NSFOLLOWUPINSTRUCTIONS_ED_ALL_ED_FT
Take your new medications as prescribed. Please follow up with your ophtho doctor this week. Return for worsening headache, vomiting, fevers, or any other concerns

## 2024-09-27 NOTE — ED PROVIDER NOTE - CLINICAL SUMMARY MEDICAL DECISION MAKING FREE TEXT BOX
83 y/o F h/o HLD, CVA with residual R sided deficits (facial droop, R hemiparesis), cerebral aneurysm s/p clipping, p/w HA, N/V, L eye pain since this morning. Didn't take her glaucoma meds this morning because of her vomiting. HA is localized over the L eye, nonradiating, no exacerbating/alleviating factors, no new neuro complaints.     Cornea is steamy, sclera injected. Eye pressure highest reading 57 mmHg in the L eye. VA to light only. EOMI.     Pt ordered timolol, acetazolamide, brimonidine, ophtho consulted, pt going to ophtho clinic now. HoB elevated, lights on.    Attending Statement:  I have personally seen the patient.  I provided critical care for  a total of 35 minutes for vision-threatening condition. I provided a substantive portion of the care and the majority of the critical care time.

## 2024-09-27 NOTE — CONSULT NOTE ADULT - ASSESSMENT
ASSESSMENT  1) Secondary Angle Closure OS  -- ddx: NVG vs aqueous misdirection    RECOMMENDATIONS  -- Continue Timolol 0.5% BID OS  -- Continue Latanoprost 0.005% qHS OS  -- Start Cyclogyl 1% BID OS   -- Start Prednisolone Acetate 1% BID OS  -- Start Brimonidine 0.2% TID  -- Pt should follow up with her own ophthalmologist (Dr. Diaz) within the next week

## 2024-09-27 NOTE — CONSULT NOTE ADULT - SUBJECTIVE AND OBJECTIVE BOX
ALLERGIES: No Known Allergies      HEALTH ISSUES------------------------------------------------------  No pertinent family history in first degree relatives    MEWS Score    Hyperlipidemia    VOMITING    90+    SysAdmin_VisitLink          PRESCRIPTIONS-----------------------------------------------------  Aspirin Low Strength 81 mg oral delayed release tablet: 1 tab(s) orally once a day           VISIT-------------------------------------------------------------------        T(C): 37.3 (09-27-24 @ 11:26), Max: 37.3 (09-27-24 @ 11:26)  HR: 99 (09-27-24 @ 11:26) (99 - 99)  BP: 135/73 (09-27-24 @ 11:26) (135/73 - 135/73)  RR: 16 (09-27-24 @ 11:26) (16 - 16)  SpO2: 95% (09-27-24 @ 11:26) (95% - 95%)        EYE EXAM-----------------------------------------------------------    Chief Complaint: 83 y/o F h/o HLD, 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 Began vomiting last night ~9 pm. Daughter was called to take pt to the ER. Didn't take her glaucoma meds this morning because of her vomiting, instilled ~12:45 pm instead. Pain is localized over the L eye, nonradiating, no exacerbating/alleviating factors, no new neuro complaints.    MIRIAN within 6 months with Dr. Diaz    OHx  (+)CE OU Dr. ADAMS  (+)glaucoma surgery OS ~12-14 yrs ago  (+)LPI OS?    Entrance Testing  VAsc: OD 20/40-2           OS NLP  Pupils: round and reactive OD, dilated OS  EOMs: full OD, OS      IOP taken via [facility] @~2pm  OD 12 mmHg  OS 50 mmHg    Anterior Segment  Adnexa/Lids: wnl OU  Conjunctiva: white and quiet OD, injection OS  Cornea: cl OD, bullous, hazy OS with peripheral neovascularization  AC: deep and quiet OD, very shallow OS  Iris: flat and round OD, hazy OS    Dilation deferred.     unable to get view OS 2/2 media    started Diamox  mg ~3:15 pm  3 rounds of Alphagan, Timolol    IOP taken via [facility] @3:21 pm  OS: ~36 mmHg c irregular mires     ALLERGIES: No Known Allergies      HEALTH ISSUES------------------------------------------------------  No pertinent family history in first degree relatives    MEWS Score    Hyperlipidemia    VOMITING    90+    SysAdmin_VisitLink          PRESCRIPTIONS-----------------------------------------------------  Aspirin Low Strength 81 mg oral delayed release tablet: 1 tab(s) orally once a day           VISIT-------------------------------------------------------------------        T(C): 37.3 (09-27-24 @ 11:26), Max: 37.3 (09-27-24 @ 11:26)  HR: 99 (09-27-24 @ 11:26) (99 - 99)  BP: 135/73 (09-27-24 @ 11:26) (135/73 - 135/73)  RR: 16 (09-27-24 @ 11:26) (16 - 16)  SpO2: 95% (09-27-24 @ 11:26) (95% - 95%)        EYE EXAM-----------------------------------------------------------    Chief Complaint: 85 y/o F h/o HLD, 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 Began vomiting last night ~9 pm. Daughter was called to take pt to the ER. Didn't take her glaucoma meds this morning because of her vomiting, instilled ~12:45 pm instead. Pain is localized over the L eye, nonradiating, no exacerbating/alleviating factors, no new neuro complaints.    MIRIAN within 6 months with Dr. Diaz    OHx  (+)CE OU Dr. ADAMS  (+)glaucoma surgery OS ~12-14 yrs ago  (+)LPI OS?    Entrance Testing  VAsc: OD 20/40-2           OS NLP  Pupils: round and reactive OD, dilated OS  EOMs: full OD, OS      IOP taken via [facility] @~2pm  OD 12 mmHg  OS 50 mmHg    started Diamox  mg ~3:15 pm  3 rounds of Alphagan, Timolol    IOP taken via [facility] @3:21 pm  OS: ~36 mmHg c irregular mires    Anterior Segment  Adnexa/Lids: wnl OU  Conjunctiva: white and quiet OD, injection OS  Cornea: cl OD, bullous, hazy OS with peripheral neovascularization  AC: deep and quiet OD, very shallow centrally and peripherally OS  Iris: flat and round OD, (+)NVI inf OS, entire lens/iris diaphragm shifted anteriorly     Dilation deferred.     unable to get view OS 2/2 media    B-scan:  OS: (+)vitreous syneresis; (-)RD/tumor      pt examined alongside Dr. Avila Kennedy MD

## 2024-10-03 ENCOUNTER — OUTPATIENT (OUTPATIENT)
Dept: OUTPATIENT SERVICES | Facility: HOSPITAL | Age: 84
LOS: 1 days | End: 2024-10-03
Payer: MEDICARE

## 2024-10-03 DIAGNOSIS — Z00.8 ENCOUNTER FOR OTHER GENERAL EXAMINATION: ICD-10-CM

## 2024-10-03 DIAGNOSIS — E04.2 NONTOXIC MULTINODULAR GOITER: ICD-10-CM

## 2024-10-03 PROCEDURE — 76536 US EXAM OF HEAD AND NECK: CPT

## 2024-10-03 PROCEDURE — 76536 US EXAM OF HEAD AND NECK: CPT | Mod: 26

## 2024-10-04 DIAGNOSIS — E04.2 NONTOXIC MULTINODULAR GOITER: ICD-10-CM

## 2025-03-21 ENCOUNTER — APPOINTMENT (OUTPATIENT)
Dept: CARDIOLOGY | Facility: CLINIC | Age: 85
End: 2025-03-21
Payer: MEDICARE

## 2025-03-21 ENCOUNTER — NON-APPOINTMENT (OUTPATIENT)
Age: 85
End: 2025-03-21

## 2025-03-21 VITALS
BODY MASS INDEX: 29.23 KG/M2 | OXYGEN SATURATION: 99 % | DIASTOLIC BLOOD PRESSURE: 70 MMHG | WEIGHT: 165 LBS | SYSTOLIC BLOOD PRESSURE: 142 MMHG | HEART RATE: 89 BPM | HEIGHT: 63 IN

## 2025-03-21 DIAGNOSIS — Z86.73 PERSONAL HISTORY OF TRANSIENT ISCHEMIC ATTACK (TIA), AND CEREBRAL INFARCTION W/OUT RESIDUAL DEFICITS: ICD-10-CM

## 2025-03-21 DIAGNOSIS — I48.0 PAROXYSMAL ATRIAL FIBRILLATION: ICD-10-CM

## 2025-03-21 DIAGNOSIS — E78.5 HYPERLIPIDEMIA, UNSPECIFIED: ICD-10-CM

## 2025-03-21 PROCEDURE — 93000 ELECTROCARDIOGRAM COMPLETE: CPT

## 2025-03-21 PROCEDURE — 99214 OFFICE O/P EST MOD 30 MIN: CPT | Mod: 25

## 2025-05-22 ENCOUNTER — NON-APPOINTMENT (OUTPATIENT)
Age: 85
End: 2025-05-22

## 2025-05-23 ENCOUNTER — APPOINTMENT (OUTPATIENT)
Dept: NEUROLOGY | Facility: CLINIC | Age: 85
End: 2025-05-23
Payer: MEDICARE

## 2025-05-23 VITALS
OXYGEN SATURATION: 99 % | BODY MASS INDEX: 29.23 KG/M2 | SYSTOLIC BLOOD PRESSURE: 143 MMHG | WEIGHT: 165 LBS | DIASTOLIC BLOOD PRESSURE: 84 MMHG | HEIGHT: 63 IN | HEART RATE: 108 BPM

## 2025-05-23 DIAGNOSIS — Z86.73 PERSONAL HISTORY OF TRANSIENT ISCHEMIC ATTACK (TIA), AND CEREBRAL INFARCTION W/OUT RESIDUAL DEFICITS: ICD-10-CM

## 2025-05-23 DIAGNOSIS — I67.1 CEREBRAL ANEURYSM, NONRUPTURED: ICD-10-CM

## 2025-05-23 PROCEDURE — G2211 COMPLEX E/M VISIT ADD ON: CPT

## 2025-05-23 PROCEDURE — 99215 OFFICE O/P EST HI 40 MIN: CPT

## 2025-06-13 ENCOUNTER — EMERGENCY (EMERGENCY)
Facility: HOSPITAL | Age: 85
LOS: 0 days | Discharge: ANOTHER HOSPITAL | End: 2025-06-14
Attending: STUDENT IN AN ORGANIZED HEALTH CARE EDUCATION/TRAINING PROGRAM | Admitting: INTERNAL MEDICINE
Payer: MEDICARE

## 2025-06-13 VITALS
HEART RATE: 88 BPM | DIASTOLIC BLOOD PRESSURE: 81 MMHG | TEMPERATURE: 99 F | RESPIRATION RATE: 18 BRPM | OXYGEN SATURATION: 100 % | WEIGHT: 179.9 LBS | SYSTOLIC BLOOD PRESSURE: 155 MMHG | HEIGHT: 64 IN

## 2025-06-13 DIAGNOSIS — Z79.01 LONG TERM (CURRENT) USE OF ANTICOAGULANTS: ICD-10-CM
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
## 2025-06-13 DIAGNOSIS — I69.398 OTHER SEQUELAE OF CEREBRAL INFARCTION: ICD-10-CM
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
## 2025-06-13 DIAGNOSIS — E78.5 HYPERLIPIDEMIA, UNSPECIFIED: ICD-10-CM

## 2025-06-13 DIAGNOSIS — S72.002A FRACTURE OF UNSPECIFIED PART OF NECK OF LEFT FEMUR, INITIAL ENCOUNTER FOR CLOSED FRACTURE: ICD-10-CM

## 2025-06-13 DIAGNOSIS — S72.22XA DISPLACED SUBTROCHANTERIC FRACTURE OF LEFT FEMUR, INITIAL ENCOUNTER FOR CLOSED FRACTURE: ICD-10-CM

## 2025-06-13 DIAGNOSIS — M25.552 PAIN IN LEFT HIP: ICD-10-CM
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
## 2025-06-13 DIAGNOSIS — I48.91 UNSPECIFIED ATRIAL FIBRILLATION: ICD-10-CM

## 2025-06-13 LAB
ALBUMIN SERPL ELPH-MCNC: 4.4 G/DL — SIGNIFICANT CHANGE UP (ref 3.5–5.2)
ALP SERPL-CCNC: 86 U/L — SIGNIFICANT CHANGE UP (ref 30–115)
ALT FLD-CCNC: 12 U/L — SIGNIFICANT CHANGE UP (ref 0–41)
ANION GAP SERPL CALC-SCNC: 10 MMOL/L — SIGNIFICANT CHANGE UP (ref 7–14)
APTT BLD: 32.9 SEC — SIGNIFICANT CHANGE UP (ref 27–39.2)
AST SERPL-CCNC: 18 U/L — SIGNIFICANT CHANGE UP (ref 0–41)
BASOPHILS # BLD AUTO: 0.03 K/UL — SIGNIFICANT CHANGE UP (ref 0–0.2)
BASOPHILS NFR BLD AUTO: 0.3 % — SIGNIFICANT CHANGE UP (ref 0–1)
BILIRUB SERPL-MCNC: 0.5 MG/DL — SIGNIFICANT CHANGE UP (ref 0.2–1.2)
BUN SERPL-MCNC: 14 MG/DL — SIGNIFICANT CHANGE UP (ref 10–20)
CALCIUM SERPL-MCNC: 9.1 MG/DL — SIGNIFICANT CHANGE UP (ref 8.4–10.5)
CHLORIDE SERPL-SCNC: 103 MMOL/L — SIGNIFICANT CHANGE UP (ref 98–110)
CO2 SERPL-SCNC: 26 MMOL/L — SIGNIFICANT CHANGE UP (ref 17–32)
CREAT SERPL-MCNC: 0.6 MG/DL — LOW (ref 0.7–1.5)
EGFR: 90 ML/MIN/1.73M2 — SIGNIFICANT CHANGE UP
EGFR: 90 ML/MIN/1.73M2 — SIGNIFICANT CHANGE UP
EOSINOPHIL # BLD AUTO: 0.16 K/UL — SIGNIFICANT CHANGE UP (ref 0–0.7)
EOSINOPHIL NFR BLD AUTO: 1.9 % — SIGNIFICANT CHANGE UP (ref 0–8)
GLUCOSE SERPL-MCNC: 119 MG/DL — HIGH (ref 70–99)
HCT VFR BLD CALC: 40.9 % — SIGNIFICANT CHANGE UP (ref 37–47)
HGB BLD-MCNC: 13.4 G/DL — SIGNIFICANT CHANGE UP (ref 12–16)
IMM GRANULOCYTES NFR BLD AUTO: 0.5 % — HIGH (ref 0.1–0.3)
INR BLD: 1.28 RATIO — SIGNIFICANT CHANGE UP (ref 0.65–1.3)
LYMPHOCYTES # BLD AUTO: 2.28 K/UL — SIGNIFICANT CHANGE UP (ref 1.2–3.4)
LYMPHOCYTES # BLD AUTO: 26.6 % — SIGNIFICANT CHANGE UP (ref 20.5–51.1)
MCHC RBC-ENTMCNC: 28.9 PG — SIGNIFICANT CHANGE UP (ref 27–31)
MCHC RBC-ENTMCNC: 32.8 G/DL — SIGNIFICANT CHANGE UP (ref 32–37)
MCV RBC AUTO: 88.1 FL — SIGNIFICANT CHANGE UP (ref 81–99)
MONOCYTES # BLD AUTO: 0.55 K/UL — SIGNIFICANT CHANGE UP (ref 0.1–0.6)
MONOCYTES NFR BLD AUTO: 6.4 % — SIGNIFICANT CHANGE UP (ref 1.7–9.3)
NEUTROPHILS # BLD AUTO: 5.52 K/UL — SIGNIFICANT CHANGE UP (ref 1.4–6.5)
NEUTROPHILS NFR BLD AUTO: 64.3 % — SIGNIFICANT CHANGE UP (ref 42.2–75.2)
NRBC BLD AUTO-RTO: 0 /100 WBCS — SIGNIFICANT CHANGE UP (ref 0–0)
PLATELET # BLD AUTO: 317 K/UL — SIGNIFICANT CHANGE UP (ref 130–400)
PMV BLD: 10.5 FL — HIGH (ref 7.4–10.4)
POTASSIUM SERPL-MCNC: 4.2 MMOL/L — SIGNIFICANT CHANGE UP (ref 3.5–5)
POTASSIUM SERPL-SCNC: 4.2 MMOL/L — SIGNIFICANT CHANGE UP (ref 3.5–5)
PROT SERPL-MCNC: 7.9 G/DL — SIGNIFICANT CHANGE UP (ref 6–8)
PROTHROM AB SERPL-ACNC: 15.2 SEC — HIGH (ref 9.95–12.87)
RBC # BLD: 4.64 M/UL — SIGNIFICANT CHANGE UP (ref 4.2–5.4)
RBC # FLD: 12.8 % — SIGNIFICANT CHANGE UP (ref 11.5–14.5)
SARS-COV-2 RNA SPEC QL NAA+PROBE: SIGNIFICANT CHANGE UP
SODIUM SERPL-SCNC: 139 MMOL/L — SIGNIFICANT CHANGE UP (ref 135–146)
WBC # BLD: 8.58 K/UL — SIGNIFICANT CHANGE UP (ref 4.8–10.8)
WBC # FLD AUTO: 8.58 K/UL — SIGNIFICANT CHANGE UP (ref 4.8–10.8)

## 2025-06-13 PROCEDURE — 99285 EMERGENCY DEPT VISIT HI MDM: CPT | Mod: 25

## 2025-06-13 PROCEDURE — 85025 COMPLETE CBC W/AUTO DIFF WBC: CPT

## 2025-06-13 PROCEDURE — 70450 CT HEAD/BRAIN W/O DYE: CPT

## 2025-06-13 PROCEDURE — 72192 CT PELVIS W/O DYE: CPT | Mod: 26

## 2025-06-13 PROCEDURE — 73552 X-RAY EXAM OF FEMUR 2/>: CPT | Mod: 26,50

## 2025-06-13 PROCEDURE — 87635 SARS-COV-2 COVID-19 AMP PRB: CPT

## 2025-06-13 PROCEDURE — 86850 RBC ANTIBODY SCREEN: CPT

## 2025-06-13 PROCEDURE — 71045 X-RAY EXAM CHEST 1 VIEW: CPT

## 2025-06-13 PROCEDURE — 36415 COLL VENOUS BLD VENIPUNCTURE: CPT

## 2025-06-13 PROCEDURE — 85730 THROMBOPLASTIN TIME PARTIAL: CPT

## 2025-06-13 PROCEDURE — 96374 THER/PROPH/DIAG INJ IV PUSH: CPT

## 2025-06-13 PROCEDURE — 96375 TX/PRO/DX INJ NEW DRUG ADDON: CPT

## 2025-06-13 PROCEDURE — 96376 TX/PRO/DX INJ SAME DRUG ADON: CPT

## 2025-06-13 PROCEDURE — 80053 COMPREHEN METABOLIC PANEL: CPT

## 2025-06-13 PROCEDURE — 71045 X-RAY EXAM CHEST 1 VIEW: CPT | Mod: 26

## 2025-06-13 PROCEDURE — 85610 PROTHROMBIN TIME: CPT

## 2025-06-13 PROCEDURE — 99285 EMERGENCY DEPT VISIT HI MDM: CPT

## 2025-06-13 PROCEDURE — 86901 BLOOD TYPING SEROLOGIC RH(D): CPT

## 2025-06-13 PROCEDURE — 72170 X-RAY EXAM OF PELVIS: CPT | Mod: 26

## 2025-06-13 PROCEDURE — 73564 X-RAY EXAM KNEE 4 OR MORE: CPT | Mod: 26,50

## 2025-06-13 PROCEDURE — 86900 BLOOD TYPING SEROLOGIC ABO: CPT

## 2025-06-13 PROCEDURE — 72170 X-RAY EXAM OF PELVIS: CPT

## 2025-06-13 PROCEDURE — 73552 X-RAY EXAM OF FEMUR 2/>: CPT | Mod: 50

## 2025-06-13 PROCEDURE — 73564 X-RAY EXAM KNEE 4 OR MORE: CPT | Mod: 50

## 2025-06-13 PROCEDURE — 70450 CT HEAD/BRAIN W/O DYE: CPT | Mod: 26

## 2025-06-13 PROCEDURE — 72192 CT PELVIS W/O DYE: CPT

## 2025-06-13 RX ADMIN — Medication 2 MILLIGRAM(S): at 22:22

## 2025-06-14 VITALS
RESPIRATION RATE: 18 BRPM | DIASTOLIC BLOOD PRESSURE: 66 MMHG | OXYGEN SATURATION: 100 % | SYSTOLIC BLOOD PRESSURE: 117 MMHG | HEART RATE: 100 BPM | TEMPERATURE: 100 F

## 2025-06-14 RX ORDER — ONDANSETRON HCL/PF 4 MG/2 ML
4 VIAL (ML) INJECTION ONCE
Refills: 0 | Status: COMPLETED | OUTPATIENT
Start: 2025-06-14 | End: 2025-06-14

## 2025-06-14 RX ADMIN — Medication 4 MILLIGRAM(S): at 01:27

## 2025-06-14 RX ADMIN — Medication 2 MILLIGRAM(S): at 03:00

## 2025-06-14 RX ADMIN — Medication 2 MILLIGRAM(S): at 01:32

## 2025-07-07 NOTE — ED CDU PROVIDER SUBSEQUENT DAY NOTE - NS ED ATTENDING STATEMENT MOD
"Elissa Marrero (89 y.o. Female)       Date of Birth   03/15/1923    Social Security Number       Address   [address] [place] [address]    Home Phone   [phone]    MRN   [id number]       Taoist   Other    Marital Status                               Admission Date   7/3/2025    Admission Type   Emergency    Admitting Provider   Kinga Amor DO    Attending Provider   Armond Kim MD    Department, Room/Bed   [address], P576/1       Discharge Date       Discharge Disposition       Discharge Destination                                 Attending Provider: Armond Kim MD    Allergies: Benazepril    Isolation: None   Infection: None   Code Status: No CPR    Ht: 170.2 cm (67\")   Wt: 62 kg (136 lb 11 oz)    Admission Cmt: None   Principal Problem: Subdural hematoma [S06.5XAA]                   Active Insurance as of 7/3/2025       Primary Coverage       Payor Plan Insurance Group Employer/Plan Group    MEDICARE MEDICARE A & B        Payor Plan Address Payor Plan Phone Number Payor Plan Fax Number Effective Dates    [address] [phone]  3/1/1990 - None Entered    [facility] [id number]         Subscriber Name Subscriber Birth Date Member ID       ELISSA MARRERO 3/15/1923 [id number]               Secondary Coverage       Payor Plan Insurance Group Employer/Plan Group    [facility] SUPP [id number]       Payor Plan Address Payor Plan Phone Number Payor Plan Fax Number Effective Dates    [address]   7/1/2003 - None Entered    [facility] [id number]         Subscriber Name Subscriber Birth Date Member ID       ELISSA MARRERO 3/15/1923 [id number]                     Emergency Contacts        (Rel.) Home Phone Work Phone Mobile Phone    Michelle Spain (Daughter) [phone] -- [phone]    MioMeredith (Daughter) [phone] -- [phone]            " This was a shared visit with the HUNTER. I reviewed and verified the documentation and independently performed the documented: